# Patient Record
Sex: MALE | Race: BLACK OR AFRICAN AMERICAN | Employment: FULL TIME | URBAN - METROPOLITAN AREA
[De-identification: names, ages, dates, MRNs, and addresses within clinical notes are randomized per-mention and may not be internally consistent; named-entity substitution may affect disease eponyms.]

---

## 2017-01-16 ENCOUNTER — OFFICE VISIT (OUTPATIENT)
Dept: FAMILY MEDICINE CLINIC | Age: 52
End: 2017-01-16

## 2017-01-16 VITALS
TEMPERATURE: 97.9 F | SYSTOLIC BLOOD PRESSURE: 144 MMHG | DIASTOLIC BLOOD PRESSURE: 93 MMHG | HEART RATE: 81 BPM | WEIGHT: 283.6 LBS | HEIGHT: 62 IN | OXYGEN SATURATION: 94 % | BODY MASS INDEX: 52.19 KG/M2 | RESPIRATION RATE: 20 BRPM

## 2017-01-16 DIAGNOSIS — R73.9 HYPERGLYCEMIA: ICD-10-CM

## 2017-01-16 DIAGNOSIS — M70.22 OLECRANON BURSITIS, LEFT ELBOW: ICD-10-CM

## 2017-01-16 DIAGNOSIS — M17.11 PRIMARY OSTEOARTHRITIS OF RIGHT KNEE: Primary | ICD-10-CM

## 2017-01-16 DIAGNOSIS — G43.909 MIGRAINE SYNDROME: ICD-10-CM

## 2017-01-16 LAB
GLUCOSE POC: 85 MG/DL
HBA1C MFR BLD HPLC: 6.5 %

## 2017-01-16 RX ORDER — METHYLPREDNISOLONE ACETATE 40 MG/ML
40 INJECTION, SUSPENSION INTRA-ARTICULAR; INTRALESIONAL; INTRAMUSCULAR; SOFT TISSUE ONCE
Qty: 1 ML | Refills: 0
Start: 2017-01-16 | End: 2017-01-16

## 2017-01-16 RX ORDER — TRAMADOL HYDROCHLORIDE 50 MG/1
50 TABLET ORAL
Qty: 40 TAB | Refills: 1 | Status: SHIPPED | OUTPATIENT
Start: 2017-01-16 | End: 2017-03-17 | Stop reason: SDUPTHER

## 2017-01-16 RX ORDER — DICLOFENAC SODIUM 75 MG/1
75 TABLET, DELAYED RELEASE ORAL 2 TIMES DAILY WITH MEALS
Qty: 30 TAB | Refills: 3 | Status: SHIPPED | OUTPATIENT
Start: 2017-01-16 | End: 2018-01-02 | Stop reason: SDUPTHER

## 2017-01-16 NOTE — PROGRESS NOTES
Chief Complaint   Patient presents with    Knee Pain     right knee pain x 2 weeks     DEPARTMENT [facility]  OFFICE PROCEDURE PROGRESS NOTE        Chart reviewed for the following:   Jodi Morales LPN, have reviewed the History, Physical and updated the Allergic reactions for [place] performed immediately prior to start of procedure:   Jodi Morales LPN, have performed the following reviews on [facility] prior to the start of the procedure:            * Patient was identified by name and date of birth   * Agreement on procedure being performed was verified  * Risks and Benefits explained to the patient  * Procedure site verified and marked as necessary  * Patient was positioned for comfort  * Consent was signed and verified     Time: 5:20pm      Date of procedure: 1/16/2017    Procedure performed by:   Altagracia Buck MD    Provider assisted by: none    Patient assisted by: none    How tolerated by patient: well    Post Procedural Pain Scale: 3    Comments: patient is here for a right knee joint injection, rates pain 7/10 prior to procedure

## 2017-01-16 NOTE — MR AVS SNAPSHOT
Visit Information Date & Time Provider Department Dept. Phone Encounter #  
 1/16/2017  4:30 PM Silas Pang, [address] [facility] [phone] [id number] Your Appointments 2/9/2017  9:00 AM  
Any with Rj Mercedes MD  
[address] [place] ([facility]) Appt Note: 1st adherence visit- bring machine [address] [facility] [address] [address] [facility] [address] [address] Upcoming Health Maintenance Date Due FOBT Q 1 YEAR AGE 50-75 9/20/2014 DTaP/Tdap/Td series (2 - Td) 7/30/2026 Allergies as of 1/16/2017  Review Complete On: 1/16/2017 By: Silas Pang MD  
 No Known Allergies Current Immunizations  Never Reviewed Name Date Tdap 7/30/2016 12:33 AM  
  
 Not reviewed this visit You Were Diagnosed With   
  
 Codes Comments Primary osteoarthritis of right knee    -  Primary ICD-10-CM: M17.11 ICD-9-CM: 715.16 Migraine syndrome     ICD-10-CM: J73.224 ICD-9-CM: 346.00 Olecranon bursitis, left elbow     ICD-10-CM: M70.22 ICD-9-CM: 726.33 Hyperglycemia     ICD-10-CM: R73.9 ICD-9-CM: 790.29 Vitals BP Pulse Temp Resp Height(growth percentile) Weight(growth percentile) (!) 144/93 81 97.9 °F (36.6 °C) (Oral) 20 5' 2\" (1.575 m) 283 lb 9.6 oz (128.6 kg) SpO2 BMI Smoking Status 94% 51.87 kg/m2 Never Smoker Vitals History BMI and BSA Data Body Mass Index Body Surface Area  
 51.87 kg/m 2 2.37 m 2 Preferred Pharmacy Pharmacy Name Phone [id number] - [address] [phone] Your Updated Medication List  
  
   
This list is accurate as of: 1/16/17  5:54 PM.  Always use your most recent med list.  
  
  
  
  
 albuterol 90 mcg/actuation inhaler Commonly known as:  PROVENTIL HFA, VENTOLIN HFA, PROAIR HFA Take 2 Puffs by inhalation every four (4) hours as needed for Wheezing. chlorpheniramine-HYDROcodone 10-8 mg/5 mL suspension Commonly known as:  Earle Lapidus ER Take 5 mL by mouth every twelve (12) hours as needed for Cough. Max Daily Amount: 10 mL. diclofenac EC 75 mg EC tablet Commonly known as:  VOLTAREN Take 1 Tab by mouth two (2) times daily (with meals). dilTIAZem 300 mg SR capsule Commonly known as:  [facility] TAKE ONE CAPSULE BY MOUTH EVERY DAY  
  
 hydroCHLOROthiazide 12.5 mg capsule Commonly known as:  Hans Tommie TAKE 1 CAPSULE BY MOUTH EVERY MORNING  
  
 HYDROcodone-acetaminophen 5-325 mg per tablet Commonly known as:  Jacinta Petri Take 1 Tab by mouth every six (6) hours as needed for Pain. Max Daily Amount: 4 Tabs. losartan 100 mg tablet Commonly known as:  COZAAR  
TAKE 1 TABLET BY MOUTH EVERY DAY  
  
 methylPREDNISolone acetate 40 mg/mL injection Commonly known as:  DEPO-Medrol 1 mL by IntraMUSCular route once for 1 dose. traMADol 50 mg tablet Commonly known as:  ULTRAM  
Take 1 Tab by mouth every six (6) hours as needed for Pain. Max Daily Amount: 200 mg.  
  
 zolpidem 5 mg tablet Commonly known as:  AMBIEN Take 1 Tab by mouth nightly as needed for Sleep. Max Daily Amount: 5 mg. Prescriptions Printed Refills  
 traMADol (ULTRAM) 50 mg tablet 1 Sig: Take 1 Tab by mouth every six (6) hours as needed for Pain. Max Daily Amount: 200 mg. Class: Print Route: Oral  
  
Prescriptions Sent to Pharmacy Refills  
 diclofenac EC (VOLTAREN) 75 mg EC tablet 3 Sig: Take 1 Tab by mouth two (2) times daily (with meals). Class: Normal  
 Pharmacy: [facility] [address] Ph #: [phone] Route: Oral  
  
We Performed the Following AMB POC GLUCOSE, QUANTITATIVE, BLOOD [18540 CPT(R)] AMB POC HEMOGLOBIN A1C [29793 CPT(R)] METABOLIC PANEL, COMPREHENSIVE [70393 CPT(R)] METHYLPREDNISOLONE ACETATE INJECTION 40 MG [ [facility] [place]] Comments:  
 Submit quantity per 40 mg injection LA DRAIN/INJECT LARGE JOINT/BURSA [id number] CPT(R)] Patient Instructions Joint Injections: Care Instructions Your Care Instructions Joint injections are shots into a joint, such as the knee. They may be used to put in medicines, such as pain relievers. Or they can be used to take out fluid. Sometimes the fluid is tested in a lab. This can help find the cause of a joint problem. A corticosteroid, or steroid, shot is used to reduce inflammation in tendons or joints. It is often used to treat problems such as arthritis, tendinitis, and bursitis. Steroids can be injected directly into a painful, inflamed joint. They can also help reduce inflammation of a bursa. A bursa is a sac of fluid. It cushions and lubricates areas where tendons, ligaments, skin, muscles, or bones rub against each other. A steroid shot can sometimes help with short-term pain relief when other treatments haven't worked. If steroid shots help, pain may improve for weeks or months. Follow-up care is a key part of your treatment and safety. Be sure to make and go to all appointments, and call your doctor if you are having problems. It's also a good idea to know your test results and keep a list of the medicines you take. How can you care for yourself at home? · Put ice or a cold pack on the area for 10 to 20 minutes at a time. Put a thin cloth between the ice and your skin. · Take anti-inflammatory medicines to reduce pain, swelling, or inflammation. These include ibuprofen (Advil, Motrin) and naproxen (Aleve). Read and follow all instructions on the label. · Avoid strenuous activities for several days, especially those that put stress on the area where you got the shot. · If you have dressings over the area, keep them clean and dry. You may remove them when your doctor tells you to. When should you call for help? Call your doctor now or seek immediate medical care if: 
· You have signs of infection, such as: 
¨ Increased pain, swelling, warmth, or redness. ¨ Red streaks leading from the site. ¨ Pus draining from the site. ¨ A fever. Watch closely for changes in your health, and be sure to contact your doctor if you have any problems. Where can you learn more? Go to http://elijah-reagan.info/. Enter N616 in the search box to learn more about \"Joint Injections: Care Instructions. \" Current as of: May 23, 2016 Content Version: 11.1 © 9282-3166 eRALOS3. Care instructions adapted under license by [employer] (which disclaims liability or warranty for this information). If you have questions about a medical condition or this instruction, always ask your healthcare professional. [place] [address] any warranty or liability for your use of this information. Introducing [facility] & HEALTH SERVICES! Sheldon Balbuena introduces [employer]Works patient portal. Now you can access parts of your medical record, email your doctor's office, and request medication refills online. 1. In your internet browser, go to [email or web address] 2. Click on the First Time User? Click Here link in the Sign In box. You will see the New Member Sign Up page. 3. Enter your TraceWorks Access Code exactly as it appears below. You will not need to use this code after youve completed the sign-up process. If you do not sign up before the expiration date, you must request a new code. · TraceWorks Access Code: [email or web address] Expires: 4/16/2017  5:54 PM 
 
4. Enter the last four digits of your Social Security Number (xxxx) and Date of Birth (mm/dd/yyyy) as indicated and click Submit. You will be taken to the next sign-up page. 5. Create a Longaccess ID. This will be your Longaccess login ID and cannot be changed, so think of one that is secure and easy to remember. 6. Create a Longaccess password. You can change your password at any time. 7. Enter your Password Reset Question and Answer. This can be used at a later time if you forget your password. 8. Enter your e-mail address. You will receive e-mail notification when new information is available in [address] 9. Click Sign Up. You can now view and download portions of your medical record. 10. Click the Download Summary menu link to download a portable copy of your medical information. If you have questions, please visit the Frequently Asked Questions section of the Longaccess website. Remember, Longaccess is NOT to be used for urgent needs. For medical emergencies, dial 911. Now available from your iPhone and Android! Please provide this summary of care documentation to your next provider. Your primary care clinician is listed as Samuel Meek. If you have any questions after today's visit, please call [phone].

## 2017-01-16 NOTE — PATIENT INSTRUCTIONS
Joint Injections: Care Instructions  Your Care Instructions  Joint injections are shots into a joint, such as the knee. They may be used to put in medicines, such as pain relievers. Or they can be used to take out fluid. Sometimes the fluid is tested in a lab. This can help find the cause of a joint problem. A corticosteroid, or steroid, shot is used to reduce inflammation in tendons or joints. It is often used to treat problems such as arthritis, tendinitis, and bursitis. Steroids can be injected directly into a painful, inflamed joint. They can also help reduce inflammation of a bursa. A bursa is a sac of fluid. It cushions and lubricates areas where tendons, ligaments, skin, muscles, or bones rub against each other. A steroid shot can sometimes help with short-term pain relief when other treatments haven't worked. If steroid shots help, pain may improve for weeks or months. Follow-up care is a key part of your treatment and safety. Be sure to make and go to all appointments, and call your doctor if you are having problems. It's also a good idea to know your test results and keep a list of the medicines you take. How can you care for yourself at home? · Put ice or a cold pack on the area for 10 to 20 minutes at a time. Put a thin cloth between the ice and your skin. · Take anti-inflammatory medicines to reduce pain, swelling, or inflammation. These include ibuprofen (Advil, Motrin) and naproxen (Aleve). Read and follow all instructions on the label. · Avoid strenuous activities for several days, especially those that put stress on the area where you got the shot. · If you have dressings over the area, keep them clean and dry. You may remove them when your doctor tells you to. When should you call for help? Call your doctor now or seek immediate medical care if:  · You have signs of infection, such as:  ¨ Increased pain, swelling, warmth, or redness. ¨ Red streaks leading from the site.   ¨ Pus draining from the site. ¨ A fever. Watch closely for changes in your health, and be sure to contact your doctor if you have any problems. Where can you learn more? Go to http://elijah-reagan.info/. Enter N616 in the search box to learn more about \"Joint Injections: Care Instructions. \"  Current as of: May 23, 2016  Content Version: 11.1  © 5618-4518 AlephD. Care instructions adapted under license by [employer] (which disclaims liability or warranty for this information). If you have questions about a medical condition or this instruction, always ask your healthcare professional. [address] any warranty or liability for your use of this information.

## 2017-01-16 NOTE — PROGRESS NOTES
HISTORY OF PRESENT ILLNESS  Barbara Espinoza III is a 45 y.o. male. rt knee pain for weeks,warner hxof oa,no apparent injury. L olecranon pain and tenderness for severa weeks. Random BS elevations on home testing with mothers meter,no hx DM  Knee Pain    The history is provided by the patient. This is a chronic problem. The problem occurs daily. The problem has been gradually worsening. The pain is present in the right knee. The quality of the pain is described as aching. The pain is at a severity of 4/10. The pain is moderate. Associated symptoms include stiffness. The symptoms are aggravated by activity. Elbow Pain    This is a new problem. The current episode started more than 1 week ago. The problem occurs daily. The problem has been gradually worsening. The pain is present in the left elbow. The quality of the pain is described as aching. The pain is at a severity of 5/10. The pain is moderate. Associated symptoms include stiffness. Blood sugar problem   This is a new problem. The problem occurs every several days. The problem has not changed since onset. Pertinent negatives include no headaches and no shortness of breath. Review of Systems   Constitutional: Negative for fever and malaise/fatigue. Respiratory: Negative for shortness of breath. Genitourinary: Negative for frequency. Musculoskeletal: Positive for joint pain and stiffness. Skin: Negative for rash. Neurological: Negative for headaches. Physical Exam   Constitutional: He is oriented to person, place, and time. He appears well-developed and well-nourished. HENT:   Head: Normocephalic and atraumatic. Right Ear: External ear normal.   Left Ear: External ear normal.   Nose: Nose normal.   Mouth/Throat: Oropharynx is clear and moist.   Cardiovascular: Normal rate and regular rhythm. Pulmonary/Chest: Effort normal and breath sounds normal.   Abdominal: Soft.  Bowel sounds are normal.   Musculoskeletal:        Left elbow: He exhibits normal range of motion, no swelling, no effusion, no deformity and no laceration. Tenderness found. Olecranon process tenderness noted. Right knee: He exhibits decreased range of motion and swelling. Arms:  Neurological: He is alert and oriented to person, place, and time. Skin: Skin is warm and dry. ASSESSMENT and [address] was seen today for knee pain. Diagnoses and all orders for this visit:    Primary osteoarthritis of right knee  -     methylPREDNISolone acetate (DEPO-MEDROL) 40 mg/mL injection; 1 mL by IntraMUSCular route once for 1 dose. -     (DEPO-MEDROL 40 mg  -   quantity 1 for Reimbursement) METHYLPREDNISOLONE ACETATE 40 mg injection  -     15552 - DRAIN/INJECT LARGE JOINT/BURSA    Migraine syndrome  -     traMADol (ULTRAM) 50 mg tablet; Take 1 Tab by mouth every six (6) hours as needed for Pain. Max Daily Amount: 200 mg. Olecranon bursitis, left elbow  -     diclofenac EC (VOLTAREN) 75 mg EC tablet; Take 1 Tab by mouth two (2) times daily (with meals). Hyperglycemia  -     AMB POC HEMOGLOBIN F7X  -     METABOLIC PANEL, COMPREHENSIVE  -     AMB POC GLUCOSE, QUANTITATIVE, BLOOD      Follow-up Disposition: Not on File  Indications:   Right Knee Osteoarthritis    Procedure:After consent was obtained,and procedure risks and benefits reviewed,using sterile technique the Right Knee Joint   was prepped using Betadine. The joint was entered usinga 23 ga needle and 40 mg of Depo-Medrol and 1 ml Marcaine were injected without difficulty ,and the needle was withdrawn. The procedure was well tolerated,with negligile discomfort postprocedure. The patient was asked to continue to rest the joint fora few days before resuming normal activities. They were advised that there may be increased pain for the next 1-2 days,and to watch for fever,redness,or increased swelling or pain. They were advised to call if such symptoms develop. Joint Injection instruction sheet was given to the patient and reviewed. The patient departed in good condition

## 2017-01-17 LAB
ALBUMIN SERPL-MCNC: 4.7 G/DL (ref 3.5–5.5)
ALBUMIN/GLOB SERPL: 1.8 {RATIO} (ref 1.1–2.5)
ALP SERPL-CCNC: 83 IU/L (ref 39–117)
ALT SERPL-CCNC: 19 IU/L (ref 0–44)
AST SERPL-CCNC: 27 IU/L (ref 0–40)
BILIRUB SERPL-MCNC: 0.3 MG/DL (ref 0–1.2)
BUN SERPL-MCNC: 17 MG/DL (ref 6–24)
BUN/CREAT SERPL: 17 (ref 9–20)
CALCIUM SERPL-MCNC: 9.5 MG/DL (ref 8.7–10.2)
CHLORIDE SERPL-SCNC: 104 MMOL/L (ref 96–106)
CO2 SERPL-SCNC: 25 MMOL/L (ref 18–29)
CREAT SERPL-MCNC: 0.99 MG/DL (ref 0.76–1.27)
GLOBULIN SER CALC-MCNC: 2.6 G/DL (ref 1.5–4.5)
GLUCOSE SERPL-MCNC: 81 MG/DL (ref 65–99)
POTASSIUM SERPL-SCNC: 3.9 MMOL/L (ref 3.5–5.2)
PROT SERPL-MCNC: 7.3 G/DL (ref 6–8.5)
SODIUM SERPL-SCNC: 143 MMOL/L (ref 134–144)

## 2017-01-18 ENCOUNTER — TELEPHONE (OUTPATIENT)
Dept: FAMILY MEDICINE CLINIC | Age: 52
End: 2017-01-18

## 2017-01-18 DIAGNOSIS — M70.22 OLECRANON BURSITIS OF LEFT ELBOW: Primary | ICD-10-CM

## 2017-01-18 RX ORDER — PREDNISONE 10 MG/1
10 TABLET ORAL 2 TIMES DAILY
Qty: 10 TAB | Refills: 0 | Status: SHIPPED | OUTPATIENT
Start: 2017-01-18 | End: 2017-03-17 | Stop reason: SDUPTHER

## 2017-01-18 NOTE — TELEPHONE ENCOUNTER
Patient states that his left arm has swollen more and more pain since his visit on Monday.  Please call to advise [phone]

## 2017-01-18 NOTE — TELEPHONE ENCOUNTER
----- Message from Carissa Mac sent at 1/18/2017  7:09 AM EST -----  Regarding: Dr. Anthony Nettles  Pt stated he is not any better since Monday and was advised to call to come back in to see the doctor. Pt would like an appt for today. Best contact number  [phone].

## 2017-01-26 RX ORDER — HYDROCHLOROTHIAZIDE 12.5 MG/1
CAPSULE ORAL
Qty: 90 CAP | Refills: 0 | Status: SHIPPED | OUTPATIENT
Start: 2017-01-26 | End: 2017-01-30 | Stop reason: SDUPTHER

## 2017-01-26 RX ORDER — DILTIAZEM HYDROCHLORIDE 300 MG/1
CAPSULE, COATED, EXTENDED RELEASE ORAL
Qty: 90 CAP | Refills: 0 | Status: SHIPPED | OUTPATIENT
Start: 2017-01-26 | End: 2017-03-20 | Stop reason: SDUPTHER

## 2017-01-26 RX ORDER — LOSARTAN POTASSIUM 100 MG/1
TABLET ORAL
Qty: 90 TAB | Refills: 0 | Status: SHIPPED | OUTPATIENT
Start: 2017-01-26 | End: 2017-01-30 | Stop reason: SDUPTHER

## 2017-03-17 DIAGNOSIS — M70.22 OLECRANON BURSITIS OF LEFT ELBOW: ICD-10-CM

## 2017-03-17 DIAGNOSIS — G43.909 MIGRAINE SYNDROME: ICD-10-CM

## 2017-03-17 RX ORDER — TRAMADOL HYDROCHLORIDE 50 MG/1
50 TABLET ORAL
Qty: 40 TAB | Refills: 1 | Status: SHIPPED | OUTPATIENT
Start: 2017-03-17 | End: 2018-02-09 | Stop reason: SDUPTHER

## 2017-03-17 RX ORDER — PREDNISONE 10 MG/1
10 TABLET ORAL 2 TIMES DAILY
Qty: 10 TAB | Refills: 0 | Status: SHIPPED | OUTPATIENT
Start: 2017-03-17 | End: 2017-06-16 | Stop reason: ALTCHOICE

## 2017-03-20 ENCOUNTER — OFFICE VISIT (OUTPATIENT)
Dept: FAMILY MEDICINE CLINIC | Age: 52
End: 2017-03-20

## 2017-03-20 VITALS
OXYGEN SATURATION: 98 % | WEIGHT: 287.4 LBS | HEART RATE: 89 BPM | HEIGHT: 62 IN | DIASTOLIC BLOOD PRESSURE: 98 MMHG | BODY MASS INDEX: 52.89 KG/M2 | SYSTOLIC BLOOD PRESSURE: 138 MMHG | TEMPERATURE: 98 F | RESPIRATION RATE: 28 BRPM

## 2017-03-20 DIAGNOSIS — I10 ESSENTIAL HYPERTENSION, BENIGN: ICD-10-CM

## 2017-03-20 DIAGNOSIS — M17.11 PRIMARY OSTEOARTHRITIS OF RIGHT KNEE: ICD-10-CM

## 2017-03-20 DIAGNOSIS — G89.29 CHRONIC LEFT SHOULDER PAIN: Primary | ICD-10-CM

## 2017-03-20 DIAGNOSIS — M25.512 CHRONIC LEFT SHOULDER PAIN: Primary | ICD-10-CM

## 2017-03-20 NOTE — MR AVS SNAPSHOT
Visit Information Date & Time Provider Department Dept. Phone Encounter #  
 3/20/2017 10:30 AM Adriano Allen MD [facility] [phone] [id number] Follow-up Instructions Return in about 3 months (around 6/20/2017). Your Appointments 3/20/2017 10:30 AM  
ROUTINE CARE with Adriano Allen MD  
[facility]) Appt Note: shoulder & Knee pain-per [place] [address] [phone] [address] [phone]  
  
    
 4/6/2017  9:20 AM  
Any with Keri Oates MD  
[address] [place] ([facility]) Appt Note: cpap adherence, bring machine [address] [facility] [address] [address] [place] [address]., [address] [address] Upcoming Health Maintenance Date Due FOBT Q 1 YEAR AGE 50-75 9/20/2014 DTaP/Tdap/Td series (2 - Td) 7/30/2026 Allergies as of 3/20/2017  Review Complete On: 3/20/2017 By: Chavo Chew No Known Allergies Current Immunizations  Never Reviewed Name Date Tdap 7/30/2016 12:33 AM  
  
 Not reviewed this visit You Were Diagnosed With   
  
 Codes Comments Chronic left shoulder pain    -  Primary ICD-10-CM: M25.512, I33.18 ICD-9-CM: 719.41, 338.29 Primary osteoarthritis of right knee     ICD-10-CM: M17.11 ICD-9-CM: 715.16 Essential hypertension, benign     ICD-10-CM: I10 
ICD-9-CM: 401.1 Vitals BP Pulse Temp Resp Height(growth percentile) Weight(growth percentile) (!) 138/98 (BP 1 Location: Left arm, BP Patient Position: Sitting) 89 98 °F (36.7 °C) (Oral) 28 5' 2\" (1.575 m) 287 lb 6.4 oz (130.4 kg) SpO2 BMI Smoking Status 98% 52.57 kg/m2 Never Smoker Vitals History BMI and BSA Data Body Mass Index Body Surface Area  52.57 kg/m 2 2.39 m 2  
  
  
 Preferred Pharmacy Pharmacy Name Phone [id number] [address] [phone] Your Updated Medication List  
  
   
This list is accurate as of: 3/20/17 10:20 AM.  Always use your most recent med list.  
  
  
  
  
 albuterol 90 mcg/actuation inhaler Commonly known as:  PROVENTIL HFA, VENTOLIN HFA, PROAIR HFA Take 2 Puffs by inhalation every four (4) hours as needed for Wheezing. chlorpheniramine-HYDROcodone 10-8 mg/5 mL suspension Commonly known as:  Charisma Kroner ER Take 5 mL by mouth every twelve (12) hours as needed for Cough. Max Daily Amount: 10 mL. diclofenac EC 75 mg EC tablet Commonly known as:  VOLTAREN Take 1 Tab by mouth two (2) times daily (with meals). dilTIAZem 300 mg SR capsule Commonly known as:  [facility] TAKE ONE CAPSULE BY MOUTH EVERY DAY  
  
 hydroCHLOROthiazide 12.5 mg capsule Commonly known as:  Quintella Antes TAKE ONE CAPSULE BY MOUTH EVERY MORNING  
  
 HYDROcodone-acetaminophen 5-325 mg per tablet Commonly known as:  Annette Mura Take 1 Tab by mouth every six (6) hours as needed for Pain. Max Daily Amount: 4 Tabs. losartan 100 mg tablet Commonly known as:  COZAAR  
TAKE 1 TABLET BY MOUTH EVERY DAY  
  
 predniSONE 10 mg tablet Commonly known as:  Odette Johnnie Take 1 Tab by mouth two (2) times a day. traMADol 50 mg tablet Commonly known as:  ULTRAM  
Take 1 Tab by mouth every six (6) hours as needed for Pain. Max Daily Amount: 200 mg.  
  
 zolpidem 5 mg tablet Commonly known as:  AMBIEN Take 1 Tab by mouth nightly as needed for Sleep. Max Daily Amount: 5 mg. We Performed the Following REFERRAL TO ORTHOPEDIC SURGERY [REF62 Custom] Comments:  
 l shoulder ,rt knee pain Follow-up Instructions Return in about 3 months (around 6/20/2017). Referral Information Referral ID Referred By Referred To [id number] [address] [place] [address] Phone: [phone] Visits Status Start Date End Date 1 New Request 3/20/17 3/20/18 If your referral has a status of pending review or denied, additional information will be sent to support the outcome of this decision. Introducing [facility] & HEALTH SERVICES! Tj Gannon introduces [employer] patient portal. Now you can access parts of your medical record, email your doctor's office, and request medication refills online. 1. In your internet browser, go to https://"[employer]/"[employer]" 2. Click on the First Time User? Click Here link in the Sign In box. You will see the New Member Sign Up page. 3. Enter your [employer] Access Code exactly as it appears below. You will not need to use this code after youve completed the sign-up process. If you do not sign up before the expiration date, you must request a new code. · [employer] Access Code: [email or web address] Expires: 4/16/2017  6:54 PM 
 
4. Enter the last four digits of your Social Security Number (xxxx) and Date of Birth (mm/dd/yyyy) as indicated and click Submit. You will be taken to the next sign-up page. 5. Create a [employer] ID. This will be your [employer] login ID and cannot be changed, so think of one that is secure and easy to remember. 6. Create a [employer] password. You can change your password at any time. 7. Enter your Password Reset Question and Answer. This can be used at a later time if you forget your password. 8. Enter your e-mail address. You will receive e-mail notification when new information is available in [address] 9. Click Sign Up. You can now view and download portions of your medical record. 10. Click the Download Summary menu link to download a portable copy of your medical information.  
 
If you have questions, please visit the Frequently Asked Questions section of the [employer]. Remember, Loop[employer]hart is NOT to be used for urgent needs. For medical emergencies, dial 911. Now available from your iPhone and Android! Please provide this summary of care documentation to your next provider. Your primary care clinician is listed as Alexa Cochran. If you have any questions after today's visit, please call [phone].

## 2017-03-20 NOTE — PROGRESS NOTES
Chief Complaint   Patient presents with    Knee Pain     Pt having R knee pain.  Shoulder Pain     Pt having L shoulder pain.

## 2017-03-20 NOTE — PROGRESS NOTES
HISTORY OF PRESENT ILLNESS  Mercedes January III is a 45 y.o. male. recurrence fo rt knee pain,swelling. New l anterior shoulder discomfort,no apparent injury  Knee Pain    The history is provided by the patient. This is a recurrent problem. The problem occurs daily. The problem has been gradually worsening. The pain is present in the right knee. The pain is at a severity of 5/10. The pain is moderate. Pertinent negatives include no back pain and no neck pain. Shoulder Pain    The history is provided by the patient. The incident occurred more than 1 week ago. There was no injury mechanism. The left shoulder is affected. The pain is at a severity of 5/10. The pain is moderate. The pain has been fluctuating since onset. Associated symptoms include muscle weakness. Review of Systems   Constitutional: Negative for fever. Musculoskeletal: Positive for joint pain. Negative for back pain and neck pain. Skin: Negative for rash. Physical Exam   Constitutional: He is oriented to person, place, and time. He appears well-developed and well-nourished. HENT:   Head: Normocephalic and atraumatic. Right Ear: External ear normal.   Left Ear: External ear normal.   Nose: Nose normal.   Mouth/Throat: Oropharynx is clear and moist.   Eyes: Conjunctivae are normal. Pupils are equal, round, and reactive to light. Neck: Normal range of motion. Neck supple. Cardiovascular: Normal rate and regular rhythm. Pulmonary/Chest: Effort normal and breath sounds normal.   Abdominal: Soft. Bowel sounds are normal.   Musculoskeletal:        Left shoulder: He exhibits decreased range of motion, tenderness, bony tenderness, crepitus and decreased strength. He exhibits no deformity. Right knee: He exhibits decreased range of motion, swelling and effusion. Tenderness found. Medial joint line tenderness noted. Arms:  Tender l ac joint   Neurological: He is alert and oriented to person, place, and time.    Skin: Skin is warm and dry. ASSESSMENT and PLAN  Rollie Epley was seen today for knee pain and shoulder pain. Diagnoses and all orders for this visit:    Chronic left shoulder pain  -     REFERRAL TO ORTHOPEDIC SURGERY    Primary osteoarthritis of right knee  -     REFERRAL TO ORTHOPEDIC SURGERY    Essential hypertension, benign      Follow-up Disposition:  Return in about 3 months (around 6/20/2017).

## 2017-04-06 ENCOUNTER — OFFICE VISIT (OUTPATIENT)
Dept: SLEEP MEDICINE | Age: 52
End: 2017-04-06

## 2017-04-06 VITALS
HEART RATE: 76 BPM | SYSTOLIC BLOOD PRESSURE: 132 MMHG | WEIGHT: 285 LBS | BODY MASS INDEX: 52.44 KG/M2 | TEMPERATURE: 98.4 F | HEIGHT: 62 IN | DIASTOLIC BLOOD PRESSURE: 87 MMHG | OXYGEN SATURATION: 95 %

## 2017-04-06 DIAGNOSIS — G47.33 OBSTRUCTIVE SLEEP APNEA (ADULT) (PEDIATRIC): Primary | ICD-10-CM

## 2017-04-06 DIAGNOSIS — I10 ESSENTIAL HYPERTENSION, BENIGN: ICD-10-CM

## 2017-04-06 NOTE — PROGRESS NOTES
[address] [facility]. [place], [address]  Tel.  [phone]  Fax. 100 [facility] 60  [place], [address] [facility]  Tel.  [phone]  Fax. [phone] [address]   Tel.  [phone]  Fax. [phone]     S>Jamie Verma III is a 45 y.o. male seen for a positive airway pressure follow-up. He reports no problems using the device. The following problems are identified:    Drowsiness no Problems exhaling no   Snoring no Forget to put on no   Mask Comfortable yes Can't fall asleep no   Dry Mouth no Mask falls off no   Air Leaking no Frequent awakenings no       Download reviewed. He admits that his sleep has significantly improved. Therapy Apnea Index averaged over PAP use: 2 /hr which reflects significantly improved sleep breathing condition. No Known Allergies    He has a current medication list which includes the following prescription(s): losartan, hydrochlorothiazide, diclofenac ec, tramadol, prednisone, hydrocodone-acetaminophen, zolpidem, albuterol, chlorpheniramine-hydrocodone, and diltiazem. Kristian Landin He  has a past medical history of ED (erectile dysfunction) (6/15/2010); Encounter for long-term (current) use of other medications (4/10/2011); Essential hypertension, benign (6/15/2010); Family history of colon cancer (2/17/2012); GERD (gastroesophageal reflux disease) (6/15/2010); Headache; Hypertension; Migraine syndrome (6/15/2010); Mixed hyperlipidemia (4/10/2011); and WESTLEY (obstructive sleep apnea) (6/15/2010). [place] Sleepiness Score: 5   and Modified F.O.S.Q. Score Total / 2: 15.5   which reflect improved sleep quality over therapy time.     O>    Visit Vitals    /87    Pulse 76    Temp 98.4 °F (36.9 °C)    Ht 5' 2\" (1.575 m)    Wt 285 lb (129.3 kg)    SpO2 95%    BMI 52.13 kg/m2           General:   Alert, oriented, not in distress   Neck:   No JVD    Chest/Lungs:  symetrical lung expansion , no accessory muscle use    Extremities:  no obvious rashes , negative edema    Neuro:  No focal deficits ; No obvious tremor    Psych:  Normal affect ,  Normal countenance ;           A>    ICD-10-CM ICD-9-CM    1. Obstructive sleep apnea (adult) (pediatric) G47.33 327.23    2. Essential hypertension, benign I10 401.1      AHI = 90(12-16). On CPAP :  12-14 cmH2O. Compliant:      yes    Therapeutic Response:  Positive    P>      * Follow-up Disposition:  Return in about 1 year (around 4/6/2018). he will continue on his current pressure settings. I have counseled the patient regarding the benefits of weight loss. * He was asked to contact our office for any problems regarding PAP therapy. * Counseling was provided regarding the importance of regular PAP use and on proper sleep hygiene and safe driving. * Re-enforced proper and regular cleaning for the device. 2. Hypertension - he continues on his current regimen. I have reviewed the relationship between hypertension as it relates to sleep-disordered breathing.      Angelo Lopes MD  Diplomate in Sleep Medicine  [facility]

## 2017-04-06 NOTE — PATIENT INSTRUCTIONS
[address] [facility] [place]. [place], [address]  Tel.  [phone]  Fax. 100 [facility] 60  [place], [address]  Tel.  [phone]  Fax. [phone] [address]  Tel.  [phone]  Fax. [phone]     PROPER SLEEP HYGIENE    What to avoid  · Do not have drinks with caffeine, such as coffee or black tea, for 8 hours before bed. · Do not smoke or use other types of tobacco near bedtime. Nicotine is a stimulant and can keep you awake. · Avoid drinking alcohol late in the evening, because it can cause you to wake in the middle of the night. · Do not eat a big meal close to bedtime. If you are hungry, eat a light snack. · Do not drink a lot of water close to bedtime, because the need to urinate may wake you up during the night. · Do not read or watch TV in bed. Use the bed only for sleeping and sexual activity. What to try  · Go to bed at the same time every night, and wake up at the same time every morning. Do not take naps during the day. · Keep your bedroom quiet, dark, and cool. · Get regular exercise, but not within 3 to 4 hours of your bedtime. .  · Sleep on a comfortable pillow and mattress. · If watching the clock makes you anxious, turn it facing away from you so you cannot see the time. · If you worry when you lie down, start a worry book. Well before bedtime, write down your worries, and then set the book and your concerns aside. · Try meditation or other relaxation techniques before you go to bed. · If you cannot fall asleep, get up and go to another room until you feel sleepy. Do something relaxing. Repeat your bedtime routine before you go to bed again. · Make your house quiet and calm about an hour before bedtime. Turn down the lights, turn off the TV, log off the computer, and turn down the volume on music. This can help you relax after a busy day.     Drowsy Driving  The [address] [place] [address] Road Traffic Safety Administration cites drowsiness as a causing factor in more than 736,136 police reported crashes annually, resulting in 76,000 injuries and 1,500 deaths. Other surveys suggest 55% of people polled have driven while drowsy in the past year, 23% had fallen asleep but not crashed, 3% crashed, and 2% had and accident due to drowsy driving. Who is at risk? Young Drivers: One study of drowsy driving accidents states that 55% of the drivers were under 25 years. Of those, 75% were male. Shift Workers and Travelers: People who work overnight or travel across time zones frequently are at higher risk of experiencing Circadian Rhythm Disorders. They are trying to work and function when their body is programed to sleep. Sleep Deprived: Lack of sleep has a serious impact on your ability to pay attention or focus on a task. Consistently getting less than the average of 8 hours your body needs creates partial or cumulative sleep deprivation. Untreated Sleep Disorders: Sleep Apnea, Narcolepsy, R.L.S., and other sleep disorders (untreated) prevent a person from getting enough restful sleep. This leads to excessive daytime sleepiness and increases the risk for drowsy driving accidents by up to 7 times. Medications / Alcohol: Even over the counter medications can cause drowsiness. Medications that impair a drivers attention should have a warning label. Alcohol naturally makes you sleepy and on its own can cause accidents. Combined with excessive drowsiness its effects are amplified. Signs of Drowsy Driving:   * You don't remember driving the last few miles   * You may drift out of your neftaly   * You are unable to focus and your thoughts wander   * You may yawn more often than normal   * You have difficulty keeping your eyes open / nodding off   * Missing traffic signs, speeding, or tailgating  Prevention-   Good sleep hygiene, lifestyle and behavioral choices have the most impact on drowsy driving.  There is no substitute for sleep and the average person requires 8 hours nightly. If you find yourself driving drowsy, stop and sleep. Consider the sleep hygiene tips provided during your visit as well. Medication Refill Policy: Refills for all medications require 1 week advance notice. Please have your pharmacy fax a refill request. We are unable to fax, or call in \"controled substance\" medications and you will need to pick these prescriptions up from our office. [employer] Activation    Thank you for requesting access to [employer]. Please follow the instructions below to securely access and download your online medical record. [employer] allows you to send messages to your doctor, view your test results, renew your prescriptions, schedule appointments, and more. How Do I Sign Up? 1. In your internet browser, go to [email or web address]. 2. Click on the First Time User? Click Here link in the Sign In box. You will see the New Member Sign Up page. 3. Enter your [employer] Access Code exactly as it appears below. You will not need to use this code after youve completed the sign-up process. If you do not sign up before the expiration date, you must request a new code. [employer] Access Code: [email or web address]  Expires: 2017  6:54 PM (This is the date your [employer] access code will )    4. Enter the last four digits of your Social Security Number (xxxx) and Date of Birth (mm/dd/yyyy) as indicated and click Submit. You will be taken to the next sign-up page. 5. Create a [facility]Farm ID. This will be your [facility]F[employer] login ID and cannot be changed, so think of one that is secure and easy to remember. 6. Create a [employer] password. You can change your password at any time. 7. Enter your Password Reset Question and Answer. This can be used at a later time if you forget your password. 8. Enter your e-mail address. You will receive e-mail notification when new information is available in [address] 9. Click Sign Up.  You can now view and download portions of your medical record. 10. Click the Download Summary menu link to download a portable copy of your medical information. Additional Information    If you have questions, please call [phone]. Remember, [employer] is NOT to be used for urgent needs. For medical emergencies, dial 911.

## 2017-05-21 ENCOUNTER — APPOINTMENT (OUTPATIENT)
Dept: GENERAL RADIOLOGY | Age: 52
End: 2017-05-21
Attending: PHYSICIAN ASSISTANT
Payer: COMMERCIAL

## 2017-05-21 ENCOUNTER — HOSPITAL ENCOUNTER (EMERGENCY)
Age: 52
Discharge: HOME | End: 2017-05-21
Attending: EMERGENCY MEDICINE
Payer: COMMERCIAL

## 2017-05-21 VITALS
HEIGHT: 63 IN | RESPIRATION RATE: 18 BRPM | WEIGHT: 293.43 LBS | TEMPERATURE: 98 F | BODY MASS INDEX: 51.99 KG/M2 | DIASTOLIC BLOOD PRESSURE: 95 MMHG | SYSTOLIC BLOOD PRESSURE: 147 MMHG | HEART RATE: 90 BPM

## 2017-05-21 DIAGNOSIS — R05.9 COUGH: Primary | ICD-10-CM

## 2017-05-21 DIAGNOSIS — R07.89 CHEST WALL PAIN: ICD-10-CM

## 2017-05-21 DIAGNOSIS — J06.9 ACUTE UPPER RESPIRATORY INFECTION: ICD-10-CM

## 2017-05-21 PROCEDURE — 74011250637 HC RX REV CODE- 250/637: Performed by: PHYSICIAN ASSISTANT

## 2017-05-21 PROCEDURE — 71101 X-RAY EXAM UNILAT RIBS/CHEST: CPT

## 2017-05-21 PROCEDURE — 99283 EMERGENCY DEPT VISIT LOW MDM: CPT

## 2017-05-21 RX ORDER — OXYCODONE AND ACETAMINOPHEN 5; 325 MG/1; MG/1
1 TABLET ORAL
Qty: 10 TAB | Refills: 0 | Status: SHIPPED | OUTPATIENT
Start: 2017-05-21 | End: 2017-06-16 | Stop reason: ALTCHOICE

## 2017-05-21 RX ORDER — ALBUTEROL SULFATE 90 UG/1
2 AEROSOL, METERED RESPIRATORY (INHALATION)
Qty: 1 INHALER | Refills: 1 | Status: SHIPPED | OUTPATIENT
Start: 2017-05-21 | End: 2017-06-16 | Stop reason: SDUPTHER

## 2017-05-21 RX ORDER — DIAZEPAM 5 MG/1
5 TABLET ORAL
Status: COMPLETED | OUTPATIENT
Start: 2017-05-21 | End: 2017-05-21

## 2017-05-21 RX ORDER — OXYCODONE AND ACETAMINOPHEN 5; 325 MG/1; MG/1
1 TABLET ORAL
Status: COMPLETED | OUTPATIENT
Start: 2017-05-21 | End: 2017-05-21

## 2017-05-21 RX ORDER — METHOCARBAMOL 750 MG/1
750 TABLET, FILM COATED ORAL 3 TIMES DAILY
Qty: 15 TAB | Refills: 0 | Status: SHIPPED | OUTPATIENT
Start: 2017-05-21 | End: 2017-06-16 | Stop reason: ALTCHOICE

## 2017-05-21 RX ADMIN — OXYCODONE HYDROCHLORIDE AND ACETAMINOPHEN 1 TABLET: 5; 325 TABLET ORAL at 15:29

## 2017-05-21 RX ADMIN — DIAZEPAM 5 MG: 5 TABLET ORAL at 15:29

## 2017-05-21 NOTE — LETTER
[address] 
[facility] EMERGENCY DEPT 
[address] [place], [place] [id number] [phone] Work/School Note Date: 5/21/2017 To Whom It May concern: 
 
Corin Farris III was seen and treated today in the emergency room by the following provider(s): 
Attending Provider: Yoseph Adrian MD 
Physician Assistant: SHAHBAZ Caruso. Corin Farris III No work 48 hours. Sincerely, SHAHBAZ Caruso

## 2017-05-21 NOTE — DISCHARGE INSTRUCTIONS
Musculoskeletal Chest Pain: Care Instructions  Your Care Instructions  Chest pain is not always a sign that something is wrong with your heart or that you have another serious problem. The doctor thinks your chest pain is caused by strained muscles or ligaments, inflamed chest cartilage, or another problem in your chest, rather than by your heart. You may need more tests to find the cause of your chest pain. Follow-up care is a key part of your treatment and safety. Be sure to make and go to all appointments, and call your doctor if you are having problems. Its also a good idea to know your test results and keep a list of the medicines you take. How can you care for yourself at home? · Take pain medicines exactly as directed. ¨ If the doctor gave you a prescription medicine for pain, take it as prescribed. ¨ If you are not taking a prescription pain medicine, ask your doctor if you can take an over-the-counter medicine. · Rest and protect the sore area. · Stop, change, or take a break from any activity that may be causing your pain or soreness. · Put ice or a cold pack on the sore area for 10 to 20 minutes at a time. Try to do this every 1 to 2 hours for the next 3 days (when you are awake) or until the swelling goes down. Put a thin cloth between the ice and your skin. · After 2 or 3 days, apply a heating pad set on low or a warm cloth to the area that hurts. Some doctors suggest that you go back and forth between hot and cold. · Do not wrap or tape your ribs for support. This may cause you to take smaller breaths, which could increase your risk of lung problems. · Mentholated creams such as Bengay or Icy Hot may soothe sore muscles. Follow the instructions on the package. · Follow your doctor's instructions for exercising. · Gentle stretching and massage may help you get better faster. Stretch slowly to the point just before pain begins, and hold the stretch for at least 15 to 30 seconds.  Do this 3 or 4 times a day. Stretch just after you have applied heat. · As your pain gets better, slowly return to your normal activities. Any increased pain may be a sign that you need to rest a while longer. When should you call for help? Call 911 anytime you think you may need emergency care. For example, call if:  · You have chest pain or pressure. This may occur with:  ¨ Sweating. ¨ Shortness of breath. ¨ Nausea or vomiting. ¨ Pain that spreads from the chest to the neck, jaw, or one or both shoulders or arms. ¨ Dizziness or lightheadedness. ¨ A fast or uneven pulse. After calling 911, chew 1 adult-strength aspirin. Wait for an ambulance. Do not try to drive yourself. · You have sudden chest pain and shortness of breath, or you cough up blood. Call your doctor now or seek immediate medical care if:  · You have any trouble breathing. · Your chest pain gets worse. · Your chest pain occurs consistently with exercise and is relieved by rest.  Watch closely for changes in your health, and be sure to contact your doctor if:  · Your chest pain does not get better after 1 week. Where can you learn more? Go to http://elijah-reagan.info/. Enter V293 in the search box to learn more about \"Musculoskeletal Chest Pain: Care Instructions. \"  Current as of: May 27, 2016  Content Version: 11.2  © 2187-3975 [employer]. Care instructions adapted under license by [employer] (which disclaims liability or warranty for this information). If you have questions about a medical condition or this instruction, always ask your healthcare professional. [place] [address] any warranty or liability for your use of this information. Cough: Care Instructions  Your Care Instructions  A cough is your body's response to something that bothers your throat or airways. Many things can cause a cough. You might cough because of a cold or the flu, bronchitis, or asthma. Smoking, postnasal drip, allergies, and stomach acid that backs up into your throat also can cause coughs. A cough is a symptom, not a disease. Most coughs stop when the cause, such as a cold, goes away. You can take a few steps at home to cough less and feel better. Follow-up care is a key part of your treatment and safety. Be sure to make and go to all appointments, and call your doctor if you are having problems. It's also a good idea to know your test results and keep a list of the medicines you take. How can you care for yourself at home? · Drink lots of water and other fluids. This helps thin the mucus and soothes a dry or sore throat. Honey or lemon juice in hot water or tea may ease a dry cough. · Take cough medicine as directed by your doctor. · Prop up your head on pillows to help you breathe and ease a dry cough. · Try cough drops to soothe a dry or sore throat. Cough drops don't stop a cough. Medicine-flavored cough drops are no better than candy-flavored drops or hard candy. · Do not smoke. Avoid secondhand smoke. If you need help quitting, talk to your doctor about stop-smoking programs and medicines. These can increase your chances of quitting for good. When should you call for help? Call 911 anytime you think you may need emergency care. For example, call if:  · You have severe trouble breathing. Call your doctor now or seek immediate medical care if:  · You cough up blood. · You have new or worse trouble breathing. · You have a new or higher fever. · You have a new rash. Watch closely for changes in your health, and be sure to contact your doctor if:  · You cough more deeply or more often, especially if you notice more mucus or a change in the color of your mucus. · You have new symptoms, such as a sore throat, an earache, or sinus pain. · You do not get better as expected. Where can you learn more? Go to http://iesha.info/.   Enter D231 in the search box to learn more about \"Cough: Care Instructions. \"  Current as of: May 27, 2016  Content Version: 11.2  © 0686-3091 [employer]. Care instructions adapted under license by [employer] (which disclaims liability or warranty for this information). If you have questions about a medical condition or this instruction, always ask your healthcare professional. [address] any warranty or liability for your use of this information. Upper Respiratory Infection (Cold): Care Instructions  Your Care Instructions    An upper respiratory infection, or URI, is an infection of the nose, sinuses, or throat. URIs are spread by coughs, sneezes, and direct contact. The common cold is the most frequent kind of URI. The flu and sinus infections are other kinds of URIs. Almost all URIs are caused by viruses. Antibiotics won't cure them. But you can treat most infections with home care. This may include drinking lots of fluids and taking over-the-counter pain medicine. You will probably feel better in 4 to 10 days. The doctor has checked you carefully, but problems can develop later. If you notice any problems or new symptoms, get medical treatment right away. Follow-up care is a key part of your treatment and safety. Be sure to make and go to all appointments, and call your doctor if you are having problems. It's also a good idea to know your test results and keep a list of the medicines you take. How can you care for yourself at home? · To prevent dehydration, drink plenty of fluids, enough so that your urine is light yellow or clear like water. Choose water and other caffeine-free clear liquids until you feel better. If you have kidney, heart, or liver disease and have to limit fluids, talk with your doctor before you increase the amount of fluids you drink. · Take an over-the-counter pain medicine, such as acetaminophen (Tylenol), ibuprofen (Advil, Motrin), or naproxen (Aleve). Read and follow all instructions on the label. · Before you use cough and cold medicines, check the label. These medicines may not be safe for young children or for people with certain health problems. · Be careful when taking over-the-counter cold or flu medicines and Tylenol at the same time. Many of these medicines have acetaminophen, which is Tylenol. Read the labels to make sure that you are not taking more than the recommended dose. Too much acetaminophen (Tylenol) can be harmful. · Get plenty of rest.  · Do not smoke or allow others to smoke around you. If you need help quitting, talk to your doctor about stop-smoking programs and medicines. These can increase your chances of quitting for good. When should you call for help? Call 911 anytime you think you may need emergency care. For example, call if:  · You have severe trouble breathing. Call your doctor now or seek immediate medical care if:  · You seem to be getting much sicker. · You have new or worse trouble breathing. · You have a new or higher fever. · You have a new rash. Watch closely for changes in your health, and be sure to contact your doctor if:  · You have a new symptom, such as a sore throat, an earache, or sinus pain. · You cough more deeply or more often, especially if you notice more mucus or a change in the color of your mucus. · You do not get better as expected. Where can you learn more? Go to http://elijah-reagan.info/. Enter N056 in the search box to learn more about \"Upper Respiratory Infection (Cold): Care Instructions. \"  Current as of: June 30, 2016  Content Version: 11.2  © 3939-6495 [employer]. Care instructions adapted under license by [employer] (which disclaims liability or warranty for this information).  If you have questions about a medical condition or this instruction, always ask your healthcare professional. Lele Gore disclaims any warranty or liability for your use of this information.

## 2017-05-21 NOTE — ED PROVIDER NOTES
HPI Comments: Philip Sarkar III is a 45 y.o. male with history significant for HTN and WESTLEY presenting ambulatory to [id number] [place] [address] ED with c/o sudden onset of pain to the left ribs x several days. Pt reports he additionally has a mild cough which exacerbates his pain. Pt informs his pain is increased with deep inspirations as well. Pt states his pain is a moderate intensity with an associated sharp sensation. Pt denies any productive sputum or blood with his cough. Pt additionally specifically denies any nausea, vomiting, fevers, chills, abdominal pain, chest pain, or SOB. PCP: Leonora Dee MD    PMHx: GERD  PSHx: colonoscopy   Social Hx: + EtOH; -Smoker; - Illicit Drugs    There are no other changes, complaints or physical findings at this time. Written by CONNOR Casanova, as dictated by [employer] The history is provided by the patient. Past Medical History:   Diagnosis Date    ED (erectile dysfunction) 6/15/2010    Encounter for long-term (current) use of other medications 4/10/2011    Essential hypertension, benign 6/15/2010    Family history of colon cancer 2/17/2012    GERD (gastroesophageal reflux disease) 6/15/2010    Headache     Hypertension     Migraine syndrome 6/15/2010    Mixed hyperlipidemia 4/10/2011    WESTLEY (obstructive sleep apnea) 6/15/2010     Past Surgical History:   Procedure Laterality Date    COLONOSCOPY N/A 6/7/2016    COLONOSCOPY performed by Janie Villar MD at [facility]CEP/CAUTERY  6/7/2016         HX COLONOSCOPY       Family History:   Problem Relation Age of Onset    Diabetes Mother     Stroke Mother     Cancer Father     Diabetes Father      Social History     Social History    Marital status:      Spouse name: N/A    Number of children: N/A    Years of education: N/A     Occupational History    Not on file.      Social History Main Topics    Smoking status: Never Smoker    Smokeless tobacco: Never Used    Alcohol use 1.0 oz/week     2 Cans of beer per week    Drug use: No    Sexual activity: Yes     Partners: Female     Other Topics Concern    Not on file     Social History Narrative     ALLERGIES: Review of patient's allergies indicates no known allergies. Review of Systems   Constitutional: Negative. Negative for chills, diaphoresis and fever. HENT: Negative. Negative for rhinorrhea and sore throat. Eyes: Negative. Negative for visual disturbance. Respiratory: Positive for cough. Negative for shortness of breath. Cardiovascular: Negative. Negative for chest pain. Gastrointestinal: Negative. Negative for abdominal pain, constipation, diarrhea, nausea and vomiting. Endocrine: Negative. Genitourinary: Negative. Negative for dysuria, frequency, hematuria and urgency. Musculoskeletal: Positive for arthralgias (L rib) and myalgias (L rib). Negative for back pain. Skin: Negative. Negative for wound. Allergic/Immunologic: Negative. Neurological: Negative. Negative for syncope, numbness and headaches. Hematological: Negative. Psychiatric/Behavioral: Negative. All other systems reviewed and are negative. Vitals:    05/21/17 1503   BP: (!) 147/95   Pulse: 90   Resp: 18   Temp: 98 °F (36.7 °C)   Weight: 133.1 kg (293 lb 6.9 oz)   Height: 5' 3\" (1.6 m)          Physical Exam   Constitutional: He is oriented to person, place, and time. He appears well-developed and well-nourished. No distress. HENT:   Head: Normocephalic and atraumatic. Right Ear: External ear normal.   Left Ear: External ear normal.   Nose: Nose normal.   Mouth/Throat: Oropharynx is clear and moist. No oropharyngeal exudate. Eyes: Conjunctivae and EOM are normal. Pupils are equal, round, and reactive to light. Right eye exhibits no discharge. Left eye exhibits no discharge. No scleral icterus. Neck: Normal range of motion. Neck supple. No JVD present.  No tracheal deviation present. Cardiovascular: Normal rate, regular rhythm, normal heart sounds and intact distal pulses. Exam reveals no gallop and no friction rub. No murmur heard. Pulmonary/Chest: Effort normal and breath sounds normal. No respiratory distress. He has no wheezes. He has no rales. He exhibits no tenderness. Abdominal: Soft. Bowel sounds are normal. He exhibits no distension and no mass. There is no tenderness. There is no rebound and no guarding. Musculoskeletal: Normal range of motion. He exhibits no edema. Anterior-lateral left rib tenderness   Lymphadenopathy:     He has no cervical adenopathy. Neurological: He is alert and oriented to person, place, and time. He has normal reflexes. No cranial nerve deficit. He exhibits normal muscle tone. Coordination normal.   Skin: Skin is warm and dry. He is not diaphoretic. Psychiatric: He has a normal mood and affect. His behavior is normal. Judgment and thought content normal.   Nursing note and vitals reviewed. MDM  Number of Diagnoses or Management Options  Diagnosis management comments: DDx: sprain, strain, fracture       Amount and/or Complexity of Data Reviewed  Tests in the radiology section of CPT®: ordered and reviewed  Review and summarize past medical records: yes  Independent visualization of images, tracings, or specimens: yes    Patient Progress  Patient progress: stable    ED Course     Procedures    IMAGING RESULTS:  EXAM: XR RIBS LT W PA CXR MIN 3 V     INDICATION: Left-sided rib pain with coughing.     COMPARISON: 8/17/2016.     TECHNIQUE: Frontal upright chest view and 3 oblique views of the left ribs.     FINDINGS: The cardiomediastinal contours are stable. The lungs and pleural  spaces are clear. There is no pneumothorax. There is no acute rib fracture or  other acute bony abnormality. The upper abdomen is unremarkable.     IMPRESSION  IMPRESSION: No acute rib fracture or other bony abnormality. Clear lungs.      MEDICATIONS GIVEN:  Medications   oxyCODONE-acetaminophen (PERCOCET) 5-325 mg per tablet 1 Tab (1 Tab Oral Given 5/21/17 1529)   diazePAM (VALIUM) tablet 5 mg (5 mg Oral Given 5/21/17 1529)     IMPRESSION:  1. Cough    2. Acute upper respiratory infection    3. Chest wall pain      PLAN:  1. Current Discharge Medication List      START taking these medications    Details   oxyCODONE-acetaminophen (PERCOCET) 5-325 mg per tablet Take 1 Tab by mouth every six (6) hours as needed for Pain. Max Daily Amount: 4 Tabs. Qty: 10 Tab, Refills: 0      methocarbamol (ROBAXIN-750) 750 mg tablet Take 1 Tab by mouth three (3) times daily. Qty: 15 Tab, Refills: 0      !! albuterol (PROVENTIL HFA, VENTOLIN HFA, PROAIR HFA) 90 mcg/actuation inhaler Take 2 Puffs by inhalation every four (4) hours as needed for Wheezing. Qty: 1 Inhaler, Refills: 1       !! - Potential duplicate medications found. Please discuss with provider. CONTINUE these medications which have NOT CHANGED    Details   !! albuterol (PROVENTIL HFA, VENTOLIN HFA, PROAIR HFA) 90 mcg/actuation inhaler Take 2 Puffs by inhalation every four (4) hours as needed for Wheezing. Qty: 1 Inhaler, Refills: 0       !! - Potential duplicate medications found. Please discuss with provider. 2.   Follow-up Information     Follow up With Details Comments Contact Info    Linda Galvin MD In 2 days As needed [address] [place] [address]  [phone]          Return to ED if worse     DISCHARGE NOTE:    4:20 PM  The patient is ready for discharge. The patient signs, symptoms, diagnosis, and discharge instructions have been discussed and the patient has conveyed their understanding. The patient is to follow-up as reccommended or returned to the ER should their symptoms worsen. Plan has been discussed and patient is in agreement. This note is prepared by Chantal Calvo acting as Scribe for [employer].     SRINIVASA Bajwa Reshma: This scribe's documentation has been prepared under my direction and personally reviewed by me in its entirety. I confirm that the note above accurately reflects all work, treatment procedures and medical decision makings performed by me.

## 2017-06-14 PROBLEM — R73.02 IGT (IMPAIRED GLUCOSE TOLERANCE): Status: ACTIVE | Noted: 2017-06-14

## 2017-06-14 PROBLEM — R35.1 NOCTURIA: Status: ACTIVE | Noted: 2017-06-14

## 2017-06-16 ENCOUNTER — OFFICE VISIT (OUTPATIENT)
Dept: FAMILY MEDICINE CLINIC | Age: 52
End: 2017-06-16

## 2017-06-16 VITALS
RESPIRATION RATE: 28 BRPM | HEART RATE: 79 BPM | TEMPERATURE: 97.5 F | OXYGEN SATURATION: 96 % | HEIGHT: 63 IN | BODY MASS INDEX: 52.16 KG/M2 | SYSTOLIC BLOOD PRESSURE: 138 MMHG | DIASTOLIC BLOOD PRESSURE: 86 MMHG | WEIGHT: 294.4 LBS

## 2017-06-16 DIAGNOSIS — R35.1 NOCTURIA: ICD-10-CM

## 2017-06-16 DIAGNOSIS — Z00.00 ANNUAL PHYSICAL EXAM: ICD-10-CM

## 2017-06-16 DIAGNOSIS — E78.2 MIXED HYPERLIPIDEMIA: Primary | ICD-10-CM

## 2017-06-16 DIAGNOSIS — R73.02 IGT (IMPAIRED GLUCOSE TOLERANCE): ICD-10-CM

## 2017-06-16 DIAGNOSIS — G43.909 MIGRAINE SYNDROME: ICD-10-CM

## 2017-06-16 DIAGNOSIS — K21.9 GASTROESOPHAGEAL REFLUX DISEASE WITHOUT ESOPHAGITIS: ICD-10-CM

## 2017-06-16 DIAGNOSIS — I10 ESSENTIAL HYPERTENSION, BENIGN: ICD-10-CM

## 2017-06-16 LAB
BILIRUB UR QL STRIP: NEGATIVE
GLUCOSE UR-MCNC: NEGATIVE MG/DL
HBA1C MFR BLD HPLC: 6.6 %
KETONES P FAST UR STRIP-MCNC: NEGATIVE MG/DL
PH UR STRIP: 5.5 [PH] (ref 4.6–8)
PROT UR QL STRIP: NEGATIVE MG/DL
SP GR UR STRIP: 1.01 (ref 1–1.03)
UA UROBILINOGEN AMB POC: NORMAL (ref 0.2–1)
URINALYSIS CLARITY POC: CLEAR
URINALYSIS COLOR POC: YELLOW
URINE BLOOD POC: NORMAL
URINE LEUKOCYTES POC: NEGATIVE
URINE NITRITES POC: NEGATIVE

## 2017-06-16 RX ORDER — BUTALBITAL AND ACETAMINOPHEN 325; 50 MG/1; MG/1
1 TABLET ORAL
Qty: 30 TAB | Refills: 2 | Status: SHIPPED | OUTPATIENT
Start: 2017-06-16 | End: 2020-11-30

## 2017-06-16 RX ORDER — CHLORTHALIDONE 25 MG/1
25 TABLET ORAL DAILY
Qty: 30 TAB | Refills: 11 | Status: SHIPPED | OUTPATIENT
Start: 2017-06-16 | End: 2018-06-28 | Stop reason: SDUPTHER

## 2017-06-16 NOTE — MR AVS SNAPSHOT
Visit Information Date & Time Provider Department Dept. Phone Encounter #  
 6/16/2017 10:15 AM Gabriela Clancy MD [facility] [phone] [id number] Follow-up Instructions Return in about 3 months (around 9/16/2017). Upcoming Health Maintenance Date Due FOBT Q 1 YEAR AGE 50-75 9/20/2014 INFLUENZA AGE 9 TO ADULT 8/1/2017 DTaP/Tdap/Td series (2 - Td) 7/30/2026 Allergies as of 6/16/2017  Review Complete On: 6/16/2017 By: Gabriela Clancy MD  
 No Known Allergies Current Immunizations  Never Reviewed Name Date Tdap 7/30/2016 12:33 AM  
  
 Not reviewed this visit You Were Diagnosed With   
  
 Codes Comments Mixed hyperlipidemia    -  Primary ICD-10-CM: R33.9 ICD-9-CM: 272.2 Annual physical exam     ICD-10-CM: Z00.00 ICD-9-CM: V70.0 Essential hypertension, benign     ICD-10-CM: I10 
ICD-9-CM: 401.1 Gastroesophageal reflux disease without esophagitis     ICD-10-CM: K21.9 ICD-9-CM: 530.81 Migraine syndrome     ICD-10-CM: B82.836 ICD-9-CM: 346.00 Nocturia     ICD-10-CM: R35.1 ICD-9-CM: 788.43 IGT (impaired glucose tolerance)     ICD-10-CM: R73.02 
ICD-9-CM: 790.22 Vitals BP Pulse Temp Resp Height(growth percentile) Weight(growth percentile) (!) 140/96 (BP 1 Location: Right arm, BP Patient Position: Sitting) 79 97.5 °F (36.4 °C) (Oral) 28 5' 3\" (1.6 m) 294 lb 6.4 oz (133.5 kg) SpO2 BMI Smoking Status 96% 52.15 kg/m2 Never Smoker Vitals History BMI and BSA Data Body Mass Index Body Surface Area  
 52.15 kg/m 2 2.44 m 2 Preferred Pharmacy Pharmacy Name Phone [id number] [place] [facility] [address] [phone] Your Updated Medication List  
  
   
This list is accurate as of: 6/16/17 10:56 AM.  Always use your most recent med list.  
  
  
  
  
 albuterol 90 mcg/actuation inhaler Commonly known as:  PROVENTIL HFA, VENTOLIN HFA, PROAIR HFA Take 2 Puffs by inhalation every four (4) hours as needed for Wheezing. butalbital-acetaminophen  mg tablet Commonly known as:  Gavi Salvage Take 1 Tab by mouth every six (6) hours as needed. chlorthalidone 25 mg tablet Commonly known as:  Paul Pinto Take 1 Tab by mouth daily. diclofenac EC 75 mg EC tablet Commonly known as:  VOLTAREN Take 1 Tab by mouth two (2) times daily (with meals). dilTIAZem 300 mg SR capsule Commonly known as:  [facility] TAKE ONE CAPSULE BY MOUTH EVERY DAY  
  
 losartan 100 mg tablet Commonly known as:  COZAAR  
TAKE 1 TABLET BY MOUTH EVERY DAY  
  
 traMADol 50 mg tablet Commonly known as:  ULTRAM  
Take 1 Tab by mouth every six (6) hours as needed for Pain. Max Daily Amount: 200 mg.  
  
 zolpidem 5 mg tablet Commonly known as:  AMBIEN Take 1 Tab by mouth nightly as needed for Sleep. Max Daily Amount: 5 mg. Prescriptions Printed Refills  
 butalbital-acetaminophen (PHRENILIN)  mg tablet 2 Sig: Take 1 Tab by mouth every six (6) hours as needed. Class: Print Route: Oral  
  
Prescriptions Sent to Pharmacy Refills  
 chlorthalidone (HYGROTEN) 25 mg tablet 11 Sig: Take 1 Tab by mouth daily. Class: Normal  
 Pharmacy: [facility] [address] [place] [address] [address] Ph #: [phone] Route: Oral  
  
We Performed the Following AMB POC HEMOGLOBIN A1C [65134 CPT(R)] AMB POC URINALYSIS DIP STICK AUTO W/O MICRO [61123 CPT(R)] CBC WITH AUTOMATED DIFF [43127 CPT(R)] LIPID PANEL [70963 CPT(R)] METABOLIC PANEL, COMPREHENSIVE [24767 CPT(R)] PROSTATE SPECIFIC AG (PSA) [id number] CPT(R)] Follow-up Instructions Return in about 3 months (around 9/16/2017). Introducing [facility]!    
 Fareed Dwyer introduces [employer] patient portal. Now you can access parts of your medical record, email your doctor's office, and request medication refills online. 1. In your internet browser, go to [email or web address] 2. Click on the First Time User? Click Here link in the Sign In box. You will see the New Member Sign Up page. 3. Enter your [employer]ici Access Code exactly as it appears below. You will not need to use this code after youve completed the sign-up process. If you do not sign up before the expiration date, you must request a new code. · [employer] Access Code: [email or web address] Expires: 8/19/2017  3:22 PM 
 
4. Enter the last four digits of your Social Security Number (xxxx) and Date of Birth (mm/dd/yyyy) as indicated and click Submit. You will be taken to the next sign-up page. 5. Create a Vovici ID. This will be your Vovici login ID and cannot be changed, so think of one that is secure and easy to remember. 6. Create a Vovici password. You can change your password at any time. 7. Enter your Password Reset Question and Answer. This can be used at a later time if you forget your password. 8. Enter your e-mail address. You will receive e-mail notification when new information is available in [address] 9. Click Sign Up. You can now view and download portions of your medical record. 10. Click the Download Summary menu link to download a portable copy of your medical information. If you have questions, please visit the Frequently Asked Questions section of the [employer]ovici website. Remember, [employer]ovici is NOT to be used for urgent needs. For medical emergencies, dial 911. Now available from your iPhone and Android! Please provide this summary of care documentation to your next provider. Your primary care clinician is listed as Julia Downing. If you have any questions after today's visit, please call [phone].

## 2017-06-16 NOTE — PROGRESS NOTES
Subjective:     Catarino Alaniz is a 45 y.o. male presenting for annual exam and complete physical.    Patient Active Problem List   Diagnosis Code    Essential hypertension, benign I10    ED (erectile dysfunction) N52.9    GERD (gastroesophageal reflux disease) K21.9    Migraine syndrome G43.909    WESTLEY (obstructive sleep apnea) G47.33    Family history of prostate cancer Z80.42    Mixed hyperlipidemia E78.2    Encounter for long-term (current) use of other medications Z79.899    Family history of colon cancer Z80.0    Nocturia R35.1    IGT (impaired glucose tolerance) R73.02     Patient Active Problem List    Diagnosis Date Noted    Nocturia 06/14/2017    IGT (impaired glucose tolerance) 06/14/2017    Family history of colon cancer 02/17/2012    Mixed hyperlipidemia 04/10/2011    Encounter for long-term (current) use of other medications 04/10/2011    Family history of prostate cancer 10/04/2010    Essential hypertension, benign 06/15/2010    ED (erectile dysfunction) 06/15/2010    GERD (gastroesophageal reflux disease) 06/15/2010    Migraine syndrome 06/15/2010    WESTLEY (obstructive sleep apnea) 06/15/2010     Current Outpatient Prescriptions   Medication Sig Dispense Refill    traMADol (ULTRAM) 50 mg tablet Take 1 Tab by mouth every six (6) hours as needed for Pain. Max Daily Amount: 200 mg. 40 Tab 1    losartan (COZAAR) 100 mg tablet TAKE 1 TABLET BY MOUTH EVERY DAY 90 Tab 1    hydroCHLOROthiazide (MICROZIDE) 12.5 mg capsule TAKE ONE CAPSULE BY MOUTH EVERY MORNING 90 Cap 1    diclofenac EC (VOLTAREN) 75 mg EC tablet Take 1 Tab by mouth two (2) times daily (with meals). 30 Tab 3    zolpidem (AMBIEN) 5 mg tablet Take 1 Tab by mouth nightly as needed for Sleep.  Max Daily Amount: 5 mg. 30 Tab 2    diltiazem (TIAZAC) 300 mg SR capsule TAKE ONE CAPSULE BY MOUTH EVERY DAY 90 Cap 1    albuterol (PROVENTIL HFA, VENTOLIN HFA, PROAIR HFA) 90 mcg/actuation inhaler Take 2 Puffs by inhalation every four (4) hours as needed for Wheezing.  1 Inhaler 0     No Known Allergies  Past Medical History:   Diagnosis Date    ED (erectile dysfunction) 6/15/2010    Encounter for long-term (current) use of other medications 4/10/2011    Essential hypertension, benign 6/15/2010    Family history of colon cancer 2/17/2012    GERD (gastroesophageal reflux disease) 6/15/2010    Headache     Hypertension     Migraine syndrome 6/15/2010    Mixed hyperlipidemia 4/10/2011    WESTLEY (obstructive sleep apnea) 6/15/2010     Past Surgical History:   Procedure Laterality Date    COLONOSCOPY N/A 6/7/2016    COLONOSCOPY performed by Diaz Rhoades MD at [facility]CEP/CAUTERY  6/7/2016         HX COLONOSCOPY       Family History   Problem Relation Age of Onset    Diabetes Mother     Stroke Mother     Hypertension Mother     Cancer Father     Diabetes Father      Social History   Substance Use Topics    Smoking status: Never Smoker    Smokeless tobacco: Never Used    Alcohol use 1.0 oz/week     2 Cans of beer per week             Review of Systems  Constitutional: negative  Eyes: negative  Ears, nose, mouth, throat, and face: negative  Respiratory: negative  Cardiovascular: negative  Gastrointestinal: negative  Genitourinary:negative  Integument/breast: negative  Hematologic/lymphatic: negative    Objective:     Visit Vitals    BP (!) 140/96 (BP 1 Location: Right arm, BP Patient Position: Sitting)    Pulse 79    Temp 97.5 °F (36.4 °C) (Oral)    Resp 28    Ht 5' 3\" (1.6 m)    Wt 294 lb 6.4 oz (133.5 kg)    SpO2 96%    BMI 52.15 kg/m2     Physical exam:   General appearance - alert, well appearing, and in no distress  Mental status - alert, oriented to person, place, and time  Eyes - pupils equal and reactive, extraocular eye movements intact  Ears - bilateral TM's and external ear canals normal  Nose - normal and patent, no erythema, discharge or polyps  Mouth - mucous membranes moist, pharynx normal without lesions  Neck - supple, no significant adenopathy, carotids upstroke normal bilaterally, no bruits, thyroid exam: thyroid is normal in size without nodules or tenderness  Lymphatics - no palpable lymphadenopathy, no hepatosplenomegaly  Chest - clear to auscultation, no wheezes, rales or rhonchi, symmetric air entry  Heart - normal rate, regular rhythm, normal S1, S2, no murmurs, rubs, clicks or gallops  Abdomen - soft, nontender, nondistended, no masses or organomegaly  Rectal - negative without mass, lesions or tenderness, stool guaiac negative, PROSTATE EXAM: smooth and symmetric without nodules or tenderness  Neurological - alert, oriented, normal speech, no focal findings or movement disorder noted  Musculoskeletal - no joint tenderness, deformity or swelling  Extremities - peripheral pulses normal, no pedal edema, no clubbing or cyanosis  Skin - normal coloration and turgor, no rashes, no suspicious skin lesions noted     Assessment/Plan:       lose weight, continue present plan, routine labs ordered, call if any problems. Cut off was seen today for well male. Diagnoses and all orders for this visit:    Mixed hyperlipidemia  -     LIPID PANEL    Annual physical exam    Essential hypertension, benign  -     METABOLIC PANEL, COMPREHENSIVE  -     CBC WITH AUTOMATED DIFF  -     AMB POC URINALYSIS DIP STICK AUTO W/O MICRO  -     chlorthalidone (HYGROTEN) 25 mg tablet; Take 1 Tab by mouth daily. Gastroesophageal reflux disease without esophagitis    Migraine syndrome  -     butalbital-acetaminophen (PHRENILIN)  mg tablet; Take 1 Tab by mouth every six (6) hours as needed. Nocturia  -     PROSTATE SPECIFIC AG    IGT (impaired glucose tolerance)  -     AMB POC HEMOGLOBIN A1C    Doing well,continue current meds and treatments    Follow-up Disposition:  Return in about 3 months (around 9/16/2017). Ellen Smith

## 2017-06-17 LAB
ALBUMIN SERPL-MCNC: 4.4 G/DL (ref 3.5–5.5)
ALBUMIN/GLOB SERPL: 1.6 {RATIO} (ref 1.2–2.2)
ALP SERPL-CCNC: 87 IU/L (ref 39–117)
ALT SERPL-CCNC: 19 IU/L (ref 0–44)
AST SERPL-CCNC: 26 IU/L (ref 0–40)
BASOPHILS # BLD AUTO: 0 X10E3/UL (ref 0–0.2)
BASOPHILS NFR BLD AUTO: 0 %
BILIRUB SERPL-MCNC: 0.3 MG/DL (ref 0–1.2)
BUN SERPL-MCNC: 10 MG/DL (ref 6–24)
BUN/CREAT SERPL: 10 (ref 9–20)
CALCIUM SERPL-MCNC: 9.6 MG/DL (ref 8.7–10.2)
CHLORIDE SERPL-SCNC: 101 MMOL/L (ref 96–106)
CHOLEST SERPL-MCNC: 189 MG/DL (ref 100–199)
CO2 SERPL-SCNC: 27 MMOL/L (ref 18–29)
CREAT SERPL-MCNC: 0.97 MG/DL (ref 0.76–1.27)
EOSINOPHIL # BLD AUTO: 0.1 X10E3/UL (ref 0–0.4)
EOSINOPHIL NFR BLD AUTO: 1 %
ERYTHROCYTE [DISTWIDTH] IN BLOOD BY AUTOMATED COUNT: 14.9 % (ref 12.3–15.4)
GLOBULIN SER CALC-MCNC: 2.8 G/DL (ref 1.5–4.5)
GLUCOSE SERPL-MCNC: 123 MG/DL (ref 65–99)
HCT VFR BLD AUTO: 40 % (ref 37.5–51)
HDLC SERPL-MCNC: 42 MG/DL
HGB BLD-MCNC: 13.3 G/DL (ref 12.6–17.7)
IMM GRANULOCYTES # BLD: 0 X10E3/UL (ref 0–0.1)
IMM GRANULOCYTES NFR BLD: 0 %
INTERPRETATION, 910389: NORMAL
LDLC SERPL CALC-MCNC: 102 MG/DL (ref 0–99)
LYMPHOCYTES # BLD AUTO: 2.1 X10E3/UL (ref 0.7–3.1)
LYMPHOCYTES NFR BLD AUTO: 28 %
MCH RBC QN AUTO: 27.2 PG (ref 26.6–33)
MCHC RBC AUTO-ENTMCNC: 33.3 G/DL (ref 31.5–35.7)
MCV RBC AUTO: 82 FL (ref 79–97)
MONOCYTES # BLD AUTO: 0.8 X10E3/UL (ref 0.1–0.9)
MONOCYTES NFR BLD AUTO: 10 %
NEUTROPHILS # BLD AUTO: 4.5 X10E3/UL (ref 1.4–7)
NEUTROPHILS NFR BLD AUTO: 61 %
PLATELET # BLD AUTO: 314 X10E3/UL (ref 150–379)
POTASSIUM SERPL-SCNC: 3.8 MMOL/L (ref 3.5–5.2)
PROT SERPL-MCNC: 7.2 G/DL (ref 6–8.5)
PSA SERPL-MCNC: 1 NG/ML (ref 0–4)
RBC # BLD AUTO: 4.89 X10E6/UL (ref 4.14–5.8)
SODIUM SERPL-SCNC: 144 MMOL/L (ref 134–144)
TRIGL SERPL-MCNC: 226 MG/DL (ref 0–149)
VLDLC SERPL CALC-MCNC: 45 MG/DL (ref 5–40)
WBC # BLD AUTO: 7.5 X10E3/UL (ref 3.4–10.8)

## 2017-06-19 ENCOUNTER — TELEPHONE (OUTPATIENT)
Dept: FAMILY MEDICINE CLINIC | Age: 52
End: 2017-06-19

## 2017-07-21 RX ORDER — NAPROXEN 500 MG/1
500 TABLET ORAL 2 TIMES DAILY WITH MEALS
Qty: 40 TAB | Refills: 1 | Status: SHIPPED | OUTPATIENT
Start: 2017-07-21 | End: 2018-01-02 | Stop reason: ALTCHOICE

## 2017-07-21 NOTE — TELEPHONE ENCOUNTER
----- Message from Mack Keene sent at 7/21/2017  7:27 AM EDT -----  Regarding: Dr. Clare Jolly refill  Contact: [phone]  Pt is requesting a refill on med naproxen. Pt [facility] completely out of med [facility].  Pt's best contact number is [phone]

## 2017-08-02 ENCOUNTER — HOSPITAL ENCOUNTER (EMERGENCY)
Age: 52
Discharge: ANOTHER HOSPITAL | End: 2017-08-02
Attending: EMERGENCY MEDICINE

## 2017-08-02 ENCOUNTER — HOSPITAL ENCOUNTER (EMERGENCY)
Age: 52
Discharge: HOME | End: 2017-08-03
Attending: EMERGENCY MEDICINE
Payer: COMMERCIAL

## 2017-08-02 ENCOUNTER — APPOINTMENT (OUTPATIENT)
Dept: CT IMAGING | Age: 52
End: 2017-08-02
Attending: EMERGENCY MEDICINE
Payer: COMMERCIAL

## 2017-08-02 ENCOUNTER — APPOINTMENT (OUTPATIENT)
Dept: ULTRASOUND IMAGING | Age: 52
End: 2017-08-02
Attending: EMERGENCY MEDICINE
Payer: COMMERCIAL

## 2017-08-02 ENCOUNTER — HOSPITAL ENCOUNTER (OUTPATIENT)
Dept: LAB | Age: 52
Discharge: HOME | End: 2017-08-02

## 2017-08-02 VITALS
RESPIRATION RATE: 18 BRPM | SYSTOLIC BLOOD PRESSURE: 141 MMHG | OXYGEN SATURATION: 97 % | DIASTOLIC BLOOD PRESSURE: 86 MMHG | HEIGHT: 63 IN | HEART RATE: 68 BPM | TEMPERATURE: 98 F | BODY MASS INDEX: 52.45 KG/M2 | WEIGHT: 296 LBS

## 2017-08-02 DIAGNOSIS — R31.9 HEMATURIA: Primary | ICD-10-CM

## 2017-08-02 DIAGNOSIS — N32.9 LESION OF BLADDER: ICD-10-CM

## 2017-08-02 LAB
ALBUMIN SERPL BCP-MCNC: 3.9 G/DL (ref 3.5–5)
ALBUMIN/GLOB SERPL: 1 {RATIO} (ref 1.1–2.2)
ALP SERPL-CCNC: 89 U/L (ref 45–117)
ALT SERPL-CCNC: 28 U/L (ref 12–78)
ANION GAP BLD CALC-SCNC: 5 MMOL/L (ref 5–15)
APPEARANCE UR: ABNORMAL
AST SERPL W P-5'-P-CCNC: 23 U/L (ref 15–37)
BACTERIA URNS QL MICRO: NEGATIVE /HPF
BILIRUB SERPL-MCNC: 0.3 MG/DL (ref 0.2–1)
BILIRUB UR QL: NEGATIVE
BUN SERPL-MCNC: 12 MG/DL (ref 6–20)
BUN/CREAT SERPL: 12 (ref 12–20)
CALCIUM SERPL-MCNC: 9.2 MG/DL (ref 8.5–10.1)
CHLORIDE SERPL-SCNC: 102 MMOL/L (ref 97–108)
CO2 SERPL-SCNC: 33 MMOL/L (ref 21–32)
COLOR UR: ABNORMAL
CREAT SERPL-MCNC: 1.02 MG/DL (ref 0.7–1.3)
EPITH CASTS URNS QL MICRO: ABNORMAL /LPF
ERYTHROCYTE [DISTWIDTH] IN BLOOD BY AUTOMATED COUNT: 14.2 % (ref 11.5–14.5)
GLOBULIN SER CALC-MCNC: 4 G/DL (ref 2–4)
GLUCOSE SERPL-MCNC: 109 MG/DL (ref 65–100)
GLUCOSE UR STRIP.AUTO-MCNC: NEGATIVE MG/DL
HCT VFR BLD AUTO: 39.1 % (ref 36.6–50.3)
HGB BLD-MCNC: 13.7 G/DL (ref 12.1–17)
HGB UR QL STRIP: ABNORMAL
HYALINE CASTS URNS QL MICRO: ABNORMAL /LPF (ref 0–5)
KETONES UR QL STRIP.AUTO: NEGATIVE MG/DL
LEUKOCYTE ESTERASE UR QL STRIP.AUTO: ABNORMAL
MCH RBC QN AUTO: 29 PG (ref 26–34)
MCHC RBC AUTO-ENTMCNC: 35 G/DL (ref 30–36.5)
MCV RBC AUTO: 82.8 FL (ref 80–99)
NITRITE UR QL STRIP.AUTO: NEGATIVE
PH UR STRIP: 6.5 [PH] (ref 5–8)
PLATELET # BLD AUTO: 323 K/UL (ref 150–400)
POTASSIUM SERPL-SCNC: 3.1 MMOL/L (ref 3.5–5.1)
PROT SERPL-MCNC: 7.9 G/DL (ref 6.4–8.2)
PROT UR STRIP-MCNC: 30 MG/DL
RBC # BLD AUTO: 4.72 M/UL (ref 4.1–5.7)
RBC #/AREA URNS HPF: >100 /HPF (ref 0–5)
SODIUM SERPL-SCNC: 140 MMOL/L (ref 136–145)
SP GR UR REFRACTOMETRY: 1.01 (ref 1–1.03)
UROBILINOGEN UR QL STRIP.AUTO: 0.2 EU/DL (ref 0.2–1)
WBC # BLD AUTO: 9.1 K/UL (ref 4.1–11.1)
WBC URNS QL MICRO: ABNORMAL /HPF (ref 0–4)

## 2017-08-02 PROCEDURE — 81001 URINALYSIS AUTO W/SCOPE: CPT | Performed by: EMERGENCY MEDICINE

## 2017-08-02 PROCEDURE — 87086 URINE CULTURE/COLONY COUNT: CPT | Performed by: EMERGENCY MEDICINE

## 2017-08-02 PROCEDURE — 74177 CT ABD & PELVIS W/CONTRAST: CPT

## 2017-08-02 PROCEDURE — 76770 US EXAM ABDO BACK WALL COMP: CPT

## 2017-08-02 PROCEDURE — 80053 COMPREHEN METABOLIC PANEL: CPT | Performed by: EMERGENCY MEDICINE

## 2017-08-02 PROCEDURE — 51798 US URINE CAPACITY MEASURE: CPT

## 2017-08-02 PROCEDURE — 99284 EMERGENCY DEPT VISIT MOD MDM: CPT

## 2017-08-02 PROCEDURE — 85027 COMPLETE CBC AUTOMATED: CPT | Performed by: EMERGENCY MEDICINE

## 2017-08-02 PROCEDURE — 36415 COLL VENOUS BLD VENIPUNCTURE: CPT | Performed by: EMERGENCY MEDICINE

## 2017-08-02 NOTE — DISCHARGE INSTRUCTIONS

## 2017-08-02 NOTE — UC PROVIDER NOTE
HPI Comments: Gross hematuria for 1 hour    Patient is a 45 y.o. male presenting with hematuria. The history is provided by the patient. Blood in Urine    This is a recurrent problem. The current episode started less than 1 hour ago. The problem occurs every urination. The problem has not changed since onset. The quality of the pain is described as burning. The pain is mild. There has been no fever. There is no history of pyelonephritis. Associated symptoms include hematuria. Pertinent negatives include no chills, no sweats, no nausea, no vomiting, no frequency, no hesitancy, no urgency, no flank pain, no penile discharge, no abdominal pain and no back pain. He has tried nothing for the symptoms. The treatment provided no relief. His past medical history does not include kidney stones, single kidney, urological procedure, recurrent UTIs, urinary stasis, catheterization or urinary catheter problem. Past Medical History:   Diagnosis Date    ED (erectile dysfunction) 6/15/2010    Encounter for long-term (current) use of other medications 4/10/2011    Essential hypertension, benign 6/15/2010    Family history of colon cancer 2/17/2012    GERD (gastroesophageal reflux disease) 6/15/2010    Headache     Hypertension     Migraine syndrome 6/15/2010    Mixed hyperlipidemia 4/10/2011    WESTLEY (obstructive sleep apnea) 6/15/2010        Past Surgical History:   Procedure Laterality Date    COLONOSCOPY N/A 6/7/2016    COLONOSCOPY performed by Cinda Oneill MD at [facility]/CAUTERY  6/7/2016         HX COLONOSCOPY           Family History   Problem Relation Age of Onset    Diabetes Mother     Stroke Mother     Hypertension Mother     Cancer Father     Diabetes Father         Social History     Social History    Marital status:      Spouse name: N/A    Number of children: N/A    Years of education: N/A     Occupational History    Not on file.      Social History Main Topics    Smoking status: Never Smoker    Smokeless tobacco: Never Used    Alcohol use 1.0 oz/week     2 Cans of beer per week    Drug use: No    Sexual activity: Yes     Partners: Female     Other Topics Concern    Not on file     Social History Narrative                ALLERGIES: Review of patient's allergies indicates no known allergies. Review of Systems   Constitutional: Negative for chills. HENT: Negative. Gastrointestinal: Negative for abdominal pain, nausea and vomiting. Genitourinary: Positive for hematuria. Negative for discharge, flank pain, frequency, hesitancy, penile discharge, penile pain, penile swelling and urgency. Musculoskeletal: Negative for back pain. All other systems reviewed and are negative. Vitals:    08/02/17 1916   BP: 141/86   Pulse: 68   Resp: 18   Temp: 98 °F (36.7 °C)   SpO2: 97%   Weight: 134.3 kg (296 lb)   Height: 5' 3\" (1.6 m)       Physical Exam   Constitutional: He is oriented to person, place, and time. He appears well-developed and well-nourished. HENT:   Head: Normocephalic and atraumatic. Mouth/Throat: Oropharynx is clear and moist. No oropharyngeal exudate. Eyes: Conjunctivae and EOM are normal. Pupils are equal, round, and reactive to light. Right eye exhibits no discharge. Left eye exhibits no discharge. No scleral icterus. Neck: Normal range of motion. Neck supple. No tracheal deviation present. No thyromegaly present. Cardiovascular: Normal rate, regular rhythm and normal heart sounds. No murmur heard. Pulmonary/Chest: Effort normal and breath sounds normal. No respiratory distress. He has no wheezes. He has no rales. Abdominal: Soft. Bowel sounds are normal. He exhibits no distension. There is no tenderness. There is no rebound and no guarding. No cva tenderness   Musculoskeletal: Normal range of motion. He exhibits no edema or tenderness. Lymphadenopathy:     He has no cervical adenopathy.    Neurological: He is alert and oriented to person, place, and time. No cranial nerve deficit. Coordination normal.   Skin: Skin is warm. No rash noted. No erythema. Psychiatric: He has a normal mood and affect. His behavior is normal. Judgment and thought content normal.   Nursing note and vitals reviewed.       MDM     Differential Diagnosis; Clinical Impression; Plan:     Gross hematuria, unable to perform dip due to degree of blood, will send to emergency dept for further evaluation      Procedures

## 2017-08-03 VITALS
BODY MASS INDEX: 52.15 KG/M2 | DIASTOLIC BLOOD PRESSURE: 72 MMHG | TEMPERATURE: 98 F | RESPIRATION RATE: 16 BRPM | HEART RATE: 70 BPM | OXYGEN SATURATION: 97 % | WEIGHT: 294.31 LBS | HEIGHT: 63 IN | SYSTOLIC BLOOD PRESSURE: 131 MMHG

## 2017-08-03 PROCEDURE — 74011636320 HC RX REV CODE- 636/320: Performed by: EMERGENCY MEDICINE

## 2017-08-03 PROCEDURE — 74011250636 HC RX REV CODE- 250/636: Performed by: EMERGENCY MEDICINE

## 2017-08-03 PROCEDURE — 74011250637 HC RX REV CODE- 250/637: Performed by: EMERGENCY MEDICINE

## 2017-08-03 RX ORDER — SODIUM CHLORIDE 9 MG/ML
50 INJECTION, SOLUTION INTRAVENOUS
Status: COMPLETED | OUTPATIENT
Start: 2017-08-03 | End: 2017-08-03

## 2017-08-03 RX ORDER — POTASSIUM CHLORIDE 1.5 G/1.77G
20 POWDER, FOR SOLUTION ORAL
Status: COMPLETED | OUTPATIENT
Start: 2017-08-03 | End: 2017-08-03

## 2017-08-03 RX ORDER — SODIUM CHLORIDE 0.9 % (FLUSH) 0.9 %
10 SYRINGE (ML) INJECTION
Status: COMPLETED | OUTPATIENT
Start: 2017-08-03 | End: 2017-08-03

## 2017-08-03 RX ADMIN — POTASSIUM CHLORIDE 20 MEQ: 1.5 POWDER, FOR SOLUTION ORAL at 01:44

## 2017-08-03 RX ADMIN — SODIUM CHLORIDE 50 ML/HR: 900 INJECTION, SOLUTION INTRAVENOUS at 00:41

## 2017-08-03 RX ADMIN — Medication 10 ML: at 00:41

## 2017-08-03 RX ADMIN — IOPAMIDOL 100 ML: 755 INJECTION, SOLUTION INTRAVENOUS at 00:40

## 2017-08-03 NOTE — ED NOTES
Patient received discharge instructions and questions were answered by ED MD Tamara Thornton. Respirations even and unlabored, no signs or symptoms of cardiac distress noted at this time. Any wants or needs verbalized have been addressed to the best of our ability. Patient ambulated from ED with discharge instructions in hand. Wheelchair offered and declined by pt, educated patient on policy of discharge by wheelchair, verbalized understanding.

## 2017-08-03 NOTE — ED PROVIDER NOTES
HPI Comments: Vania William III is a 45 y.o. male with PMhx significant for HTN who presents ambulatory to [id number] [facility] ED with cc of sudden onset constant hematuria since approximately 1800 today. He describes that he passed a blood clot at this time. He reports experiencing similar sx in the past but notes that he only had a few drops of blood at that time. He states that during that episode he was prescribed with abx, noting his sx resolved with this medication. On onset of his sx he states he was evaluated at Urgent Care but due to the amount of blood he was referred to the ED for further work- up. He denies known trauma. He denies being on a blood thinner. He denies any recent travel. Pt specifically denies any sx of generalized pain, fever, chills, flank pain, abd pain, nausea, vomiting, dysuria, and testicular pain. SHx: (-) tobacco use; (+) EtOH use; (-) illicit drug use    PCP: Mona Louise MD    There are no other complaints, changes or physical findings at this time. Written by CONNOR Osorio, as dictated by Cristóbal Patel. Marlo Victoria MD.          The history is provided by the patient. No  was used.         Past Medical History:   Diagnosis Date    ED (erectile dysfunction) 6/15/2010    Encounter for long-term (current) use of other medications 4/10/2011    Essential hypertension, benign 6/15/2010    Family history of colon cancer 2/17/2012    GERD (gastroesophageal reflux disease) 6/15/2010    Headache     Hypertension     Migraine syndrome 6/15/2010    Mixed hyperlipidemia 4/10/2011    WESTLEY (obstructive sleep apnea) 6/15/2010       Past Surgical History:   Procedure Laterality Date    COLONOSCOPY N/A 6/7/2016    COLONOSCOPY performed by Radha Neff MD at . [facility]CEP[facility]CAUTERY  6/7/2016         HX COLONOSCOPY           Family History:   Problem Relation Age of Onset    Diabetes Mother     Stroke Mother     Hypertension Mother  Cancer Father     Diabetes Father        Social History     Social History    Marital status:      Spouse name: N/A    Number of children: N/A    Years of education: N/A     Occupational History    Not on file. Social History Main Topics    Smoking status: Never Smoker    Smokeless tobacco: Never Used    Alcohol use 1.0 oz/week     2 Cans of beer per week    Drug use: No    Sexual activity: Yes     Partners: Female     Other Topics Concern    Not on file     Social History Narrative         ALLERGIES: Review of patient's allergies indicates no known allergies. Review of Systems   Constitutional: Negative for chills and fever. HENT: Negative for congestion. Eyes: Negative for visual disturbance. Respiratory: Negative for chest tightness. Cardiovascular: Negative for chest pain and leg swelling. Gastrointestinal: Negative for abdominal pain and vomiting. Endocrine: Negative for polyuria. Genitourinary: Positive for hematuria. Negative for dysuria and frequency. Musculoskeletal: Negative for myalgias. Skin: Negative for color change. Allergic/Immunologic: Negative for immunocompromised state. Neurological: Negative for numbness. All other systems reviewed and are negative. Patient Vitals for the past 12 hrs:   Temp Pulse Resp BP SpO2   08/03/17 0200 - - - 131/72 97 %   08/03/17 0100 - - - 124/69 96 %   08/03/17 0052 - - - - 98 %   08/03/17 0050 - - - 131/61 -   08/03/17 0001 - - - 130/81 98 %   08/02/17 2308 - - - - 96 %   08/02/17 2306 - - - - 95 %   08/02/17 2304 - - - 125/72 -   08/02/17 2120 - 70 16 - -   08/02/17 2010 98 °F (36.7 °C) 74 18 (!) 168/94 99 %     Physical Exam   Nursing note and vitals reviewed.   General appearance: non-toxic, NAD  Eyes: PERRL, EOMI, conjunctiva normal, anicteric sclera  HEENT: mucous membranes moist, oropharynx is clear  Pulmonary: clear to auscultation bilaterally  Cardiac: normal rate and regular rhythm, no murmurs, gallops, or rubs, symmetric 2+DP pulses, 2+ radial pulses  Abdomen: soft, mild suprapubic tenderness, nondistended, bowel sounds present  MSK: trace pre-tibial edema  Neuro: Alert, answers questions appropriately  Skin: capillary refill brisk   exam is benign     MDM  Number of Diagnoses or Management Options  Hematuria:   Lesion of bladder:   Diagnosis management comments: DDx: UTI, hemorrhagic cystitis, renal cyst, bladder CA    RN checked pre-void volume, ~230ml, and pt able to void ~200ml, essentially no suspicion for significant retention at this point. Amount and/or Complexity of Data Reviewed  Clinical lab tests: ordered and reviewed  Tests in the radiology section of CPT®: ordered and reviewed  Review and summarize past medical records: yes    Patient Progress  Patient progress: stable    ED Course       Procedures  SIGN OUT:  12:25 AM  Patient's presentation, labs/imaging and plan of care was reviewed with Dr. Zunilda Keller as part of sign out. They will await CT and US as part of the plan discussed with the patient. Dr. Navneet Higuera assistance in completion of this plan is greatly appreciated but it should be noted that I will be the provider of record for this patient. Attestation: This is note is prepared by Sara Galvez, acting as Scribe for [employer]. Eloisa Pereira MD.    [employer]. Eloisa Pereira MD The scribe's documentation has been prepared under my direction and personally reviewed by me in its entirety. I confirm that the note above accurately reflects all work, treatment, procedures, and medical decision making performed by me.      LABORATORY TESTS:  Recent Results (from the past 12 hour(s))   CBC W/O DIFF    Collection Time: 08/02/17  9:03 PM   Result Value Ref Range    WBC 9.1 4.1 - 11.1 K/uL    RBC 4.72 4.10 - 5.70 M/uL    HGB 13.7 12.1 - 17.0 g/dL    HCT 39.1 36.6 - 50.3 %    MCV 82.8 80.0 - 99.0 FL    MCH 29.0 26.0 - 34.0 PG    MCHC 35.0 30.0 - 36.5 g/dL    RDW 14.2 11.5 - 14.5 %    PLATELET 630 366 - 483 K/uL   METABOLIC PANEL, COMPREHENSIVE    Collection Time: 08/02/17  9:03 PM   Result Value Ref Range    Sodium 140 136 - 145 mmol/L    Potassium 3.1 (L) 3.5 - 5.1 mmol/L    Chloride 102 97 - 108 mmol/L    CO2 33 (H) 21 - 32 mmol/L    Anion gap 5 5 - 15 mmol/L    Glucose 109 (H) 65 - 100 mg/dL    BUN 12 6 - 20 MG/DL    Creatinine 1.02 0.70 - 1.30 MG/DL    BUN/Creatinine ratio 12 12 - 20      GFR est AA >60 >60 ml/min/1.73m2    GFR est non-AA >60 >60 ml/min/1.73m2    Calcium 9.2 8.5 - 10.1 MG/DL    Bilirubin, total 0.3 0.2 - 1.0 MG/DL    ALT (SGPT) 28 12 - 78 U/L    AST (SGOT) 23 15 - 37 U/L    Alk. phosphatase 89 45 - 117 U/L    Protein, total 7.9 6.4 - 8.2 g/dL    Albumin 3.9 3.5 - 5.0 g/dL    Globulin 4.0 2.0 - 4.0 g/dL    A-G Ratio 1.0 (L) 1.1 - 2.2     URINALYSIS W/MICROSCOPIC    Collection Time: 08/02/17  9:27 PM   Result Value Ref Range    Color RED      Appearance CLOUDY (A) CLEAR      Specific gravity 1.014 1.003 - 1.030      pH (UA) 6.5 5.0 - 8.0      Protein 30 (A) NEG mg/dL    Glucose NEGATIVE  NEG mg/dL    Ketone NEGATIVE  NEG mg/dL    Bilirubin NEGATIVE  NEG      Blood LARGE (A) NEG      Urobilinogen 0.2 0.2 - 1.0 EU/dL    Nitrites NEGATIVE  NEG      Leukocyte Esterase TRACE (A) NEG      WBC 0-4 0 - 4 /hpf    RBC >100 (H) 0 - 5 /hpf    Epithelial cells FEW FEW /lpf    Bacteria NEGATIVE  NEG /hpf    Hyaline cast 0-2 0 - 5 /lpf       IMAGING RESULTS:  CT ABD PELV W CONT   Final Result   INDICATION: bladder mass, hematuria, abnormal ultrasound     COMPARISON: March 24, 2007     TECHNIQUE:   Following the uneventful intravenous administration of 100 cc Isovue-370, thin  axial images were obtained through the abdomen and pelvis. Coronal and sagittal  reconstructions were generated. Oral contrast was not administered.  CT dose  reduction was achieved through use of a standardized protocol tailored for this  examination and automatic exposure control for dose modulation.     FINDINGS:   LUNG BASES: No abnormality. LIVER: Hepatic steatosis. GALLBLADDER: Unremarkable. SPLEEN: No enlargement or lesion. PANCREAS: No mass or ductal dilatation. ADRENALS: Low-density fullness both adrenal glands. KIDNEYS: Left renal scarring. Normal right kidney. No hydronephrosis. GI TRACT:  No bowel obstruction or wall thickening allowing for lack of oral  contrast  PERITONEUM: No free air or free fluid. APPENDIX: Unremarkable. RETROPERITONEUM: No lymphadenopathy or aortic aneurysm. ADDITIONAL COMMENTS: N/A.     URINARY BLADDER: There is a 2.4 x 1.5 cm hyperdense lesion in the left posterior  lateral urinary bladder, near the insertion of the left ureter. REPRODUCTIVE ORGANS: Unremarkable. LYMPH NODES:  None enlarged. FREE FLUID:  None. BONES: No destructive bone lesion. ADDITIONAL COMMENTS: N/A.     IMPRESSION:  2.4 x 1.5 cm soft tissue focus in the left lateral posterior urinary bladder  near the insertion of the left ureter, without hydronephrosis. This is  concerning for neoplasm, though hematoma is possible less likely. Cystoscopy may  be helpful to differentiate. No lymphadenopathy. US RETROPERITONEUM COMP   Final Result   INDICATION:  Gross hematuria      EXAM:  RENAL ULTRASOUND     COMPARISON:  None     TECHNIQUE: Routine ultrasound images of the kidneys and retroperitoneum were  obtained.     FINDINGS: The right kidney measures 11.7 cm in length. The left kidney measures  11.8 cm in length. Both kidneys demonstrate normal cortical echogenicity. No  renal calculus is seen. There is no hydronephrosis. The abdominal aorta is  obscured by bowel gas. The common iliac arteries are obscured by bowel gas. The inferior vena cava is unremarkable. There is a lobulated 2.1 x 1.4 cm  echogenic focus within the left lateral posterior and right bladder, which does  have some increased peripheral blood flow.  Sonographer noted possible minimal  movement on decubitus imaging although it did not move to the dependent portion  of the bladder.     IMPRESSION:   Intraluminal 2.1 x 1.4 cm echogenic and slightly vascular focus in the left  urinary bladder, more suspicious for mass then hematoma. CT may be helpful for  further characterization. MEDICATIONS GIVEN:  Medications   sodium chloride (NS) flush 10 mL (10 mL IntraVENous Given 8/3/17 0041)   iopamidol (ISOVUE-370) 76 % injection 100 mL (100 mL IntraVENous Given 8/3/17 0040)   0.9% sodium chloride infusion (50 mL/hr IntraVENous New Bag 8/3/17 0041)   potassium chloride (KLOR-CON) packet 20 mEq (20 mEq Oral Given 8/3/17 0144)       IMPRESSION:  1. Hematuria    2. Lesion of bladder        PLAN:  1. Discharge Medication List as of 8/3/2017  2:32 AM      CONTINUE these medications which have NOT CHANGED    Details   naproxen (NAPROSYN) 500 mg tablet Take 1 Tab by mouth two (2) times daily (with meals). , Normal, Disp-40 Tab, R-1      chlorthalidone (HYGROTEN) 25 mg tablet Take 1 Tab by mouth daily. , Normal, Disp-30 Tab, R-11      butalbital-acetaminophen (PHRENILIN)  mg tablet Take 1 Tab by mouth every six (6) hours as needed. , Print, Disp-30 Tab, R-2      traMADol (ULTRAM) 50 mg tablet Take 1 Tab by mouth every six (6) hours as needed for Pain. Max Daily Amount: 200 mg., Print, Disp-40 Tab, R-1      losartan (COZAAR) 100 mg tablet TAKE 1 TABLET BY MOUTH EVERY DAY, Normal, Disp-90 Tab, R-1      diclofenac EC (VOLTAREN) 75 mg EC tablet Take 1 Tab by mouth two (2) times daily (with meals). , Normal, Disp-30 Tab, R-3      zolpidem (AMBIEN) 5 mg tablet Take 1 Tab by mouth nightly as needed for Sleep. Max Daily Amount: 5 mg., Print, Disp-30 Tab, R-2      albuterol (PROVENTIL HFA, VENTOLIN HFA, PROAIR HFA) 90 mcg/actuation inhaler Take 2 Puffs by inhalation every four (4) hours as needed for Wheezing., Print, Disp-1 Inhaler, R-0      diltiazem (TIAZAC) 300 mg SR capsule TAKE ONE CAPSULE BY MOUTH EVERY DAY, Normal, Disp-90 Cap, R-1           2.    Follow-up Information Follow up With Details Comments Contact Info    C Rajesh Waters MD Schedule an appointment as soon as possible for a visit in 2 days  [address]  [address]      Ronda Banda MD In 1 day TO [address] [place] [address] IF NECESSARY [address]  [facility]  [phone]      [facility]  If symptoms worsen [address]  [address]  [phone]        Return to ED if worse   Discharge Note:  2:30 AM  The pt is ready for discharge. The pt's signs, symptoms, diagnosis, and discharge instructions have been discussed and pt has conveyed their understanding. The pt is to follow up as recommended or return to ER should their symptoms worsen. Plan has been discussed and pt is in agreement. This note is prepared by Colette Rodríguez, acting as a Scribe for Yordy Little MD.    Yordy Little MD: The scribe's documentation has been prepared under my direction and personally reviewed by me in its entirety. I confirm that the notes above accurately reflects all work, treatment, procedures, and medical decision making performed by me.

## 2017-08-03 NOTE — ED NOTES
Pt resting in stretcher. Call bell within reach. Updated on plan of care. Provided with medication. Ambulated with steady gait to restroom. No other complaints voiced at this time.

## 2017-08-03 NOTE — DISCHARGE INSTRUCTIONS
Cystoscopy: Before Your Procedure  What is a cystoscopy? A cystoscopy is a procedure that lets a doctor look inside your bladder and urethra. The urethra is the tube that carries urine from the bladder to outside the body. The doctor uses a thin, lighted tool called a cystoscope. With this tool, he or she can look for kidney or bladder stones. The doctor can also look for tumors, bleeding, or infection. If you are in a clinic and you are awake, you will get gel to numb your urethra. This makes the procedure more comfortable. Then the doctor puts the tube into your urethra and moves it into your bladder. Next, the doctor fills your bladder with liquid. This helps him or her see better. It may cause you to feel pressure in your bladder area for a short time. If you are in the hospital, you may get medicine to make you sleep during the procedure. While you are asleep, the doctor can take samples of tissue. These will be checked for cancer and other problems. This is called a biopsy. If you have a biopsy, you may have a small amount of blood in your urine for several days. You may also need a catheter. It's a tube that drains urine from your bladder. Your doctor will take it out at your follow-up visit. Follow-up care is a key part of your treatment and safety. Be sure to make and go to all appointments, and call your doctor if you are having problems. It's also a good idea to know your test results and keep a list of the medicines you take. What happens before the procedure? Procedures can be stressful. This information will help you understand what you can expect. And it will help you safely prepare for your procedure. Preparing for the procedure  · Understand exactly what procedure is planned, along with the risks, benefits, and other options. · Tell your doctors ALL the medicines, vitamins, supplements, and herbal remedies you take.  Some of these can increase the risk of bleeding or interact with anesthesia. · If you take blood thinners, such as warfarin (Coumadin), clopidogrel (Plavix), or aspirin, be sure to talk to your doctor. He or she will tell you if you should stop taking these medicines before your procedure. Make sure that you understand exactly what your doctor wants you to do. · Your doctor will tell you which medicines to take or stop before your procedure. You may need to stop taking certain medicines a week or more before the procedure. So talk to your doctor as soon as you can. · If you have an advance directive, let your doctor know. It may include a living will and a durable power of  for health care. Bring a copy to the hospital. If you don't have one, you may want to prepare one. It lets your doctor and loved ones know your health care wishes. Doctors advise that everyone prepare these papers before any type of surgery or procedure. What happens on the day of the procedure? · Follow the instructions exactly about when to stop eating and drinking. If you don't, your procedure may be canceled. If your doctor told you to take your medicines on the day of your procedure, take them with only a sip of water. · Take a bath or shower before you come in for your procedure. Do not apply lotions, perfumes, deodorants, or nail polish. · Take off all jewelry and piercings. And take out contact lenses, if you wear them. At the hospital or surgery center  · Bring a picture ID. · You will be asked to empty your bladder just before the procedure. · You will be kept comfortable and safe by your anesthesia provider. The anesthesia may make you sleep. Or it may just numb the area being worked on.  · In most cases, the cystoscope is in the bladder for less than 10 minutes. But the entire test may take up to 45 minutes or longer. Going home  · Be sure you have someone to drive you home. Anesthesia and pain medicine make it unsafe for you to drive.   · You will be given more specific instructions about recovering from your procedure. They will cover things like diet, wound care, follow-up care, driving, and getting back to your normal routine. When should you call your doctor? · You have questions or concerns. · You don't understand how to prepare for your procedure. · You become ill before the procedure (such as fever, flu, or a cold). · You need to reschedule or have changed your mind about having the procedure. Where can you learn more? Go to http://elijah-reagan.info/. Enter M065 in the search box to learn more about \"Cystoscopy: Before Your Procedure. \"  Current as of: November 11, 2016  Content Version: 11.3  © 3570-3570 [employer]. Care instructions adapted under license by [employer] (which disclaims liability or warranty for this information). If you have questions about a medical condition or this instruction, always ask your healthcare professional. [place] [address] any warranty or liability for your use of this information. Blood in the Urine: Care Instructions  Your Care Instructions  Blood in the urine, or hematuria, may make the urine look red, brown, or pink. There may be blood every time you urinate or just from time to time. You cannot always see blood in the urine, but it will show up in a urine test.  Blood in the urine may be serious. It should always be checked by a doctor. Your doctor may recommend more tests, including an X-ray, a CT scan, or a cystoscopy (which lets a doctor look inside the urethra and bladder). Blood in the urine can be a sign of another problem. Common causes are bladder infections and kidney stones. An injury to your groin or your genital area can also cause bleeding in the urinary tract. Very hard exercise--such as running a marathon--can cause blood in the urine. Blood in the urine can also be a sign of kidney disease or cancer in the bladder or kidney.  Many cases of blood in the urine are caused by a harmless condition that runs in families. This is called benign familial hematuria. It does not need any treatment. Sometimes your urine may look red or brown even though it does not contain blood. For example, not getting enough fluids (dehydration), taking certain medicines, or having a liver problem can change the color of your urine. Eating foods such as beets, rhubarb, or blackberries or foods with red food coloring can make your urine look red or pink. Follow-up care is a key part of your treatment and safety. Be sure to make and go to all appointments, and call your doctor if you are having problems. It's also a good idea to know your test results and keep a list of the medicines you take. When should you call for help? Call your doctor now or seek immediate medical care if:  · You have symptoms of a urinary infection. For example:  ¨ You have pus in your urine. ¨ You have pain in your back just below your rib cage. This is called flank pain. ¨ You have a fever, chills, or body aches. ¨ It hurts to urinate. ¨ You have groin or belly pain. · You have more blood in your urine. Watch closely for changes in your health, and be sure to contact your doctor if:  · You have new urination problems. · You do not get better as expected. Where can you learn more? Go to http://elijah-reagan.info/. Enter S915 in the search box to learn more about \"Blood in the Urine: Care Instructions. \"  Current as of: March 20, 2017  Content Version: 11.3  © 7749-0112 [employer]. Care instructions adapted under license by [employer] (which disclaims liability or warranty for this information). If you have questions about a medical condition or this instruction, always ask your healthcare professional. [address] any warranty or liability for your use of this information.

## 2017-08-04 LAB
BACTERIA SPEC CULT: NORMAL
CC UR VC: NORMAL
SERVICE CMNT-IMP: NORMAL

## 2017-11-06 ENCOUNTER — TELEPHONE (OUTPATIENT)
Dept: FAMILY MEDICINE CLINIC | Age: 52
End: 2017-11-06

## 2017-11-06 NOTE — TELEPHONE ENCOUNTER
----- Message from Shaunna Steve sent at 11/6/2017  9:04 AM EST -----  Regarding: Dr. Mabry Pay Telephone  The pt is having knee pain, and wanting an appt for a cortisone shot. Pt's best contact number is [phone].

## 2017-11-08 ENCOUNTER — OFFICE VISIT (OUTPATIENT)
Dept: FAMILY MEDICINE CLINIC | Age: 52
End: 2017-11-08

## 2017-11-08 VITALS
SYSTOLIC BLOOD PRESSURE: 126 MMHG | WEIGHT: 294 LBS | BODY MASS INDEX: 52.09 KG/M2 | HEIGHT: 63 IN | DIASTOLIC BLOOD PRESSURE: 86 MMHG | HEART RATE: 72 BPM | OXYGEN SATURATION: 98 % | TEMPERATURE: 98 F | RESPIRATION RATE: 20 BRPM

## 2017-11-08 DIAGNOSIS — R31.29 MICROSCOPIC HEMATURIA: ICD-10-CM

## 2017-11-08 DIAGNOSIS — M17.11 PRIMARY OSTEOARTHRITIS OF RIGHT KNEE: Primary | ICD-10-CM

## 2017-11-08 DIAGNOSIS — I10 ESSENTIAL HYPERTENSION, BENIGN: ICD-10-CM

## 2017-11-08 DIAGNOSIS — G43.909 MIGRAINE SYNDROME: ICD-10-CM

## 2017-11-08 DIAGNOSIS — G47.33 OSA (OBSTRUCTIVE SLEEP APNEA): ICD-10-CM

## 2017-11-08 RX ORDER — METHYLPREDNISOLONE ACETATE 40 MG/ML
40 INJECTION, SUSPENSION INTRA-ARTICULAR; INTRALESIONAL; INTRAMUSCULAR; SOFT TISSUE ONCE
Qty: 1 ML | Refills: 0
Start: 2017-11-08 | End: 2017-11-08

## 2017-11-08 NOTE — PATIENT INSTRUCTIONS
Joint Injections: Care Instructions  Your Care Instructions  Joint injections are shots into a joint, such as the knee. They may be used to put in medicines, such as pain relievers. Or they can be used to take out fluid. Sometimes the fluid is tested in a lab. This can help find the cause of a joint problem. A corticosteroid, or steroid, shot is used to reduce inflammation in tendons or joints. It is often used to treat problems such as arthritis, tendinitis, and bursitis. Steroids can be injected directly into a painful, inflamed joint. They can also help reduce inflammation of a bursa. A bursa is a sac of fluid. It cushions and lubricates areas where tendons, ligaments, skin, muscles, or bones rub against each other. A steroid shot can sometimes help with short-term pain relief when other treatments haven't worked. If steroid shots help, pain may improve for weeks or months. Follow-up care is a key part of your treatment and safety. Be sure to make and go to all appointments, and call your doctor if you are having problems. It's also a good idea to know your test results and keep a list of the medicines you take. How can you care for yourself at home? · Put ice or a cold pack on the area for 10 to 20 minutes at a time. Put a thin cloth between the ice and your skin. · Take anti-inflammatory medicines to reduce pain, swelling, or inflammation. These include ibuprofen (Advil, Motrin) and naproxen (Aleve). Read and follow all instructions on the label. · Avoid strenuous activities for several days, especially those that put stress on the area where you got the shot. · If you have dressings over the area, keep them clean and dry. You may remove them when your doctor tells you to. When should you call for help? Call your doctor now or seek immediate medical care if:  ? · You have signs of infection, such as:  ¨ Increased pain, swelling, warmth, or redness. ¨ Red streaks leading from the site.   ¨ Pus draining from the site. ¨ A fever. ? Watch closely for changes in your health, and be sure to contact your doctor if you have any problems. Where can you learn more? Go to http://elijah-reagan.info/. Enter N616 in the search box to learn more about \"Joint Injections: Care Instructions. \"  Current as of: March 21, 2017  Content Version: 11.4  © 3151-5435 [employer]. Care instructions adapted under license by [employer] (which disclaims liability or warranty for this information). If you have questions about a medical condition or this instruction, always ask your healthcare professional. [address] any warranty or liability for your use of this information.

## 2017-11-08 NOTE — MR AVS SNAPSHOT
Visit Information Date & Time Provider Department Dept. Phone Encounter #  
 11/8/2017 11:30 AM Feliz Dwyer, [facility] [phone] [id number] Upcoming Health Maintenance Date Due FOBT Q 1 YEAR AGE 50-75 9/20/2014 Influenza Age 5 to Adult 8/1/2017 DTaP/Tdap/Td series (2 - Td) 7/30/2026 Allergies as of 11/8/2017  Review Complete On: 11/8/2017 By: Renetta Billy No Known Allergies Current Immunizations  Never Reviewed Name Date Tdap 7/30/2016 12:33 AM  
  
 Not reviewed this visit You Were Diagnosed With   
  
 Codes Comments Primary osteoarthritis of right knee    -  Primary ICD-10-CM: M17.11 ICD-9-CM: 715.16 Essential hypertension, benign     ICD-10-CM: I10 
ICD-9-CM: 401.1 Migraine syndrome     ICD-10-CM: O56.875 ICD-9-CM: 346.00   
 WESTLEY (obstructive sleep apnea)     ICD-10-CM: G47.33 
ICD-9-CM: 327.23 Microscopic hematuria     ICD-10-CM: R31.29 ICD-9-CM: 599.72 Vitals BP Pulse Temp Resp Height(growth percentile) Weight(growth percentile) 126/86 (BP 1 Location: Left arm, BP Patient Position: Sitting) 72 98 °F (36.7 °C) (Oral) 20 5' 3\" (1.6 m) 294 lb (133.4 kg) SpO2 BMI Smoking Status 98% 52.08 kg/m2 Never Smoker Vitals History BMI and BSA Data Body Mass Index Body Surface Area 52.08 kg/m 2 2.44 m 2 Preferred Pharmacy Pharmacy Name Phone [facility]/PHARMACY #[id number]- [place], [place] - [address] [phone] Your Updated Medication List  
  
   
This list is accurate as of: 11/8/17 11:56 AM.  Always use your most recent med list.  
  
  
  
  
 albuterol 90 mcg/actuation inhaler Commonly known as:  PROVENTIL HFA, VENTOLIN HFA, PROAIR HFA Take 2 Puffs by inhalation every four (4) hours as needed for Wheezing. butalbital-acetaminophen  mg tablet Commonly known as:  Nicole Phillips  
 Take 1 Tab by mouth every six (6) hours as needed. chlorthalidone 25 mg tablet Commonly known as:  Mariah Marilyn Take 1 Tab by mouth daily. diclofenac EC 75 mg EC tablet Commonly known as:  VOLTAREN Take 1 Tab by mouth two (2) times daily (with meals). dilTIAZem 300 mg SR capsule Commonly known as:  [facility] TAKE ONE CAPSULE BY MOUTH EVERY DAY  
  
 losartan 100 mg tablet Commonly known as:  COZAAR  
TAKE 1 TABLET BY MOUTH EVERY DAY  
  
 naproxen 500 mg tablet Commonly known as:  NAPROSYN Take 1 Tab by mouth two (2) times daily (with meals). traMADol 50 mg tablet Commonly known as:  ULTRAM  
Take 1 Tab by mouth every six (6) hours as needed for Pain. Max Daily Amount: 200 mg.  
  
 zolpidem 5 mg tablet Commonly known as:  AMBIEN Take 1 Tab by mouth nightly as needed for Sleep. Max Daily Amount: 5 mg. Introducing [facility]! [employer] introduces [employer] patient portal. Now you can access parts of your medical record, email your doctor's office, and request medication refills online. 1. In your internet browser, go to [email or web address] 2. Click on the First Time User? Click Here link in the Sign In box. You will see the New Member Sign Up page. 3. Enter your [employer] Access Code exactly as it appears below. You will not need to use this code after youve completed the sign-up process. If you do not sign up before the expiration date, you must request a new code. · [employer] Access Code: [email or web address] Expires: 11/25/2017 12:54 PM 
 
4. Enter the last four digits of your Social Security Number (xxxx) and Date of Birth (mm/dd/yyyy) as indicated and click Submit. You will be taken to the next sign-up page. 5. Create a Harbor Techn[employer]iest ID. This will be your [employer] login ID and cannot be changed, so think of one that is secure and easy to remember. 6. Create a [employer] password. You can change your password at any time. 7. Enter your Password Reset Question and Answer. This can be used at a later time if you forget your password. 8. Enter your e-mail address. You will receive e-mail notification when new information is available in [address] 9. Click Sign Up. You can now view and download portions of your medical record. 10. Click the Download Summary menu link to download a portable copy of your medical information. If you have questions, please visit the Frequently Asked Questions section of the Cloopen website. Remember, Cloopen is NOT to be used for urgent needs. For medical emergencies, dial 911. Now available from your iPhone and Android! Please provide this summary of care documentation to your next provider. Your primary care clinician is listed as Naoma Brittle. If you have any questions after today's visit, please call [phone].

## 2017-11-08 NOTE — PROGRESS NOTES
Chief Complaint   Patient presents with    Knee Pain     Pt having R knee pain. [facility]  OFFICE PROCEDURE PROGRESS NOTE        Chart reviewed for the following:   Chapo Cano, have reviewed the History, Physical and updated the Allergic reactions for Fuglie 80 performed immediately prior to start of procedure:   Chapo Cano, have performed the following reviews on [address] [employer] [address] prior to the start of the procedure:            * Patient was identified by name and date of birth   * Agreement on procedure being performed was verified  * Risks and Benefits explained to the patient  * Procedure site verified and marked as necessary  * Patient was positioned for comfort  * Consent was signed and verified     Time: 12:30pm      Date of procedure: 11/8/2017    Procedure performed by: Monika Peoples MD    Provider assisted by: N/A     Patient assisted by: N/A    How tolerated by patient: Well    Post Procedural Pain Scale: 5/10    Comments: Pt received Right knee joint injection and tolerated it well; Pt pain after procedure a level 5/10.

## 2017-11-08 NOTE — PROGRESS NOTES
Indications:   Symptom relief from osteoarthritis,right knee    Procedure:  After consent was obtained,and procedure risks and benefits reviewed,using sterile technique the Right Knee Joint   was prepped using Betadine. The joint was entered usinga 23 ga needle and 40 mg of Depo-Medrol and 1 ml Marcaine were injected without difficulty ,and the needle was withdrawn. The procedure was well tolerated,with negligile discomfort postprocedure. The patient was asked to continue to rest the joint fora few days before resuming normal activities. They were advised that there may be increased pain for the next 1-2 days,and to watch for fever,redness,or increased swelling or pain. They were advised to call if such symptoms develop. Joint Injection instruction sheet was given to the patient and reviewed. The patient departed in good condition

## 2017-11-08 NOTE — PROGRESS NOTES
HISTORY OF PRESENT ILLNESS  Jim Petit III is a 47 y.o. male. f/u rt knee oa,increasing pain had steroid shot 6 mo ago with good results. Being treated for bladder ca pe urology,doing well  Knee Pain    This is a chronic problem. The problem occurs daily. The problem has been gradually worsening. The pain is present in the right knee. The pain is at a severity of 5/10. The pain is moderate. Review of Systems   Constitutional: Negative for fever and malaise/fatigue. Musculoskeletal: Positive for joint pain. Physical Exam   Constitutional: He appears well-developed and well-nourished. HENT:   Head: Normocephalic and atraumatic. Right Ear: External ear normal.   Left Ear: External ear normal.   Nose: Nose normal.   Mouth/Throat: Oropharynx is clear and moist.   Cardiovascular: Normal rate and regular rhythm. Abdominal: Soft. Bowel sounds are normal.   Musculoskeletal:        Right knee: He exhibits decreased range of motion, swelling and effusion. Tenderness found. Patellar tendon tenderness noted. Skin: Skin is warm and dry. ASSESSMENT and PLAN  Diagnoses and all orders for this visit:    1. Primary osteoarthritis of right knee  -     methylPREDNISolone acetate (DEPO-MEDROL) 40 mg/mL injection; 1 mL by IntraMUSCular route once for 1 dose. -     (DEPO-MEDROL 40 mg  -   quantity 1 for Reimbursement) METHYLPREDNISOLONE ACETATE 40 mg injection  -     01350 - DRAIN/INJECT LARGE JOINT/BURSA    2. Essential hypertension, benign    3. Migraine syndrome    4. WESTLEY (obstructive sleep apnea)    5.  Microscopic hematuria      Follow-up Disposition: Not on File

## 2017-11-14 RX ORDER — LOSARTAN POTASSIUM 100 MG/1
TABLET ORAL
Qty: 90 TAB | Refills: 1 | Status: SHIPPED | OUTPATIENT
Start: 2017-11-14 | End: 2018-05-01 | Stop reason: SDUPTHER

## 2017-11-21 ENCOUNTER — TELEPHONE (OUTPATIENT)
Dept: FAMILY MEDICINE CLINIC | Age: 52
End: 2017-11-21

## 2017-11-21 NOTE — TELEPHONE ENCOUNTER
Patient states that he saw red blood in his stool this morning and he states that he has bladder cancer and he is afraid.  Please call to advise [phone]

## 2018-01-02 DIAGNOSIS — M70.22 OLECRANON BURSITIS, LEFT ELBOW: ICD-10-CM

## 2018-01-02 DIAGNOSIS — M17.0 PRIMARY OSTEOARTHRITIS OF BOTH KNEES: Primary | ICD-10-CM

## 2018-01-02 RX ORDER — DICLOFENAC SODIUM 75 MG/1
75 TABLET, DELAYED RELEASE ORAL 2 TIMES DAILY WITH MEALS
Qty: 30 TAB | Refills: 3 | Status: SHIPPED | OUTPATIENT
Start: 2018-01-02 | End: 2018-08-09 | Stop reason: ALTCHOICE

## 2018-01-02 RX ORDER — PREDNISONE 10 MG/1
10 TABLET ORAL 2 TIMES DAILY
Qty: 10 TAB | Refills: 0 | Status: SHIPPED | OUTPATIENT
Start: 2018-01-02 | End: 2018-01-22 | Stop reason: ALTCHOICE

## 2018-01-22 ENCOUNTER — OFFICE VISIT (OUTPATIENT)
Dept: FAMILY MEDICINE CLINIC | Age: 53
End: 2018-01-22

## 2018-01-22 VITALS
RESPIRATION RATE: 26 BRPM | TEMPERATURE: 98.5 F | BODY MASS INDEX: 52.02 KG/M2 | HEIGHT: 63 IN | DIASTOLIC BLOOD PRESSURE: 98 MMHG | SYSTOLIC BLOOD PRESSURE: 140 MMHG | HEART RATE: 82 BPM | OXYGEN SATURATION: 97 % | WEIGHT: 293.6 LBS

## 2018-01-22 DIAGNOSIS — M54.6 ACUTE MIDLINE THORACIC BACK PAIN: Primary | ICD-10-CM

## 2018-01-22 PROBLEM — E66.01 OBESITY, MORBID (HCC): Status: ACTIVE | Noted: 2018-01-22

## 2018-01-22 RX ORDER — HYDROCODONE BITARTRATE AND ACETAMINOPHEN 5; 325 MG/1; MG/1
1 TABLET ORAL
Qty: 20 TAB | Refills: 0 | Status: SHIPPED | OUTPATIENT
Start: 2018-01-22 | End: 2018-08-09 | Stop reason: ALTCHOICE

## 2018-01-22 RX ORDER — DIAZEPAM 5 MG/1
5 TABLET ORAL
Qty: 30 TAB | Refills: 0 | Status: SHIPPED | OUTPATIENT
Start: 2018-01-22 | End: 2018-06-18 | Stop reason: SDUPTHER

## 2018-01-22 RX ORDER — PREDNISONE 10 MG/1
10 TABLET ORAL 2 TIMES DAILY
Qty: 10 TAB | Refills: 0 | Status: SHIPPED | OUTPATIENT
Start: 2018-01-22 | End: 2018-02-09 | Stop reason: ALTCHOICE

## 2018-01-22 RX ORDER — DILTIAZEM HYDROCHLORIDE 300 MG/1
CAPSULE, COATED, EXTENDED RELEASE ORAL
Refills: 2 | COMMUNITY
Start: 2017-11-14 | End: 2018-02-09 | Stop reason: SDUPTHER

## 2018-01-22 NOTE — LETTER
NOTIFICATION OF RETURN TO WORK / SCHOOL 
 
1/22/2018 11:44 AM 
 
Mr. Alcaraz Linus [address] [facility] [phone] Lynda Pacheco To Whom It May Concern: 
 
Jesus Alberto Acosta III was under the care of [facility] from 1/22/18 He will be able to return to work/school on 1/25/18 
 with no restrictions. If there are questions or concerns please have the patient contact our office. Sincerely, Allegra France MD

## 2018-01-22 NOTE — LETTER
NOTIFICATION OF RETURN TO WORK / SCHOOL 
 
1/22/2018 11:30 AM 
 
Mr. Edu [address] [phone] Tomas Lopes To Whom It May Concern: 
 
Daquan Calvo III was under the care of [facility] from 1/22/18. He will be able to return to work/school on 1/21/18 with no restrictions. If there are questions or concerns please have the patient contact our office. Sincerely, Isidoro Rivera MD

## 2018-01-22 NOTE — MR AVS SNAPSHOT
[facility] 
 
 
 [address] [facility] [address] [address] 
[phone] Patient: Leny Isaacs 
MRN: [id number] IKV:[id number] Visit Information Date & Time Provider Department Dept. Phone Encounter #  
 1/22/2018 11:00 AM Eric Martin [phone] [id number] Follow-up Instructions Return if symptoms worsen or fail to improve. Upcoming Health Maintenance Date Due FOBT Q 1 YEAR AGE 50-75 9/20/2014 Influenza Age 5 to Adult 8/1/2017 DTaP/Tdap/Td series (2 - Td) 7/30/2026 Allergies as of 1/22/2018  Review Complete On: 1/22/2018 By: Mirna Booker No Known Allergies Current Immunizations  Never Reviewed Name Date Tdap 7/30/2016 12:33 AM  
  
 Not reviewed this visit You Were Diagnosed With   
  
 Codes Comments Acute midline thoracic back pain    -  Primary ICD-10-CM: M54.6 ICD-9-CM: 724.1 Vitals BP Pulse Temp Resp Height(growth percentile) Weight(growth percentile) (!) 140/98 (BP 1 Location: Left arm, BP Patient Position: Sitting) 82 98.5 °F (36.9 °C) (Oral) 26 5' 3\" (1.6 m) 293 lb 9.6 oz (133.2 kg) SpO2 BMI Smoking Status 97% 52.01 kg/m2 Never Smoker Vitals History BMI and BSA Data Body Mass Index Body Surface Area 52.01 kg/m 2 2.43 m 2 Preferred Pharmacy Pharmacy Name Phone [facility]ACY [id number] [address] [phone] Your Updated Medication List  
  
   
This list is accurate as of: 1/22/18 11:31 AM.  Always use your most recent med list.  
  
  
  
  
 albuterol 90 mcg/actuation inhaler Commonly known as:  PROVENTIL HFA, VENTOLIN HFA, PROAIR HFA Take 2 Puffs by inhalation every four (4) hours as needed for Wheezing. butalbital-acetaminophen  mg tablet Commonly known as:  Guanako Jaime  
 Take 1 Tab by mouth every six (6) hours as needed. chlorthalidone 25 mg tablet Commonly known as:  Miguel Harsh Take 1 Tab by mouth daily. diazePAM 5 mg tablet Commonly known as:  VALIUM Take 1 Tab by mouth every six (6) hours as needed for Anxiety. Max Daily Amount: 20 mg.  
  
 diclofenac EC 75 mg EC tablet Commonly known as:  VOLTAREN Take 1 Tab by mouth two (2) times daily (with meals). * dilTIAZem 300 mg SR capsule Commonly known as:  [facility] TAKE ONE CAPSULE BY MOUTH EVERY DAY  
  
 * dilTIAZem  mg ER capsule Commonly known as:  CARDIZEM CD  
TAKE ONE CAPSULE BY MOUTH EVERY DAY  
  
 HYDROcodone-acetaminophen 5-325 mg per tablet Commonly known as:  Irma Bender Take 1 Tab by mouth every six (6) hours as needed for Pain. Max Daily Amount: 4 Tabs. losartan 100 mg tablet Commonly known as:  COZAAR  
TAKE 1 TABLET EVERY DAY  
  
 predniSONE 10 mg tablet Commonly known as:  Ruma Maxon Take 1 Tab by mouth two (2) times a day. traMADol 50 mg tablet Commonly known as:  ULTRAM  
Take 1 Tab by mouth every six (6) hours as needed for Pain. Max Daily Amount: 200 mg.  
  
 zolpidem 5 mg tablet Commonly known as:  AMBIEN Take 1 Tab by mouth nightly as needed for Sleep. Max Daily Amount: 5 mg.  
  
 * Notice: This list has 2 medication(s) that are the same as other medications prescribed for you. Read the directions carefully, and ask your doctor or other care provider to review them with you. Prescriptions Printed Refills  
 predniSONE (DELTASONE) 10 mg tablet 0 Sig: Take 1 Tab by mouth two (2) times a day. Class: Print Route: Oral  
 diazePAM (VALIUM) 5 mg tablet 0 Sig: Take 1 Tab by mouth every six (6) hours as needed for Anxiety. Max Daily Amount: 20 mg.  
 Class: Print Route: Oral  
 HYDROcodone-acetaminophen (NORCO) 5-325 mg per tablet 0 Sig: Take 1 Tab by mouth every six (6) hours as needed for Pain.  Max Daily Amount: 4 Tabs. Class: Print Route: Oral  
  
Follow-up Instructions Return if symptoms worsen or fail to improve. Introducing [facility]! Chele Mallory introduces BringMe[employer] patient portal. Now you can access parts of your medical record, email your doctor's office, and request medication refills online. 1. In your internet browser, go to [email or web address] 2. Click on the First Time User? Click Here link in the Sign In box. You will see the New Member Sign Up page. 3. Enter your BringMe[employer] Access Code exactly as it appears below. You will not need to use this code after youve completed the sign-up process. If you do not sign up before the expiration date, you must request a new code. · [employer] Access Code: [email or web address] Expires: 4/22/2018 11:31 AM 
 
4. Enter the last four digits of your Social Security Number (xxxx) and Date of Birth (mm/dd/yyyy) as indicated and click Submit. You will be taken to the next sign-up page. 5. Create a BringMeTheNews ID. This will be your BringMe[employer] login ID and cannot be changed, so think of one that is secure and easy to remember. 6. Create a BringMe[employer] password. You can change your password at any time. 7. Enter your Password Reset Question and Answer. This can be used at a later time if you forget your password. 8. Enter your e-mail address. You will receive e-mail notification when new information is available in [address] 9. Click Sign Up. You can now view and download portions of your medical record. 10. Click the Download Summary menu link to download a portable copy of your medical information. If you have questions, please visit the Frequently Asked Questions section of the BringMeThe[employer] website. Remember, BringMeThe[employer] is NOT to be used for urgent needs. For medical emergencies, dial 911. Now available from your iPhone and Android! Please provide this summary of care documentation to your next provider. Your primary care clinician is listed as Etta Pang. If you have any questions after today's visit, please call [phone].

## 2018-01-23 ENCOUNTER — HOSPITAL ENCOUNTER (OUTPATIENT)
Dept: GENERAL RADIOLOGY | Age: 53
Discharge: HOME | End: 2018-01-23
Payer: COMMERCIAL

## 2018-01-23 DIAGNOSIS — M54.6 ACUTE MIDLINE THORACIC BACK PAIN: ICD-10-CM

## 2018-01-23 DIAGNOSIS — M54.6 ACUTE MIDLINE THORACIC BACK PAIN: Primary | ICD-10-CM

## 2018-01-23 PROCEDURE — 72072 X-RAY EXAM THORAC SPINE 3VWS: CPT

## 2018-01-24 NOTE — PROGRESS NOTES
HISTORY OF PRESENT ILLNESS  Adalgisa Jackson III is a 47 y.o. male. 2 day severe midline upper back pain,nonradiating,no apparent injury  Back Pain    The history is provided by the patient. This is a new problem. The current episode started more than 2 days ago. The problem has not changed since onset. The problem occurs hourly. The pain is associated with no known injury. The pain is present in the thoracic spine. The quality of the pain is described as shooting. The pain does not radiate. The pain is at a severity of 6/10. The symptoms are aggravated by certain positions. Pertinent negatives include no chest pain, no fever and no tingling. Review of Systems   Constitutional: Negative for fever and malaise/fatigue. Cardiovascular: Negative for chest pain. Musculoskeletal: Positive for back pain. Negative for joint pain. Neurological: Negative for tingling. Physical Exam   Constitutional: He appears well-developed and well-nourished. HENT:   Head: Normocephalic and atraumatic. Right Ear: External ear normal.   Left Ear: External ear normal.   Nose: Nose normal.   Mouth/Throat: Oropharynx is clear and moist.   Cardiovascular: Normal rate and regular rhythm. Pulmonary/Chest: Effort normal and breath sounds normal.   Abdominal: Soft. Bowel sounds are normal.   Musculoskeletal:        Thoracic back: He exhibits decreased range of motion, tenderness and bony tenderness. He exhibits no deformity and no spasm. Back:        ASSESSMENT and PLAN  Diagnoses and all orders for this visit:    1. Acute midline thoracic back pain  -     predniSONE (DELTASONE) 10 mg tablet; Take 1 Tab by mouth two (2) times a day. -     diazePAM (VALIUM) 5 mg tablet; Take 1 Tab by mouth every six (6) hours as needed for Anxiety. Max Daily Amount: 20 mg.  -     HYDROcodone-acetaminophen (NORCO) 5-325 mg per tablet; Take 1 Tab by mouth every six (6) hours as needed for Pain. Max Daily Amount: 4 Tabs.       Follow-up Disposition:  Return if symptoms worsen or fail to improve.

## 2018-02-09 ENCOUNTER — OFFICE VISIT (OUTPATIENT)
Dept: FAMILY MEDICINE CLINIC | Age: 53
End: 2018-02-09

## 2018-02-09 VITALS
TEMPERATURE: 98.2 F | SYSTOLIC BLOOD PRESSURE: 144 MMHG | WEIGHT: 286.6 LBS | DIASTOLIC BLOOD PRESSURE: 86 MMHG | BODY MASS INDEX: 50.78 KG/M2 | HEART RATE: 81 BPM | OXYGEN SATURATION: 94 % | RESPIRATION RATE: 24 BRPM | HEIGHT: 63 IN

## 2018-02-09 DIAGNOSIS — M25.562 CHRONIC PAIN OF LEFT KNEE: Primary | ICD-10-CM

## 2018-02-09 DIAGNOSIS — M17.12 PRIMARY OSTEOARTHRITIS OF LEFT KNEE: ICD-10-CM

## 2018-02-09 DIAGNOSIS — I10 ESSENTIAL HYPERTENSION, BENIGN: ICD-10-CM

## 2018-02-09 DIAGNOSIS — G89.29 CHRONIC PAIN OF LEFT KNEE: Primary | ICD-10-CM

## 2018-02-09 DIAGNOSIS — G43.909 MIGRAINE SYNDROME: ICD-10-CM

## 2018-02-09 RX ORDER — TRAMADOL HYDROCHLORIDE 50 MG/1
50 TABLET ORAL
Qty: 40 TAB | Refills: 1 | Status: SHIPPED | OUTPATIENT
Start: 2018-02-09 | End: 2018-08-09 | Stop reason: ALTCHOICE

## 2018-02-09 RX ORDER — NAPROXEN 500 MG/1
500 TABLET ORAL 2 TIMES DAILY WITH MEALS
Qty: 60 TAB | Refills: 11 | Status: SHIPPED | OUTPATIENT
Start: 2018-02-09 | End: 2018-11-08 | Stop reason: SDUPTHER

## 2018-02-09 RX ORDER — METHYLPREDNISOLONE ACETATE 40 MG/ML
40 INJECTION, SUSPENSION INTRA-ARTICULAR; INTRALESIONAL; INTRAMUSCULAR; SOFT TISSUE ONCE
Qty: 1 ML | Refills: 0
Start: 2018-02-09 | End: 2018-02-09

## 2018-02-09 RX ORDER — PREDNISONE 10 MG/1
10 TABLET ORAL 2 TIMES DAILY
Qty: 10 TAB | Refills: 0 | Status: SHIPPED | OUTPATIENT
Start: 2018-02-09 | End: 2018-05-14 | Stop reason: ALTCHOICE

## 2018-02-09 NOTE — PROGRESS NOTES
HISTORY OF PRESENT ILLNESS  Maciel Thomas III is a 47 y.o. male. worsening chronic l knee pain and swelling. Upper back pain slowly improving  Knee Pain    The history is provided by the patient. This is a chronic problem. The problem occurs daily. The problem has been rapidly worsening. The pain is present in the left knee. The quality of the pain is described as aching. The pain is at a severity of 6/10. Associated symptoms include limited range of motion and back pain. The symptoms are aggravated by standing. Back Pain    This is a new problem. The current episode started more than 1 week ago. The problem has been gradually improving. The problem occurs daily. The pain is associated with no known injury. The pain is present in the upper back. The quality of the pain is described as aching. The pain is at a severity of 5/10. The pain is moderate. Pertinent negatives include no fever. Review of Systems   Constitutional: Negative for fever. Musculoskeletal: Positive for back pain and joint pain. Physical Exam   Constitutional: He appears well-developed and well-nourished. HENT:   Head: Normocephalic and atraumatic. Right Ear: External ear normal.   Left Ear: External ear normal.   Nose: Nose normal.   Mouth/Throat: Oropharynx is clear and moist.   Cardiovascular: Normal rate and regular rhythm. Pulmonary/Chest: Effort normal and breath sounds normal.   Musculoskeletal:        Left knee: He exhibits decreased range of motion, swelling and effusion. Tenderness found. Lateral joint line tenderness noted. Thoracic back: He exhibits tenderness and spasm. He exhibits normal range of motion. Back:        ASSESSMENT and PLAN  Diagnoses and all orders for this visit:    1. Chronic pain of left knee  -     predniSONE (DELTASONE) 10 mg tablet; Take 1 Tab by mouth two (2) times a day. -     naproxen (NAPROSYN) 500 mg tablet;  Take 1 Tab by mouth two (2) times daily (with meals) for 360 days.    2. Essential hypertension, benign    3. Migraine syndrome  -     traMADol (ULTRAM) 50 mg tablet; Take 1 Tab by mouth every six (6) hours as needed for Pain. Max Daily Amount: 200 mg. Follow-up Disposition: Not on File    Indications:   Symptom relief from osteoarthritis,Left Knee Joint    Procedure:  After consent was obtained,and procedure risks and benefits reviewed,using sterile technique the Left Knee Joint   was prepped using Betadine. The joint was entered usinga 23 ga needle and 40 mg of Depo-Medrol and 1 ml Marcaine were injected without difficulty ,and the needle was withdrawn. The procedure was well tolerated,with negligile discomfort postprocedure. The patient was asked to continue to rest the joint fora few days before resuming normal activities. They were advised that there may be increased pain for the next 1-2 days,and to watch for fever,redness,or increased swelling or pain. They were advised to call if such symptoms develop. Joint Injection instruction sheet was given to the patient and reviewed. The patient departed in good condition

## 2018-02-09 NOTE — PROGRESS NOTES
Chief Complaint   Patient presents with    Knee Pain     Pt having L knee pain.  Back Pain     Pt having back pain. [facility]  OFFICE PROCEDURE PROGRESS NOTE        Chart reviewed for the following:   Shoaib Yee, have reviewed the History, Physical and updated the Allergic reactions for Fuglie 80 performed immediately prior to start of procedure:   Shoaib Yee, have performed the following reviews on [employer] III prior to the start of the procedure:            * Patient was identified by name and date of birth   * Agreement on procedure being performed was verified  * Risks and Benefits explained to the patient  * Procedure site verified and marked as necessary  * Patient was positioned for comfort  * Consent was signed and verified     Time: 1:30pm      Date of procedure: 2/9/2018    Procedure performed by: Neel Schultz MD    Provider assisted by: N/A    Patient assisted by: n/A    How tolerated by patient: Well    Post Procedural Pain Scale: 5/10    Comments: Pt received Left knee joint injection and tolerated it well; Pt pain before was 7/10 and after a level 5/10.

## 2018-02-09 NOTE — PATIENT INSTRUCTIONS
Joint Injections: Care Instructions  Your Care Instructions  Joint injections are shots into a joint, such as the knee. They may be used to put in medicines, such as pain relievers. Or they can be used to take out fluid. Sometimes the fluid is tested in a lab. This can help find the cause of a joint problem. A corticosteroid, or steroid, shot is used to reduce inflammation in tendons or joints. It is often used to treat problems such as arthritis, tendinitis, and bursitis. Steroids can be injected directly into a painful, inflamed joint. They can also help reduce inflammation of a bursa. A bursa is a sac of fluid. It cushions and lubricates areas where tendons, ligaments, skin, muscles, or bones rub against each other. A steroid shot can sometimes help with short-term pain relief when other treatments haven't worked. If steroid shots help, pain may improve for weeks or months. Follow-up care is a key part of your treatment and safety. Be sure to make and go to all appointments, and call your doctor if you are having problems. It's also a good idea to know your test results and keep a list of the medicines you take. How can you care for yourself at home? · Put ice or a cold pack on the area for 10 to 20 minutes at a time. Put a thin cloth between the ice and your skin. · Take anti-inflammatory medicines to reduce pain, swelling, or inflammation. These include ibuprofen (Advil, Motrin) and naproxen (Aleve). Read and follow all instructions on the label. · Avoid strenuous activities for several days, especially those that put stress on the area where you got the shot. · If you have dressings over the area, keep them clean and dry. You may remove them when your doctor tells you to. When should you call for help? Call your doctor now or seek immediate medical care if:  ? · You have signs of infection, such as:  ¨ Increased pain, swelling, warmth, or redness. ¨ Red streaks leading from the site.   ¨ Pus draining from the site. ¨ A fever. ? Watch closely for changes in your health, and be sure to contact your doctor if you have any problems. Where can you learn more? Go to http://elijah-reagan.info/. Enter N616 in the search box to learn more about \"Joint Injections: Care Instructions. \"  Current as of: March 21, 2017  Content Version: 11.4  © 0771-0632 [employer]. Care instructions adapted under license by [employer] (which disclaims liability or warranty for this information). If you have questions about a medical condition or this instruction, always ask your healthcare professional. [address] any warranty or liability for your use of this information.

## 2018-02-09 NOTE — MR AVS SNAPSHOT
[address] [facility] 
 
 
 [address] [facility] [place] [address] [id number] [phone] Patient: Yossi Reyez 
MRN: [id number] KKA:1[birth year] Visit Information Date & Time Provider Department Dept. Phone Encounter #  
 2/9/2018 12:30 PM Eric Daniels [phone] [id number] Upcoming Health Maintenance Date Due FOBT Q 1 YEAR AGE 50-75 9/20/2014 Influenza Age 5 to Adult 8/1/2017 DTaP/Tdap/Td series (2 - Td) 7/30/2026 Allergies as of 2/9/2018  Review Complete On: 2/9/2018 By: Karel Haney No Known Allergies Current Immunizations  Never Reviewed Name Date Tdap 7/30/2016 12:33 AM  
  
 Not reviewed this visit You Were Diagnosed With   
  
 Codes Comments Chronic pain of left knee    -  Primary ICD-10-CM: M25.562, A89.88 ICD-9-CM: 719.46, 338.29 Essential hypertension, benign     ICD-10-CM: I10 
ICD-9-CM: 401.1 Migraine syndrome     ICD-10-CM: R06.416 ICD-9-CM: 346.00 Vitals BP Pulse Temp Resp Height(growth percentile) Weight(growth percentile) 144/86 (BP 1 Location: Left arm, BP Patient Position: Sitting) 81 98.2 °F (36.8 °C) (Oral) 24 5' 3\" (1.6 m) 286 lb 9.6 oz (130 kg) SpO2 BMI Smoking Status 94% 50.77 kg/m2 Never Smoker Vitals History BMI and BSA Data Body Mass Index Body Surface Area 50.77 kg/m 2 2.4 m 2 Preferred Pharmacy Pharmacy Name Phone [facility]ACY [id number] [address] [phone] Your Updated Medication List  
  
   
This list is accurate as of: 2/9/18  1:00 PM.  Always use your most recent med list.  
  
  
  
  
 albuterol 90 mcg/actuation inhaler Commonly known as:  PROVENTIL HFA, VENTOLIN HFA, PROAIR HFA Take 2 Puffs by inhalation every four (4) hours as needed for Wheezing. butalbital-acetaminophen  mg tablet Commonly known as:  Tucker Kehr Take 1 Tab by mouth every six (6) hours as needed. chlorthalidone 25 mg tablet Commonly known as:  Maria A Moise Take 1 Tab by mouth daily. diazePAM 5 mg tablet Commonly known as:  VALIUM Take 1 Tab by mouth every six (6) hours as needed for Anxiety. Max Daily Amount: 20 mg.  
  
 diclofenac EC 75 mg EC tablet Commonly known as:  VOLTAREN Take 1 Tab by mouth two (2) times daily (with meals). dilTIAZem 300 mg SR capsule Commonly known as:  [facility] TAKE ONE CAPSULE BY MOUTH EVERY DAY  
  
 HYDROcodone-acetaminophen 5-325 mg per tablet Commonly known as:  Amelia Quang Take 1 Tab by mouth every six (6) hours as needed for Pain. Max Daily Amount: 4 Tabs. losartan 100 mg tablet Commonly known as:  COZAAR  
TAKE 1 TABLET EVERY DAY  
  
 naproxen 500 mg tablet Commonly known as:  NAPROSYN Take 1 Tab by mouth two (2) times daily (with meals) for 360 days. predniSONE 10 mg tablet Commonly known as:  Verlyn Nick Take 1 Tab by mouth two (2) times a day. traMADol 50 mg tablet Commonly known as:  ULTRAM  
Take 1 Tab by mouth every six (6) hours as needed for Pain. Max Daily Amount: 200 mg.  
  
 zolpidem 5 mg tablet Commonly known as:  AMBIEN Take 1 Tab by mouth nightly as needed for Sleep. Max Daily Amount: 5 mg. Prescriptions Printed Refills  
 traMADol (ULTRAM) 50 mg tablet 1 Sig: Take 1 Tab by mouth every six (6) hours as needed for Pain. Max Daily Amount: 200 mg. Class: Print Route: Oral  
  
Prescriptions Sent to Pharmacy Refills  
 predniSONE (DELTASONE) 10 mg tablet 0 Sig: Take 1 Tab by mouth two (2) times a day. Class: Normal  
 Pharmacy: [facility]/pharmacy [facility] [address] Ph #: [phone] Route: Oral  
 naproxen (NAPROSYN) 500 mg tablet 11 Sig: Take 1 Tab by mouth two (2) times daily (with meals) for 360 days.   
 Class: Normal  
 Pharmacy: [address] Ph #: [phone] Route: Oral  
  
Introducing [facility]! [address] introduces PlaceIQ patient portal. Now you can access parts of your medical record, email your doctor's office, and request medication refills online. 1. In your internet browser, go to [email or web address]. [employer] 2. Click on the First Time User? Click Here link in the Sign In box. You will see the New Member Sign Up page. 3. Enter your PlaceIQ Access Code exactly as it appears below. You will not need to use this code after youve completed the sign-up process. If you do not sign up before the expiration date, you must request a new code. · PlaceIQ Access Code: [email or web address] Expires: 4/22/2018 11:31 AM 
 
4. Enter the last four digits of your Social Security Number (xxxx) and Date of Birth (mm/dd/yyyy) as indicated and click Submit. You will be taken to the next sign-up page. 5. Create a PlaceIQ ID. This will be your PlaceIQ login ID and cannot be changed, so think of one that is secure and easy to remember. 6. Create a PlaceIQ password. You can change your password at any time. 7. Enter your Password Reset Question and Answer. This can be used at a later time if you forget your password. 8. Enter your e-mail address. You will receive e-mail notification when new information is available in [address]. 9. Click Sign Up. You can now view and download portions of your medical record. 10. Click the Download Summary menu link to download a portable copy of your medical information. If you have questions, please visit the Frequently Asked Questions section of the PlaceIQ website. Remember, PlaceIQ is NOT to be used for urgent needs. For medical emergencies, dial 911. Now available from your iPhone and Android! Please provide this summary of care documentation to your next provider. Your primary care clinician is listed as Jolene Shah. If you have any questions after today's visit, please call [phone].

## 2018-02-20 RX ORDER — DILTIAZEM HYDROCHLORIDE 300 MG/1
CAPSULE, COATED, EXTENDED RELEASE ORAL
Qty: 90 CAP | Refills: 2 | Status: SHIPPED | OUTPATIENT
Start: 2018-02-20 | End: 2018-05-14 | Stop reason: SDUPTHER

## 2018-03-09 RX ORDER — DILTIAZEM HYDROCHLORIDE 300 MG/1
CAPSULE, COATED, EXTENDED RELEASE ORAL
Qty: 90 CAP | Refills: 2 | Status: SHIPPED | OUTPATIENT
Start: 2018-03-09 | End: 2019-04-03 | Stop reason: SDUPTHER

## 2018-05-02 RX ORDER — LOSARTAN POTASSIUM 100 MG/1
TABLET ORAL
Qty: 90 TAB | Refills: 1 | Status: SHIPPED | OUTPATIENT
Start: 2018-05-02 | End: 2018-08-09 | Stop reason: ALTCHOICE

## 2018-05-14 ENCOUNTER — OFFICE VISIT (OUTPATIENT)
Dept: FAMILY MEDICINE CLINIC | Age: 53
End: 2018-05-14

## 2018-05-14 VITALS
HEIGHT: 63 IN | BODY MASS INDEX: 52.52 KG/M2 | OXYGEN SATURATION: 97 % | HEART RATE: 77 BPM | DIASTOLIC BLOOD PRESSURE: 90 MMHG | TEMPERATURE: 98 F | RESPIRATION RATE: 20 BRPM | WEIGHT: 296.4 LBS | SYSTOLIC BLOOD PRESSURE: 128 MMHG

## 2018-05-14 DIAGNOSIS — F51.02 ADJUSTMENT INSOMNIA: ICD-10-CM

## 2018-05-14 DIAGNOSIS — M67.912 ROTATOR CUFF DISORDER, LEFT: ICD-10-CM

## 2018-05-14 DIAGNOSIS — M77.8 TENDONITIS OF ELBOW, LEFT: Primary | ICD-10-CM

## 2018-05-14 DIAGNOSIS — M75.102 ROTATOR CUFF SYNDROME OF LEFT SHOULDER: ICD-10-CM

## 2018-05-14 RX ORDER — METHYLPREDNISOLONE ACETATE 40 MG/ML
40 INJECTION, SUSPENSION INTRA-ARTICULAR; INTRALESIONAL; INTRAMUSCULAR; SOFT TISSUE ONCE
Qty: 1 ML | Refills: 0
Start: 2018-05-14 | End: 2018-05-14

## 2018-05-14 RX ORDER — TRAZODONE HYDROCHLORIDE 50 MG/1
50 TABLET ORAL
Qty: 30 TAB | Refills: 2 | Status: SHIPPED | OUTPATIENT
Start: 2018-05-14 | End: 2020-05-21 | Stop reason: DRUGHIGH

## 2018-05-14 NOTE — PATIENT INSTRUCTIONS
Joint Injections: Care Instructions  Your Care Instructions  Joint injections are shots into a joint, such as the knee. They may be used to put in medicines, such as pain relievers. Or they can be used to take out fluid. Sometimes the fluid is tested in a lab. This can help find the cause of a joint problem. A corticosteroid, or steroid, shot is used to reduce inflammation in tendons or joints. It is often used to treat problems such as arthritis, tendinitis, and bursitis. Steroids can be injected directly into a painful, inflamed joint. They can also help reduce inflammation of a bursa. A bursa is a sac of fluid. It cushions and lubricates areas where tendons, ligaments, skin, muscles, or bones rub against each other. A steroid shot can sometimes help with short-term pain relief when other treatments haven't worked. If steroid shots help, pain may improve for weeks or months. Follow-up care is a key part of your treatment and safety. Be sure to make and go to all appointments, and call your doctor if you are having problems. It's also a good idea to know your test results and keep a list of the medicines you take. How can you care for yourself at home? · Put ice or a cold pack on the area for 10 to 20 minutes at a time. Put a thin cloth between the ice and your skin. · Take anti-inflammatory medicines to reduce pain, swelling, or inflammation. These include ibuprofen (Advil, Motrin) and naproxen (Aleve). Read and follow all instructions on the label. · Avoid strenuous activities for several days, especially those that put stress on the area where you got the shot. · If you have dressings over the area, keep them clean and dry. You may remove them when your doctor tells you to. When should you call for help? Call your doctor now or seek immediate medical care if:  ? · You have signs of infection, such as:  ¨ Increased pain, swelling, warmth, or redness. ¨ Red streaks leading from the site.   ¨ Pus draining from the site. ¨ A fever. ? Watch closely for changes in your health, and be sure to contact your doctor if you have any problems. Where can you learn more? Go to http://elijah-reagan.info/. Enter N616 in the search box to learn more about \"Joint Injections: Care Instructions. \"  Current as of: March 21, 2017  Content Version: 11.4  © 2994-5911 [employer]. Care instructions adapted under license by [employer] (which disclaims liability or warranty for this information). If you have questions about a medical condition or this instruction, always ask your healthcare professional. [address] any warranty or liability for your use of this information.

## 2018-05-14 NOTE — PROGRESS NOTES
HISTORY OF PRESENT ILLNESS  Lizeth Page III is a 47 y.o. male. worsening l shoulder pain and l elbow pain,chronic. Aggravated by work,driving forklift  Elbow Pain    This is a recurrent problem. The problem occurs daily. The problem has been gradually worsening. The pain is present in the left elbow. The quality of the pain is described as aching. The pain is at a severity of 4/10. The pain is moderate. Shoulder Pain    The history is provided by the patient. The incident occurred at work. There was no injury mechanism. The pain is at a severity of 4/10. The pain is moderate. The pain has been intermittent since onset. There is no history of shoulder injury. There is no history of shoulder surgery. Review of Systems   Constitutional: Negative for fever. Genitourinary: Negative for frequency. Musculoskeletal: Positive for joint pain. Skin: Negative for rash. Physical Exam   Constitutional: He appears well-developed and well-nourished. HENT:   Head: Normocephalic and atraumatic. Right Ear: External ear normal.   Left Ear: External ear normal.   Nose: Nose normal.   Mouth/Throat: Oropharynx is clear and moist.   Cardiovascular: Normal rate and regular rhythm. Pulmonary/Chest: Effort normal and breath sounds normal.   Musculoskeletal:        Left shoulder: He exhibits decreased range of motion, tenderness and decreased strength. He exhibits no crepitus. Left elbow: He exhibits decreased range of motion and swelling. He exhibits no deformity. Tenderness found. Olecranon process tenderness noted. Rotator Cuff maneuvers were suggestive of RC Disorder     Skin: Skin is warm and dry. ASSESSMENT and PLAN  Diagnoses and all orders for this visit:    1. Tendonitis of elbow, left    2. Rotator cuff disorder, left    3. Adjustment insomnia  -     traZODone (DESYREL) 50 mg tablet; Take 1 Tab by mouth nightly.     4. Rotator cuff syndrome of left shoulder  -     methylPREDNISolone acetate (DEPO-MEDROL) 40 mg/mL injection; 1 mL by IntraMUSCular route once for 1 dose. -     (DEPO-MEDROL 40 mg  -   quantity 1 for Reimbursement) METHYLPREDNISOLONE ACETATE 40 mg injection  -     43148 - DRAIN/INJECT LARGE JOINT/BURSA      Follow-up Disposition:  Return in about 3 months (around 8/14/2018). Indications:   Left Rotator Cuff Disorder    Procedure:  After consent was obtained,and procedure risks and benefits reviewed,using sterile technique the Left Shoulder Joint   was prepped using Betadine. The joint was entered usinga 23 ga needle and 40 mg of Depo-Medrol and 1 ml Marcaine were injected without difficulty ,and the needle was withdrawn. The procedure was well tolerated,with negligile discomfort postprocedure. The patient was asked to continue to rest the joint fora few days before resuming normal activities. They were advised that there may be increased pain for the next 1-2 days,and to watch for fever,redness,or increased swelling or pain. They were advised to call if such symptoms develop. Joint Injection instruction sheet was given to the patient and reviewed. The patient departed in good condition

## 2018-05-14 NOTE — LETTER
NOTIFICATION OF RETURN TO WORK / SCHOOL 
 
5/14/2018 10:10 AM 
 
Mr. [address] [address] Damon Renee To Whom It May Concern: 
 
Sharon Loyola III was under the care of [facility] from 5/14/18. He will be able to return to work/school on 5/15/18 with no restrictions. If there are questions or concerns please have the patient contact our office. Sincerely, Tasha Zuleta MD

## 2018-05-14 NOTE — MR AVS SNAPSHOT
[address] [facility] [place] 
 
 
 [address] [facility] [address] [id number] [phone] Patient: Akin Bejarano 
MRN: [id number] LOF:2/29[birth year]4 Visit Information Date & Time Provider Department Dept. Phone Encounter #  
 5/14/2018  9:45 AM Eric Rodriguez Camron [phone] [id number] Follow-up Instructions Return in about 3 months (around 8/14/2018). Upcoming Health Maintenance Date Due FOBT Q 1 YEAR AGE 50-75 9/20/2014 Influenza Age 5 to Adult 8/1/2018 DTaP/Tdap/Td series (2 - Td) 7/30/2026 Allergies as of 5/14/2018  Review Complete On: 5/14/2018 By: Gerson Phillips MD  
 No Known Allergies Current Immunizations  Never Reviewed Name Date Tdap 7/30/2016 12:33 AM  
  
 Not reviewed this visit You Were Diagnosed With   
  
 Codes Comments Tendonitis of elbow, left    -  Primary ICD-10-CM: M77.8 ICD-9-CM: 727.09 Rotator cuff disorder, left     ICD-10-CM: V93.008 ICD-9-CM: 726.10 Adjustment insomnia     ICD-10-CM: F51.02 
ICD-9-CM: 307.41 Vitals BP Pulse Temp Resp Height(growth percentile) Weight(growth percentile) 128/90 (BP 1 Location: Left arm, BP Patient Position: Sitting) 77 98 °F (36.7 °C) (Oral) 20 5' 3\" (1.6 m) 296 lb 6.4 oz (134.4 kg) SpO2 BMI Smoking Status 97% 52.5 kg/m2 Never Smoker Vitals History BMI and BSA Data Body Mass Index Body Surface Area 52.5 kg/m 2 2.44 m 2 Preferred Pharmacy Pharmacy Name Phone [facility]ARMACY [facility][id number] [place] [address] [phone] Your Updated Medication List  
  
   
This list is accurate as of 5/14/18 10:11 AM.  Always use your most recent med list.  
  
  
  
  
 albuterol 90 mcg/actuation inhaler Commonly known as:  PROVENTIL HFA, VENTOLIN HFA, PROAIR HFA  
 Take 2 Puffs by inhalation every four (4) hours as needed for Wheezing. butalbital-acetaminophen  mg tablet Commonly known as:  Edwinna Brooklyn Take 1 Tab by mouth every six (6) hours as needed. chlorthalidone 25 mg tablet Commonly known as:  Antonia Pinch Take 1 Tab by mouth daily. diazePAM 5 mg tablet Commonly known as:  VALIUM Take 1 Tab by mouth every six (6) hours as needed for Anxiety. Max Daily Amount: 20 mg.  
  
 diclofenac EC 75 mg EC tablet Commonly known as:  VOLTAREN Take 1 Tab by mouth two (2) times daily (with meals). dilTIAZem  mg ER capsule Commonly known as:  CARDIZEM CD  
TAKE ONE CAPSULE BY MOUTH EVERY DAY  
  
 HYDROcodone-acetaminophen 5-325 mg per tablet Commonly known as:  Nancy Bond Take 1 Tab by mouth every six (6) hours as needed for Pain. Max Daily Amount: 4 Tabs. losartan 100 mg tablet Commonly known as:  COZAAR  
TAKE 1 TABLET BY MOUTH EVERY DAY  
  
 naproxen 500 mg tablet Commonly known as:  NAPROSYN Take 1 Tab by mouth two (2) times daily (with meals) for 360 days. traMADol 50 mg tablet Commonly known as:  ULTRAM  
Take 1 Tab by mouth every six (6) hours as needed for Pain. Max Daily Amount: 200 mg.  
  
 traZODone 50 mg tablet Commonly known as:  Katie Guy Take 1 Tab by mouth nightly. zolpidem 5 mg tablet Commonly known as:  AMBIEN Take 1 Tab by mouth nightly as needed for Sleep. Max Daily Amount: 5 mg. Prescriptions Sent to Pharmacy Refills  
 traZODone (DESYREL) 50 mg tablet 2 Sig: Take 1 Tab by mouth nightly. Class: Normal  
 Pharmacy: [facility]/pharmacy [address] Ph #: [phone] Route: Oral  
  
Follow-up Instructions Return in about 3 months (around 8/14/2018). Introducing [facility]!    
 [employer] introduces Flite patient portal. Now you can access parts of your medical record, email your doctor's office, and request medication refills online. 1. In your internet browser, go to [email or web address] 2. Click on the First Time User? Click Here link in the Sign In box. You will see the New Member Sign Up page. 3. Enter your EXFO Access Code exactly as it appears below. You will not need to use this code after youve completed the sign-up process. If you do not sign up before the expiration date, you must request a new code. · EXFO Access Code: [email or web address] Expires: 8/12/2018 10:11 AM 
 
4. Enter the last four digits of your Social Security Number (xxxx) and Date of Birth (mm/dd/yyyy) as indicated and click Submit. You will be taken to the next sign-up page. 5. Create a EXFO ID. This will be your EXFO login ID and cannot be changed, so think of one that is secure and easy to remember. 6. Create a EXFO password. You can change your password at any time. 7. Enter your Password Reset Question and Answer. This can be used at a later time if you forget your password. 8. Enter your e-mail address. You will receive e-mail notification when new information is available in [address] 9. Click Sign Up. You can now view and download portions of your medical record. 10. Click the Download Summary menu link to download a portable copy of your medical information. If you have questions, please visit the Frequently Asked Questions section of the EXFO website. Remember, EXFO is NOT to be used for urgent needs. For medical emergencies, dial 911. Now available from your iPhone and Android! Please provide this summary of care documentation to your next provider. Your primary care clinician is listed as Dino Harris. If you have any questions after today's visit, please call [phone].

## 2018-06-18 DIAGNOSIS — M54.6 ACUTE MIDLINE THORACIC BACK PAIN: ICD-10-CM

## 2018-06-18 NOTE — TELEPHONE ENCOUNTER
Patient would like to have 2 RX's refilled diazePAM (VALIUM) 5 mg tablet he wants  to give him cialis his insurance will cover it he can be reached @ [phone]

## 2018-06-19 RX ORDER — TADALAFIL 20 MG/1
20 TABLET ORAL AS NEEDED
Qty: 6 TAB | Refills: 11 | Status: SHIPPED | OUTPATIENT
Start: 2018-06-19 | End: 2018-08-09 | Stop reason: ALTCHOICE

## 2018-06-19 RX ORDER — DIAZEPAM 5 MG/1
5 TABLET ORAL
Qty: 30 TAB | Refills: 0 | Status: SHIPPED | OUTPATIENT
Start: 2018-06-19 | End: 2018-08-09 | Stop reason: ALTCHOICE

## 2018-06-28 DIAGNOSIS — I10 ESSENTIAL HYPERTENSION, BENIGN: ICD-10-CM

## 2018-06-28 RX ORDER — CHLORTHALIDONE 25 MG/1
TABLET ORAL
Qty: 30 TAB | Refills: 10 | Status: SHIPPED | OUTPATIENT
Start: 2018-06-28 | End: 2018-11-05 | Stop reason: SDUPTHER

## 2018-08-09 ENCOUNTER — OFFICE VISIT (OUTPATIENT)
Dept: FAMILY MEDICINE CLINIC | Age: 53
End: 2018-08-09

## 2018-08-09 VITALS
HEART RATE: 82 BPM | SYSTOLIC BLOOD PRESSURE: 136 MMHG | RESPIRATION RATE: 20 BRPM | DIASTOLIC BLOOD PRESSURE: 92 MMHG | HEIGHT: 63 IN | WEIGHT: 305.4 LBS | BODY MASS INDEX: 54.11 KG/M2 | OXYGEN SATURATION: 95 % | TEMPERATURE: 97.7 F

## 2018-08-09 DIAGNOSIS — I10 ESSENTIAL HYPERTENSION, BENIGN: ICD-10-CM

## 2018-08-09 DIAGNOSIS — E78.2 MIXED HYPERLIPIDEMIA: ICD-10-CM

## 2018-08-09 DIAGNOSIS — M17.12 PRIMARY OSTEOARTHRITIS OF LEFT KNEE: ICD-10-CM

## 2018-08-09 DIAGNOSIS — C67.9 MALIGNANT NEOPLASM OF URINARY BLADDER, UNSPECIFIED SITE (HCC): ICD-10-CM

## 2018-08-09 DIAGNOSIS — R73.9 HYPERGLYCEMIA: Primary | ICD-10-CM

## 2018-08-09 DIAGNOSIS — R73.02 IGT (IMPAIRED GLUCOSE TOLERANCE): ICD-10-CM

## 2018-08-09 LAB
BILIRUB UR QL STRIP: NEGATIVE
GLUCOSE POC: 161 MG/DL
GLUCOSE UR-MCNC: NEGATIVE MG/DL
HBA1C MFR BLD HPLC: 7.9 %
KETONES P FAST UR STRIP-MCNC: NEGATIVE MG/DL
PH UR STRIP: 6 [PH] (ref 4.6–8)
PROT UR QL STRIP: NEGATIVE
SP GR UR STRIP: 1.02 (ref 1–1.03)
UA UROBILINOGEN AMB POC: NORMAL (ref 0.2–1)
URINALYSIS CLARITY POC: CLEAR
URINALYSIS COLOR POC: YELLOW
URINE BLOOD POC: NORMAL
URINE LEUKOCYTES POC: NEGATIVE
URINE NITRITES POC: NEGATIVE

## 2018-08-09 RX ORDER — METHYLPREDNISOLONE ACETATE 40 MG/ML
40 INJECTION, SUSPENSION INTRA-ARTICULAR; INTRALESIONAL; INTRAMUSCULAR; SOFT TISSUE ONCE
Qty: 1 ML | Refills: 0
Start: 2018-08-09 | End: 2018-08-09

## 2018-08-09 RX ORDER — RAMIPRIL 10 MG/1
10 CAPSULE ORAL DAILY
Qty: 30 CAP | Refills: 11 | Status: SHIPPED | OUTPATIENT
Start: 2018-08-09 | End: 2018-09-12 | Stop reason: SINTOL

## 2018-08-09 NOTE — PROGRESS NOTES
Chief Complaint   Patient presents with    Blood sugar problem     Pt state he has elevated BS. DEPARTMENT OF [facility]  OFFICE PROCEDURE PROGRESS NOTE        Chart reviewed for the following:   Davis Horan, have reviewed the History, Physical and updated the Allergic reactions for [address] performed immediately prior to start of procedure:   Davis Horan, have performed the following reviews on [address] prior to the start of the procedure:            * Patient was identified by name and date of birth   * Agreement on procedure being performed was verified  * Risks and Benefits explained to the patient  * Procedure site verified and marked as necessary  * Patient was positioned for comfort  * Consent was signed and verified     Time: 8:43 am      Date of procedure: 8/9/2018    Procedure performed by: Dante Bhandari MD    Provider assisted by: N/A    Patient assisted by: N/A    How tolerated by patient: Well    Post Procedural Pain Scale: 7/10    Comments: Pt received Left knee joint injection and tolerated it well; Pt pain after injection  Was 5/10 and before 7/10.

## 2018-08-09 NOTE — PROGRESS NOTES
HISTORY OF PRESENT ILLNESS  Renny Schultz III is a 47 y.o. male  Sugars up on home testing, worsening. l knee pain formonths  Blood sugar problem   This is a new problem. The problem occurs every several days. The problem has been gradually worsening. Pertinent negatives include no headaches and no shortness of breath. Knee Pain    The history is provided by the patient. This is a chronic problem. The problem occurs daily. The problem has been gradually worsening. The pain is present in the right knee and left knee. The quality of the pain is described as aching. The pain is at a severity of 4/10. The pain is moderate. The symptoms are aggravated by activity. Hypertension    This is a chronic problem. The problem has not changed since onset. Pertinent negatives include no malaise/fatigue, no headaches, no peripheral edema, no dizziness and no shortness of breath. Review of Systems   Constitutional: Negative for fever and malaise/fatigue. Respiratory: Negative for shortness of breath. Genitourinary: Negative for frequency. Musculoskeletal: Positive for joint pain. Skin: Negative for rash. Neurological: Negative for dizziness and headaches. Physical Exam   Constitutional: He is oriented to person, place, and time. He appears well-developed and well-nourished. HENT:   Head: Normocephalic and atraumatic. Right Ear: External ear normal.   Left Ear: External ear normal.   Nose: Nose normal.   Mouth/Throat: Oropharynx is clear and moist.   Cardiovascular: Normal rate and regular rhythm. Pulmonary/Chest: Effort normal and breath sounds normal.   Abdominal: Soft. Bowel sounds are normal.   Musculoskeletal:        Left elbow: He exhibits normal range of motion, no swelling, no effusion, no deformity and no laceration. Tenderness found. Olecranon process tenderness noted. Right knee: He exhibits decreased range of motion. He exhibits no swelling.         Left knee: He exhibits decreased range of motion, swelling and effusion. Tenderness found. Medial joint line tenderness noted. Arms:  Neurological: He is alert and oriented to person, place, and time. Skin: Skin is warm and dry. ASSESSMENT and PLAN  Diagnoses and all orders for this visit:    1. Hyperglycemia  -     METABOLIC PANEL, COMPREHENSIVE  -     AMB POC HEMOGLOBIN A1C  -     AMB POC GLUCOSE, QUANTITATIVE, BLOOD    2. IGT (impaired glucose tolerance)    3. Essential hypertension, benign  -     METABOLIC PANEL, COMPREHENSIVE    4. Mixed hyperlipidemia  -     LIPID PANEL      Follow-up Disposition: Not on File  Indications:   Right Knee Osteoarthritis    Procedure:After consent was obtained,and procedure risks and benefits reviewed,using sterile technique the Right Knee Joint   was prepped using Betadine. The joint was entered usinga 23 ga needle and 40 mg of Depo-Medrol and 1 ml Marcaine were injected without difficulty ,and the needle was withdrawn. The procedure was well tolerated,with negligile discomfort postprocedure. The patient was asked to continue to rest the joint fora few days before resuming normal activities. They were advised that there may be increased pain for the next 1-2 days,and to watch for fever,redness,or increased swelling or pain. They were advised to call if such symptoms develop. Joint Injection instruction sheet was given to the patient and reviewed. The patient departed in good condition        Indications:   Symptom relief from osteoarthritis    Procedure:  After consent was obtained,and procedure risks and benefits reviewed,using sterile technique the Left Knee Joint    was prepped using Betadine. The joint was entered usinga 23 ga needle and 40 mg of Depo-Medrol and 1 ml Marcaine were injected without difficulty ,and the needle was withdrawn. The procedure was well tolerated,with negligile discomfort postprocedure. The patient was asked to continue to rest the joint fora few days before resuming normal activities. They were advised that there may be increased pain for the next 1-2 days,and to watch for fever,redness,or increased swelling or pain. They were advised to call if such symptoms develop. Joint Injection instruction sheet was given to the patient and reviewed. The patient departed in good condition

## 2018-08-09 NOTE — MR AVS SNAPSHOT
[facility] 
 
 
 [address] [facility] [address] [address] 
[phone] Patient: Luc Mix 
MRN: [id number] Visit Information Date & Time Provider Department Dept. Phone Encounter #  
 8/9/2018  8:15 AM Webster Severs, [address] [place] [address] [phone] [id number] Your Appointments 8/13/2018 12:00 PM  
Any with Webster Severs, MD  
[facility] [address] [facility]) Appt Note: f/u  
 [address] [facility] [address] [address]  
[phone] [id number] Upcoming Health Maintenance Date Due FOBT Q 1 YEAR AGE 50-75 9/20/2014 Influenza Age 5 to Adult 8/1/2018 DTaP/Tdap/Td series (2 - Td) 7/30/2026 Allergies as of 8/9/2018  Review Complete On: 8/9/2018 By: Webster Severs, MD  
 No Known Allergies Current Immunizations  Never Reviewed Name Date Tdap 7/30/2016 12:33 AM  
  
 Not reviewed this visit You Were Diagnosed With   
  
 Codes Comments Hyperglycemia    -  Primary ICD-10-CM: R73.9 ICD-9-CM: 790.29 IGT (impaired glucose tolerance)     ICD-10-CM: R73.02 
ICD-9-CM: 790.22 Essential hypertension, benign     ICD-10-CM: I10 
ICD-9-CM: 401.1 Mixed hyperlipidemia     ICD-10-CM: E78.2 ICD-9-CM: 272.2 Malignant neoplasm of urinary bladder, unspecified site [facility])     ICD-10-CM: C67.9 ICD-9-CM: 188.9 Primary osteoarthritis of left knee     ICD-10-CM: M17.12 
ICD-9-CM: 715.16 Vitals BP Pulse Temp Resp Height(growth percentile) Weight(growth percentile) (!) 136/92 82 97.7 °F (36.5 °C) (Oral) 20 5' 3\" (1.6 m) 305 lb 6.4 oz (138.5 kg) SpO2 BMI Smoking Status 95% 54.1 kg/m2 Never Smoker Vitals History BMI and BSA Data Body Mass Index Body Surface Area 54.1 kg/m 2 2.48 m 2 Preferred Pharmacy Pharmacy Name Phone [facility]/PHARMACY #[id number]- [place], [place] - [address] [phone] Your Updated Medication List  
  
   
This list is accurate as of 8/9/18  9:07 AM.  Always use your most recent med list.  
  
  
  
  
 butalbital-acetaminophen  mg tablet Commonly known as:  Charmayne Rower Take 1 Tab by mouth every six (6) hours as needed. chlorthalidone 25 mg tablet Commonly known as:  HYGROTEN  
TAKE 1 TAB BY MOUTH DAILY. dilTIAZem  mg ER capsule Commonly known as:  CARDIZEM CD  
TAKE ONE CAPSULE BY MOUTH EVERY DAY  
  
 naproxen 500 mg tablet Commonly known as:  NAPROSYN Take 1 Tab by mouth two (2) times daily (with meals) for 360 days. ramipril 10 mg capsule Commonly known as:  ALTACE Take 1 Cap by mouth daily. traZODone 50 mg tablet Commonly known as:  Jordon Irwinton Take 1 Tab by mouth nightly. Prescriptions Printed Refills  
 ramipril (ALTACE) 10 mg capsule 11 Sig: Take 1 Cap by mouth daily. Class: Print Route: Oral  
  
We Performed the Following AMB POC GLUCOSE, QUANTITATIVE, BLOOD [66582 CPT(R)] AMB POC HEMOGLOBIN A1C [09923 CPT(R)] AMB POC URINALYSIS DIP STICK AUTO W/ MICRO [52131 CPT(R)]  DIABETES EDUCATION [HM5 Custom] LIPID PANEL [31625 CPT(R)] METABOLIC PANEL, COMPREHENSIVE [70188 CPT(R)] Introducing [facility]! [employer] introduces [employer] patient portal. Now you can access parts of your medical record, email your doctor's office, and request medication refills online. 1. In your internet browser, go to [email or web address] 2. Click on the First Time User? Click Here link in the Sign In box. You will see the New Member Sign Up page. 3. Enter your [employer] Access Code exactly as it appears below. You will not need to use this code after youve completed the sign-up process.  If you do not sign up before the expiration date, you must request a new code. · [email or web address] Access Code: [email or web address] Expires: 8/12/2018 10:11 AM 
 
4. Enter the last four digits of your Social Security Number (xxxx) and Date of Birth (mm/dd/yyyy) as indicated and click Submit. You will be taken to the next sign-up page. 5. Create a [email or web address] ID. This will be your [email or web address] login ID and cannot be changed, so think of one that is secure and easy to remember. 6. Create a [email or web address] password. You can change your password at any time. 7. Enter your Password Reset Question and Answer. This can be used at a later time if you forget your password. 8. Enter your e-mail address. You will receive e-mail notification when new information is available in [address] 9. Click Sign Up. You can now view and download portions of your medical record. 10. Click the Download Summary menu link to download a portable copy of your medical information. If you have questions, please visit the Frequently Asked Questions section of the [email or web address] website. Remember, [email or web address] is NOT to be used for urgent needs. For medical emergencies, dial 911. Now available from your iPhone and Android! Please provide this summary of care documentation to your next provider. Your primary care clinician is listed as Brian Bassett. If you have any questions after today's visit, please call [phone].

## 2018-08-10 LAB
ALBUMIN SERPL-MCNC: 4.6 G/DL (ref 3.5–5.5)
ALBUMIN/GLOB SERPL: 1.6 {RATIO} (ref 1.2–2.2)
ALP SERPL-CCNC: 81 IU/L (ref 39–117)
ALT SERPL-CCNC: 17 IU/L (ref 0–44)
AST SERPL-CCNC: 22 IU/L (ref 0–40)
BILIRUB SERPL-MCNC: 0.3 MG/DL (ref 0–1.2)
BUN SERPL-MCNC: 14 MG/DL (ref 6–24)
BUN/CREAT SERPL: 13 (ref 9–20)
CALCIUM SERPL-MCNC: 10 MG/DL (ref 8.7–10.2)
CHLORIDE SERPL-SCNC: 99 MMOL/L (ref 96–106)
CHOLEST SERPL-MCNC: 193 MG/DL (ref 100–199)
CO2 SERPL-SCNC: 23 MMOL/L (ref 20–29)
CREAT SERPL-MCNC: 1.06 MG/DL (ref 0.76–1.27)
GLOBULIN SER CALC-MCNC: 2.9 G/DL (ref 1.5–4.5)
GLUCOSE SERPL-MCNC: 158 MG/DL (ref 65–99)
HDLC SERPL-MCNC: 45 MG/DL
INTERPRETATION, 910389: NORMAL
LDLC SERPL CALC-MCNC: 104 MG/DL (ref 0–99)
POTASSIUM SERPL-SCNC: 3.9 MMOL/L (ref 3.5–5.2)
PROT SERPL-MCNC: 7.5 G/DL (ref 6–8.5)
SODIUM SERPL-SCNC: 140 MMOL/L (ref 134–144)
TRIGL SERPL-MCNC: 219 MG/DL (ref 0–149)
VLDLC SERPL CALC-MCNC: 44 MG/DL (ref 5–40)

## 2018-08-16 ENCOUNTER — HOSPITAL ENCOUNTER (OUTPATIENT)
Dept: DIABETES SERVICES | Age: 53
Discharge: HOME | End: 2018-08-16
Payer: COMMERCIAL

## 2018-08-16 PROCEDURE — G0108 DIAB MANAGE TRN  PER INDIV: HCPCS

## 2018-08-16 NOTE — DIABETES MGMT
08/16/18        Thank you for your kind referral. Your patient Ferdinand Bowen, attended Session #1 at [address] [place] [address] where the following topics were covered today . * Describing diabetes disease process and treatment options  * Incorporating nutrition management into their lifestyle  * Monitoring blood glucose and other parameters and interpreting and using the results       for self management decision making. * Preventing detecting and treating acute complications  * Incorporating physical activity into their lifestyle  * Using medications safely and for the maximum therapeutic effectiveness  * Developing personal strategies to promote health and behavior changes  * Developing personal strategies to address psychosocial issues and concerns    Data from first visit:  Weight: 8/16/2018 297.5 #  HgbA1c:  8/16/2018 7.8 %    Increased risk for diabetes: 5.7-6.4%, Diabetes >6.4%    Glycemic control for adults with diabetes:  < 7%         Elderly or multiple medical conditions  <8%    Random blood glucose: 8/16/2018 Pre-Lunch 139 mg/dl  Meter given: One Touch Verio Flex. Goal(s) set : will set at next visit. Pt participated in discussion related to the above DM topics using the conversation map format to guide our conversation. He reports taking care of his mother whom needs SBGM daily along w/ insulin injections. He completed return demonstration of One Touch Verio meter today with very minimal assistance and provided tips for technique and fill of strip. Mica Marti agrees to contact his insurer to assess coverage of meter provided today and to contact PCP for necessary RX. He reports using Herbalife shake diet (2 shakes and reasonable dinner) plan to lose wt and he reports losing 23 lb to date. He agrees that he was unable to sustain this practice over long periods.   As we used the plate method to initiate meal planning, he came to self realization that he is over portioning his foods and has not been aware of the amount that is a standard/appropriate portion. Jamie shared that neither he nor his wife do much meal preparation at home - dines out daily and needs help with providing guidelines within this parameter. Pt is currently inactive but agrees to walk at work. Plan:  1) begin testing BG at home and bring results to f/u.  2) begin using plate method and nutrition in fast neftaly to guide dining out portions. 3) encourage pt to consider using frozen entrees and adding fruit/vegetable as healthy option - provided brand suggestions that would be lower in sodium. Your patient will have two (2) additional appointments to complete the ordered education. Their next visit is scheduled for September 11th, 2018 at 1:30 pm.      We look forward to assisting your patient in meeting their self- management goals.  If you have any questions please do not hesitate to call the [address] at [phone]      Sincerely, Luana Chester, [address] [place] [address] , [place] [address] [address], [address] [facility]

## 2018-08-31 ENCOUNTER — TELEPHONE (OUTPATIENT)
Dept: FAMILY MEDICINE CLINIC | Age: 53
End: 2018-08-31

## 2018-08-31 DIAGNOSIS — M17.10 PRIMARY OSTEOARTHRITIS OF KNEE, UNSPECIFIED LATERALITY: Primary | ICD-10-CM

## 2018-08-31 RX ORDER — DICLOFENAC SODIUM 10 MG/G
GEL TOPICAL 4 TIMES DAILY
Qty: 100 G | Refills: 4 | Status: SHIPPED | OUTPATIENT
Start: 2018-08-31 | End: 2019-08-13

## 2018-08-31 NOTE — TELEPHONE ENCOUNTER
Pt stopped by requesting diclofenac(VOLTAREN) gel to be sent to his local [facility] pharmacy. He stated the pharmacist told him to try it and it may work better for him since the tablets are not working. Pt state he is requesting this gel ASAP because he is in a lot of pain. He can be reached at [phone].

## 2018-09-04 NOTE — TELEPHONE ENCOUNTER
Patient needs a new order for his testing strips (One Touch Verio) and his lancets (One Touch Delicate) sent to [facility] on [address]. Patient can be reached at [phone] with any questions on concerns.

## 2018-09-05 RX ORDER — LANCETS 33 GAUGE
EACH MISCELLANEOUS DAILY
Qty: 50 LANCET | Refills: 11 | Status: SHIPPED | OUTPATIENT
Start: 2018-09-05 | End: 2019-12-11 | Stop reason: SDUPTHER

## 2018-09-12 ENCOUNTER — TELEPHONE (OUTPATIENT)
Dept: FAMILY MEDICINE CLINIC | Age: 53
End: 2018-09-12

## 2018-09-12 DIAGNOSIS — I10 ESSENTIAL HYPERTENSION, BENIGN: Primary | ICD-10-CM

## 2018-09-12 RX ORDER — LOSARTAN POTASSIUM 100 MG/1
100 TABLET ORAL DAILY
Qty: 30 TAB | Refills: 11 | Status: SHIPPED | OUTPATIENT
Start: 2018-09-12 | End: 2019-09-12 | Stop reason: SDUPTHER

## 2018-09-12 NOTE — TELEPHONE ENCOUNTER
DiclofenacTopical Gel was denied, criteria covers this medication when he is unable to used oral NSAIDS

## 2018-09-25 ENCOUNTER — OFFICE VISIT (OUTPATIENT)
Dept: FAMILY MEDICINE CLINIC | Age: 53
End: 2018-09-25

## 2018-09-25 VITALS
RESPIRATION RATE: 20 BRPM | TEMPERATURE: 98.2 F | HEIGHT: 63 IN | DIASTOLIC BLOOD PRESSURE: 96 MMHG | OXYGEN SATURATION: 96 % | BODY MASS INDEX: 52.27 KG/M2 | SYSTOLIC BLOOD PRESSURE: 144 MMHG | HEART RATE: 88 BPM | WEIGHT: 295 LBS

## 2018-09-25 DIAGNOSIS — I10 ESSENTIAL HYPERTENSION, BENIGN: ICD-10-CM

## 2018-09-25 DIAGNOSIS — Z23 ENCOUNTER FOR IMMUNIZATION: ICD-10-CM

## 2018-09-25 DIAGNOSIS — M17.0 PRIMARY OSTEOARTHRITIS OF BOTH KNEES: ICD-10-CM

## 2018-09-25 DIAGNOSIS — R73.02 IGT (IMPAIRED GLUCOSE TOLERANCE): Primary | ICD-10-CM

## 2018-09-25 LAB — GLUCOSE POC: 149 MG/DL

## 2018-09-25 NOTE — MR AVS SNAPSHOT
[facility] 
 
 
 [address] [facility] [address] [id number] [phone] Patient: Joe Ybarra 
MRN: [id number] Visit Information Date & Time Provider Department Dept. Phone Encounter #  
 9/25/2018 11:00 AM Eric Dunn [phone] [id number] Follow-up Instructions Return in about 3 months (around 12/25/2018). Upcoming Health Maintenance Date Due Pneumococcal 19-64 Highest Risk (1 of 3 - PCV13) 9/20/1983 Shingrix Vaccine Age 50> (1 of 2) 9/20/2014 FOBT Q 1 YEAR AGE 50-75 9/20/2014 Influenza Age 5 to Adult 8/1/2018 DTaP/Tdap/Td series (2 - Td) 7/30/2026 Allergies as of 9/25/2018  Review Complete On: 9/25/2018 By: Yair Ruvalcaba Severity Noted Reaction Type Reactions Ramipril  09/12/2018    Cough Current Immunizations  Never Reviewed Name Date Tdap 7/30/2016 12:33 AM  
  
 Not reviewed this visit You Were Diagnosed With   
  
 Codes Comments IGT (impaired glucose tolerance)    -  Primary ICD-10-CM: R73.02 
ICD-9-CM: 790.22 Essential hypertension, benign     ICD-10-CM: I10 
ICD-9-CM: 401.1 Primary osteoarthritis of both knees     ICD-10-CM: M17.0 ICD-9-CM: 715.16 Vitals BP Pulse Temp Resp Height(growth percentile) Weight(growth percentile) (!) 144/96 (BP 1 Location: Left arm, BP Patient Position: Sitting) 88 98.2 °F (36.8 °C) (Oral) 20 5' 3\" (1.6 m) 295 lb (133.8 kg) SpO2 BMI Smoking Status 96% 52.26 kg/m2 Never Smoker Vitals History BMI and BSA Data Body Mass Index Body Surface Area  
 52.26 kg/m 2 2.44 m 2 Preferred Pharmacy Pharmacy Name Phone [facility]/PHARMACY [facility][id number]- [facility] [address] [phone] Your Updated Medication List  
  
   
This list is accurate as of 9/25/18 11:05 AM.  Always use your most recent med list.  
 butalbital-acetaminophen  mg tablet Commonly known as:  Henrico Ishihara Take 1 Tab by mouth every six (6) hours as needed. chlorthalidone 25 mg tablet Commonly known as:  HYGROTEN  
TAKE 1 TAB BY MOUTH DAILY. diclofenac 1 % Gel Commonly known as:  VOLTAREN Apply  to affected area four (4) times daily. dilTIAZem  mg ER capsule Commonly known as:  CARDIZEM CD  
TAKE ONE CAPSULE BY MOUTH EVERY DAY  
  
 glucose blood VI test strips strip Commonly known as:  Marguerita Smiles  
by Does Not Apply route daily. Check blood sugars once daily  
  
 lancets 33 gauge Misc Commonly known as: One Touch Delica  
by Does Not Apply route daily. Check blood sugars once daily  
  
 losartan 100 mg tablet Commonly known as:  COZAAR Take 1 Tab by mouth daily. naproxen 500 mg tablet Commonly known as:  NAPROSYN Take 1 Tab by mouth two (2) times daily (with meals) for 360 days. traZODone 50 mg tablet Commonly known as:  Tavarez Sergei Take 1 Tab by mouth nightly. We Performed the Following AMB POC GLUCOSE, QUANTITATIVE, BLOOD [45902 CPT(R)] Follow-up Instructions Return in about 3 months (around 12/25/2018). To-Do List   
 10/04/2018 11:00 AM  
  Appointment with LAUREL Stroud at [facility] ([phone]) Introducing [facility]! [employer] introduces [facility] patient portal. Now you can access parts of your medical record, email your doctor's office, and request medication refills online. 1. In your internet browser, go to [email or web address] 2. Click on the First Time User? Click Here link in the Sign In box. You will see the New Member Sign Up page. 3. Enter your [facility] Access Code exactly as it appears below. You will not need to use this code after youve completed the sign-up process. If you do not sign up before the expiration date, you must request a new code. · [employer] Access Code: [email or web address] Expires: 11/15/2018 12:00 PM 
 
4. Enter the last four digits of your Social Security Number (xxxx) and Date of Birth (mm/dd/yyyy) as indicated and click Submit. You will be taken to the next sign-up page. 5. Create a [employer] ID. This will be your [employer] login ID and cannot be changed, so think of one that is secure and easy to remember. 6. Create a [employer] password. You can change your password at any time. 7. Enter your Password Reset Question and Answer. This can be used at a later time if you forget your password. 8. Enter your e-mail address. You will receive e-mail notification when new information is available in [address] 9. Click Sign Up. You can now view and download portions of your medical record. 10. Click the Download Summary menu link to download a portable copy of your medical information. If you have questions, please visit the Frequently Asked Questions section of the [employer] website. Remember, [employer] is NOT to be used for urgent needs. For medical emergencies, dial 911. Now available from your iPhone and Android! Please provide this summary of care documentation to your next provider. Your primary care clinician is listed as Florencio Merrill. If you have any questions after today's visit, please call [phone].

## 2018-09-25 NOTE — PROGRESS NOTES
Chief Complaint Patient presents with  Hypertension F/U on BP.  Cholesterol Problem F/U on cholesterol.  Immunization/Injection Pt getting flu shot.

## 2018-09-26 NOTE — PROGRESS NOTES
HISTORY OF PRESENT ILLNESS Wilver Sung is a 46 y.o. male. f/u hbp dm. Doing well,tolerating meds ok Hypertension The history is provided by the patient. This is a chronic problem. The problem has been rapidly improving. Pertinent negatives include no malaise/fatigue, no peripheral edema and no dizziness. Immunization/Injection This is a new problem. The problem occurs daily. The problem has not changed since onset. Blood sugar problem The history is provided by the patient. This is a new problem. The problem occurs daily. The problem has been rapidly improving. Review of Systems Constitutional: Negative for fever and malaise/fatigue. Genitourinary: Negative for frequency and urgency. Neurological: Negative for dizziness. Physical Exam  
Constitutional: He appears well-developed and well-nourished. HENT:  
Head: Normocephalic and atraumatic. Right Ear: External ear normal.  
Left Ear: External ear normal.  
Nose: Nose normal.  
Mouth/Throat: Oropharynx is clear and moist.  
Cardiovascular: Normal rate and regular rhythm. Pulmonary/Chest: Effort normal and breath sounds normal.  
Abdominal: Soft. Bowel sounds are normal.  
Skin: Skin is warm and dry. ASSESSMENT and PLAN Diagnoses and all orders for this visit: 
 
1. IGT (impaired glucose tolerance) -     AMB POC GLUCOSE, QUANTITATIVE, BLOOD 2. Essential hypertension, benign 3. Primary osteoarthritis of both knees 4. Encounter for immunization -     Influenza virus vaccine (QUADRIVALENT PRES FREE SYRINGE) IM (54060) -     CT IMMUNIZ ADMIN,1 SINGLE/COMB VAC/TOXOID Doing well,continue current meds and treatments Follow-up Disposition: 
Return in about 3 months (around 12/25/2018).

## 2018-10-17 ENCOUNTER — OFFICE VISIT (OUTPATIENT)
Dept: FAMILY MEDICINE CLINIC | Age: 53
End: 2018-10-17

## 2018-10-17 VITALS
HEIGHT: 63 IN | BODY MASS INDEX: 53.09 KG/M2 | SYSTOLIC BLOOD PRESSURE: 140 MMHG | DIASTOLIC BLOOD PRESSURE: 90 MMHG | OXYGEN SATURATION: 96 % | TEMPERATURE: 98 F | WEIGHT: 299.6 LBS | HEART RATE: 90 BPM | RESPIRATION RATE: 22 BRPM

## 2018-10-17 DIAGNOSIS — M75.102 ROTATOR CUFF SYNDROME OF LEFT SHOULDER: Primary | ICD-10-CM

## 2018-10-17 DIAGNOSIS — I10 ESSENTIAL HYPERTENSION, BENIGN: ICD-10-CM

## 2018-10-17 NOTE — PROGRESS NOTES
[address] PROCEDURE PROGRESS NOTE Chart reviewed for the following: 
 Alison Carrillo, have reviewed the History, Physical and updated the Allergic reactions for [address] performed immediately prior to start of procedure: 
 Alison Carrillo, have performed the following reviews on [facility] prior to the start of the procedure: 
         
* Patient was identified by name and date of birth * Agreement on procedure being performed was verified * Risks and Benefits explained to the patient * Procedure site verified and marked as necessary * Patient was positioned for comfort * Consent was signed and verified Time: 11:00am 
 
 
Date of procedure: 10/17/2018 Procedure performed by: Raven Newton MD 
 
Provider assisted by: N/A Patient assisted by: N/A How tolerated by patient: Well Post Procedural Pain Scale: 4/10 Comments: Pt received Left shoulder joint injection and tolerated it well; Pt pain before procedure was 6/10 and after 4/10.

## 2018-10-17 NOTE — PROGRESS NOTES
HISTORY OF PRESENT ILLNESS Janet Caal is a 46 y.o. male. recurrent l shoulder pain,pmh rc disordr 
Shoulder Pain The history is provided by the patient. The incident occurred more than 2 days ago. There was no injury mechanism. The pain is at a severity of 5/10. The pain is moderate. The pain has been intermittent since onset. He has no other injuries. Associated symptoms include muscle weakness. Review of Systems Musculoskeletal: Positive for joint pain. Negative for myalgias and neck pain. Skin: Negative for rash. Physical Exam  
Constitutional: He appears well-developed and well-nourished. Cardiovascular: Normal rate and regular rhythm. Musculoskeletal:  
     Left shoulder: He exhibits decreased range of motion, tenderness and decreased strength. He exhibits no bony tenderness and no crepitus. Rotator Cuff maneuvers were suggestive of RC Disorder Diagnoses and all orders for this visit: 
 
Rotator cuff syndrome of left shoulder Essential hypertension, benign Follow-up Disposition: Not on File Indications:  
Rotator Cuff Disorder,L 
 
Procedure: After consent was obtained,and procedure risks and benefits reviewed,using sterile technique the Left Shoulder Joint   was prepped using Betadine. The joint was entered usinga 23 ga needle and 40 mg of Depo-Medrol and 1 ml Marcaine were injected without difficulty ,and the needle was withdrawn. The procedure was well tolerated,with negligile discomfort postprocedure. The patient was asked to continue to rest the joint fora few days before resuming normal activities. They were advised that there may be increased pain for the next 1-2 days,and to watch for fever,redness,or increased swelling or pain. They were advised to call if such symptoms develop. Joint Injection instruction sheet was given to the patient and reviewed. The patient departed in good condition

## 2018-11-02 ENCOUNTER — HOSPITAL ENCOUNTER (OUTPATIENT)
Dept: CT IMAGING | Age: 53
Discharge: HOME | End: 2018-11-02
Attending: OTOLARYNGOLOGY
Payer: COMMERCIAL

## 2018-11-02 DIAGNOSIS — R05.9 COUGH: ICD-10-CM

## 2018-11-02 DIAGNOSIS — J38.4: ICD-10-CM

## 2018-11-02 DIAGNOSIS — J32.9 CHRONIC SINUSITIS: ICD-10-CM

## 2018-11-02 PROCEDURE — 70486 CT MAXILLOFACIAL W/O DYE: CPT

## 2018-11-05 DIAGNOSIS — I10 ESSENTIAL HYPERTENSION, BENIGN: ICD-10-CM

## 2018-11-05 RX ORDER — CHLORTHALIDONE 25 MG/1
TABLET ORAL
Qty: 90 TAB | Refills: 3 | Status: SHIPPED | OUTPATIENT
Start: 2018-11-05 | End: 2020-01-06

## 2018-11-05 NOTE — TELEPHONE ENCOUNTER
[facility] Requests A 90 day supply on the patients behalf. Last Visit: 10/17  Next Appt: n one                                Previous Refill Encounter:  6/28-30+10    Requested Prescriptions     Pending Prescriptions Disp Refills    chlorthalidone (HYGROTEN) 25 mg tablet 90 Tab 3     Sig: TAKE 1 TAB BY MOUTH DAILY.

## 2018-11-08 DIAGNOSIS — M25.562 CHRONIC PAIN OF LEFT KNEE: ICD-10-CM

## 2018-11-08 DIAGNOSIS — G89.29 CHRONIC PAIN OF LEFT KNEE: ICD-10-CM

## 2018-11-08 RX ORDER — NAPROXEN 500 MG/1
500 TABLET ORAL 2 TIMES DAILY WITH MEALS
Qty: 180 TAB | Refills: 0 | Status: SHIPPED | OUTPATIENT
Start: 2018-11-08 | End: 2019-02-21 | Stop reason: SDUPTHER

## 2018-11-08 NOTE — TELEPHONE ENCOUNTER
Last Visit: 10/17  Next Appt: none  Previous Refill Encounter: 2/19-60+11    Requested Prescriptions     Pending Prescriptions Disp Refills    naproxen (NAPROSYN) 500 mg tablet 180 Tab 3     Sig: Take 1 Tab by mouth two (2) times daily (with meals) for 360 days.

## 2018-11-26 ENCOUNTER — OFFICE VISIT (OUTPATIENT)
Dept: FAMILY MEDICINE CLINIC | Age: 53
End: 2018-11-26

## 2018-11-26 VITALS
WEIGHT: 299 LBS | HEIGHT: 63 IN | RESPIRATION RATE: 22 BRPM | SYSTOLIC BLOOD PRESSURE: 146 MMHG | HEART RATE: 87 BPM | DIASTOLIC BLOOD PRESSURE: 88 MMHG | TEMPERATURE: 98 F | OXYGEN SATURATION: 98 % | BODY MASS INDEX: 52.98 KG/M2

## 2018-11-26 DIAGNOSIS — I10 ESSENTIAL HYPERTENSION, BENIGN: ICD-10-CM

## 2018-11-26 DIAGNOSIS — M79.605 BILATERAL LEG PAIN: Primary | ICD-10-CM

## 2018-11-26 DIAGNOSIS — M79.604 BILATERAL LEG PAIN: Primary | ICD-10-CM

## 2018-11-26 DIAGNOSIS — R73.02 IGT (IMPAIRED GLUCOSE TOLERANCE): ICD-10-CM

## 2018-11-26 DIAGNOSIS — E78.2 MIXED HYPERLIPIDEMIA: ICD-10-CM

## 2018-11-26 LAB — HBA1C MFR BLD HPLC: 8.1 %

## 2018-11-26 RX ORDER — SULINDAC 150 MG/1
150 TABLET ORAL 2 TIMES DAILY
Qty: 30 TAB | Refills: 2 | Status: SHIPPED | OUTPATIENT
Start: 2018-11-26 | End: 2018-12-18 | Stop reason: SDUPTHER

## 2018-11-26 NOTE — PROGRESS NOTES
Chief Complaint Patient presents with  Numbness Pt state he has numbness, tingling and throbbing in danae legs.

## 2018-11-26 NOTE — PROGRESS NOTES
HISTORY OF PRESENT ILLNESS Malinda Goldmann is a 46 y.o. male. bilateral lower leg numbness and discomfort at rest.No back pain,cramps Numbness The history is provided by the patient. This is a new problem. The current episode started more than 1 week ago. The problem has not changed since onset. Leg Pain The history is provided by the patient. This is a new problem. The current episode started more than 1 week ago. The problem occurs daily. The pain is present in the right lower leg, left lower leg, left foot and right foot. The pain is at a severity of 3/10. Associated symptoms include numbness and tingling. Pertinent negatives include no back pain and no neck pain. Review of Systems Constitutional: Negative for diaphoresis and fever. Genitourinary: Negative for dysuria and urgency. Musculoskeletal: Negative for back pain and neck pain. Neurological: Positive for tingling and numbness. Physical Exam  
Constitutional: He is oriented to person, place, and time. He appears well-developed and well-nourished. HENT:  
Head: Normocephalic and atraumatic. Right Ear: External ear normal.  
Left Ear: External ear normal.  
Nose: Nose normal.  
Mouth/Throat: Oropharynx is clear and moist.  
Cardiovascular: Normal rate and regular rhythm. Pulmonary/Chest: Effort normal and breath sounds normal.  
Abdominal: Soft. Bowel sounds are normal.  
Musculoskeletal:  
     Right knee: Normal.  
     Left knee: Normal.  
     Lumbar back: Normal.  
Neurological: He is alert and oriented to person, place, and time. He has normal reflexes. He displays normal reflexes. Coordination normal.  
Skin: Skin is warm and dry. Psychiatric: He has a normal mood and affect. ASSESSMENT and PLAN Diagnoses and all orders for this visit: 
 
1. Bilateral leg pain -     AMB POC COMPLETE CBC, AUTOMATED 
-     MAGNESIUM 
-     sulindac (CLINORIL) 150 mg tablet; Take 1 Tab by mouth two (2) times a day. 2. Essential hypertension, benign -     METABOLIC PANEL, COMPREHENSIVE 
-     CBC WITH AUTOMATED DIFF 
 
3. IGT (impaired glucose tolerance) -     AMB POC HEMOGLOBIN A1C 
 
4. Mixed hyperlipidemia 
-     CHOLESTEROL, TOTAL Follow-up Disposition: 
Return in about 3 months (around 2/26/2019).

## 2018-11-27 LAB
ALBUMIN SERPL-MCNC: 4.1 G/DL (ref 3.5–5.5)
ALBUMIN/GLOB SERPL: 1.6 {RATIO} (ref 1.2–2.2)
ALP SERPL-CCNC: 82 IU/L (ref 39–117)
ALT SERPL-CCNC: 20 IU/L (ref 0–44)
AST SERPL-CCNC: 23 IU/L (ref 0–40)
BASOPHILS # BLD AUTO: 0 X10E3/UL (ref 0–0.2)
BASOPHILS NFR BLD AUTO: 0 %
BILIRUB SERPL-MCNC: 0.2 MG/DL (ref 0–1.2)
BUN SERPL-MCNC: 11 MG/DL (ref 6–24)
BUN/CREAT SERPL: 13 (ref 9–20)
CALCIUM SERPL-MCNC: 9.4 MG/DL (ref 8.7–10.2)
CHLORIDE SERPL-SCNC: 101 MMOL/L (ref 96–106)
CHOLEST SERPL-MCNC: 196 MG/DL (ref 100–199)
CO2 SERPL-SCNC: 30 MMOL/L (ref 20–29)
CREAT SERPL-MCNC: 0.82 MG/DL (ref 0.76–1.27)
EOSINOPHIL # BLD AUTO: 0.1 X10E3/UL (ref 0–0.4)
EOSINOPHIL NFR BLD AUTO: 1 %
ERYTHROCYTE [DISTWIDTH] IN BLOOD BY AUTOMATED COUNT: 15.7 % (ref 12.3–15.4)
GLOBULIN SER CALC-MCNC: 2.6 G/DL (ref 1.5–4.5)
GLUCOSE SERPL-MCNC: 221 MG/DL (ref 65–99)
HCT VFR BLD AUTO: 36.5 % (ref 37.5–51)
HGB BLD-MCNC: 12.8 G/DL (ref 13–17.7)
IMM GRANULOCYTES # BLD: 0.1 X10E3/UL (ref 0–0.1)
IMM GRANULOCYTES NFR BLD: 1 %
LYMPHOCYTES # BLD AUTO: 2.3 X10E3/UL (ref 0.7–3.1)
LYMPHOCYTES NFR BLD AUTO: 28 %
MAGNESIUM SERPL-MCNC: 2.2 MG/DL (ref 1.6–2.3)
MCH RBC QN AUTO: 28.7 PG (ref 26.6–33)
MCHC RBC AUTO-ENTMCNC: 35.1 G/DL (ref 31.5–35.7)
MCV RBC AUTO: 82 FL (ref 79–97)
MONOCYTES # BLD AUTO: 0.8 X10E3/UL (ref 0.1–0.9)
MONOCYTES NFR BLD AUTO: 10 %
NEUTROPHILS # BLD AUTO: 5 X10E3/UL (ref 1.4–7)
NEUTROPHILS NFR BLD AUTO: 60 %
PLATELET # BLD AUTO: 317 X10E3/UL (ref 150–379)
POTASSIUM SERPL-SCNC: 3.8 MMOL/L (ref 3.5–5.2)
PROT SERPL-MCNC: 6.7 G/DL (ref 6–8.5)
RBC # BLD AUTO: 4.46 X10E6/UL (ref 4.14–5.8)
SODIUM SERPL-SCNC: 143 MMOL/L (ref 134–144)
WBC # BLD AUTO: 8.2 X10E3/UL (ref 3.4–10.8)

## 2018-12-14 ENCOUNTER — OFFICE VISIT (OUTPATIENT)
Dept: FAMILY MEDICINE CLINIC | Age: 53
End: 2018-12-14

## 2018-12-14 VITALS
RESPIRATION RATE: 20 BRPM | SYSTOLIC BLOOD PRESSURE: 166 MMHG | BODY MASS INDEX: 54.68 KG/M2 | WEIGHT: 308.6 LBS | OXYGEN SATURATION: 95 % | DIASTOLIC BLOOD PRESSURE: 106 MMHG | TEMPERATURE: 98.1 F | HEIGHT: 63 IN | HEART RATE: 76 BPM

## 2018-12-14 DIAGNOSIS — I10 ESSENTIAL HYPERTENSION, BENIGN: ICD-10-CM

## 2018-12-14 DIAGNOSIS — R73.02 IGT (IMPAIRED GLUCOSE TOLERANCE): ICD-10-CM

## 2018-12-14 DIAGNOSIS — R20.0 NUMBNESS AND TINGLING OF BOTH FEET: Primary | ICD-10-CM

## 2018-12-14 DIAGNOSIS — R20.2 NUMBNESS AND TINGLING OF BOTH FEET: Primary | ICD-10-CM

## 2018-12-14 RX ORDER — METFORMIN HYDROCHLORIDE 500 MG/1
500 TABLET, EXTENDED RELEASE ORAL
Qty: 30 TAB | Refills: 1 | Status: SHIPPED | OUTPATIENT
Start: 2018-12-14 | End: 2019-01-07 | Stop reason: SDUPTHER

## 2018-12-14 NOTE — LETTER
NOTIFICATION OF RETURN TO WORK / SCHOOL 
 
12/14/2018 3:26 PM 
 
Mr. Jenni Vill[address]omez [address] D[phone] Tonio Angela To Whom It May Concern: 
 
Cedric Baird III was under the care of [facility] from 12/14/18. He will be able to return to work/school on 12/14/18 with no restrictions. If there are questions or concerns please have the patient contact our office. Sincerely, Ranjeet Lama MD

## 2018-12-15 NOTE — PROGRESS NOTES
HISTORY OF PRESENT ILLNESS  Lusi January III is a 46 y.o. male. bilateral lower leg numbness and discomfort at rest and with activity. No back pain. Has IGT,HBP  Numbness   The history is provided by the patient. This is a new problem. The current episode started more than 1 week ago. The problem has not changed since onset. Primary symptoms include loss of sensation. Pertinent negatives include no focal weakness. Leg Pain    The history is provided by the patient. This is a new problem. The current episode started more than 1 week ago. The problem occurs daily. The pain is present in the right lower leg, left lower leg, left foot and right foot. The pain is at a severity of 3/10. Associated symptoms include numbness and tingling. Pertinent negatives include no back pain and no neck pain. Review of Systems   Constitutional: Negative for diaphoresis and fever. Genitourinary: Negative for dysuria and urgency. Musculoskeletal: Negative for back pain and neck pain. Neurological: Positive for tingling and numbness. Negative for sensory change and focal weakness. Physical Exam   Constitutional: He is oriented to person, place, and time. He appears well-developed and well-nourished. HENT:   Head: Normocephalic and atraumatic. Right Ear: External ear normal.   Left Ear: External ear normal.   Nose: Nose normal.   Mouth/Throat: Oropharynx is clear and moist.   Cardiovascular: Normal rate and regular rhythm. Absent pedal pulses   Pulmonary/Chest: Effort normal and breath sounds normal.   Abdominal: Soft. Bowel sounds are normal.   Musculoskeletal:        Right knee: Normal.        Left knee: Normal.        Lumbar back: Normal.   Neurological: He is alert and oriented to person, place, and time. He has normal reflexes. Coordination normal.   Skin: Skin is warm and dry. Psychiatric: He has a normal mood and affect. ASSESSMENT and PLAN  Diagnoses and all orders for this visit:    1.  Numbness and tingling of both feet  -     DUPLEX LOWER EXT ARTERY BILAT; Future    2. IGT (impaired glucose tolerance)  -     metFORMIN ER (GLUCOPHAGE XR) 500 mg tablet; Take 1 Tab by mouth daily (with dinner). 3. Essential hypertension, benign      Follow-up Disposition:  Return in about 2 weeks (around 12/28/2018).

## 2018-12-18 DIAGNOSIS — M79.605 BILATERAL LEG PAIN: ICD-10-CM

## 2018-12-18 DIAGNOSIS — M79.604 BILATERAL LEG PAIN: ICD-10-CM

## 2018-12-18 NOTE — TELEPHONE ENCOUNTER
[facility] Requests A 90 day supply on the patients behalf. Last Visit: 12/14  Next Appt: 12/24  Previous Refill Encounter: 11/26-30+2    Requested Prescriptions     Pending Prescriptions Disp Refills    sulindac (CLINORIL) 150 mg tablet 180 Tab 0     Sig: Take 1 Tab by mouth two (2) times a day.

## 2018-12-19 ENCOUNTER — HOSPITAL ENCOUNTER (OUTPATIENT)
Dept: VASCULAR SURGERY | Age: 53
Discharge: HOME | End: 2018-12-19
Attending: FAMILY MEDICINE
Payer: COMMERCIAL

## 2018-12-19 DIAGNOSIS — R20.0 NUMBNESS AND TINGLING OF BOTH FEET: ICD-10-CM

## 2018-12-19 DIAGNOSIS — R20.2 NUMBNESS AND TINGLING OF BOTH FEET: ICD-10-CM

## 2018-12-19 PROCEDURE — 93922 UPR/L XTREMITY ART 2 LEVELS: CPT

## 2018-12-19 RX ORDER — SULINDAC 150 MG/1
150 TABLET ORAL 2 TIMES DAILY
Qty: 180 TAB | Refills: 0 | Status: SHIPPED | OUTPATIENT
Start: 2018-12-19 | End: 2019-03-18 | Stop reason: SDUPTHER

## 2018-12-19 NOTE — PROCEDURES
[facility]  *** FINAL REPORT ***    Name: Cora Watts  MRN: [id number]    Outpatient  : 20 Sep 1965  HIS Order #: [id number]  [address] [facility] Visit #: [id number]  Date: 19 Dec 2018    TYPE OF TEST: Peripheral Arterial Testing    REASON FOR TEST  Numbness and tingling in both feet    Right Leg  PVR:        Normal  Ankle/Brachial: 1.28    Left Leg  PVR:        Normal  Ankle/Brachial: 1.22    INTERPRETATION/FINDINGS  PROCEDURE:  Ankle, brachial and digital arterial pressures, PVR  waveforms and digital PPG waveforms were performed. 1. No evidence of significant peripheral arterial disease at rest in  the right leg. 2. No evidence of significant peripheral arterial disease at rest in  the left leg. 3. Pulse Volume Recording (PVR) waveforms are normal bilaterally. 4. The right ankle/brachial index is 1.28 and the left ankle/brachial  index is 1.22.  5. The right great toe/brachial index is 1.04 and the left great  toe/brachial index is 1.09.    ADDITIONAL COMMENTS    I have personally reviewed the data relevant to the interpretation of  this  study.     TECHNOLOGIST: Luisito Valerio RVT  Signed: 2018 11:44 AM    PHYSICIAN: Socrates Leyva MD  Signed: 2018 04:44 PM

## 2018-12-24 ENCOUNTER — OFFICE VISIT (OUTPATIENT)
Dept: FAMILY MEDICINE CLINIC | Age: 53
End: 2018-12-24

## 2018-12-24 VITALS
WEIGHT: 300.6 LBS | BODY MASS INDEX: 53.26 KG/M2 | HEART RATE: 84 BPM | RESPIRATION RATE: 16 BRPM | HEIGHT: 63 IN | OXYGEN SATURATION: 97 % | DIASTOLIC BLOOD PRESSURE: 77 MMHG | SYSTOLIC BLOOD PRESSURE: 135 MMHG | TEMPERATURE: 98.2 F

## 2018-12-24 DIAGNOSIS — M54.42 CHRONIC BILATERAL LOW BACK PAIN WITH BILATERAL SCIATICA: Primary | ICD-10-CM

## 2018-12-24 DIAGNOSIS — M75.102 ROTATOR CUFF SYNDROME OF LEFT SHOULDER: ICD-10-CM

## 2018-12-24 DIAGNOSIS — L25.9 CONTACT DERMATITIS, UNSPECIFIED CONTACT DERMATITIS TYPE, UNSPECIFIED TRIGGER: ICD-10-CM

## 2018-12-24 DIAGNOSIS — G89.29 CHRONIC BILATERAL LOW BACK PAIN WITH BILATERAL SCIATICA: Primary | ICD-10-CM

## 2018-12-24 DIAGNOSIS — M54.41 CHRONIC BILATERAL LOW BACK PAIN WITH BILATERAL SCIATICA: Primary | ICD-10-CM

## 2018-12-24 RX ORDER — AZITHROMYCIN 250 MG/1
TABLET, FILM COATED ORAL
Refills: 1 | COMMUNITY
Start: 2018-10-25 | End: 2018-12-24 | Stop reason: ALTCHOICE

## 2018-12-24 RX ORDER — TRIAMCINOLONE ACETONIDE 1 MG/G
OINTMENT TOPICAL 2 TIMES DAILY
Qty: 85 G | Refills: 0 | Status: SHIPPED | OUTPATIENT
Start: 2018-12-24 | End: 2020-11-06

## 2018-12-24 RX ORDER — CLARITHROMYCIN 500 MG/1
TABLET, FILM COATED ORAL
Refills: 1 | COMMUNITY
Start: 2018-10-29 | End: 2018-12-24 | Stop reason: ALTCHOICE

## 2018-12-24 RX ORDER — PREDNISONE 10 MG/1
TABLET ORAL
Refills: 0 | COMMUNITY
Start: 2018-10-29 | End: 2018-12-24 | Stop reason: ALTCHOICE

## 2018-12-24 NOTE — PROGRESS NOTES
[address]  OFFICE PROCEDURE PROGRESS NOTE        Chart reviewed for the following:   Velasquez Moran LPN, have reviewed the History, Physical and updated the Allergic reactions for [address] performed immediately prior to start of procedure:   Velasquez Moran LPN, have performed the following reviews on [facility] prior to the start of the procedure:            * Patient was identified by name and date of birth   * Agreement on procedure being performed was verified  * Risks and Benefits explained to the patient  * Procedure site verified and marked as necessary  * Patient was positioned for comfort  * Consent was signed and verified     Time: 12:50pm      Date of procedure: 12/24/2018    Procedure performed by: Akhil Goins MD    Provider assisted by: none    Patient assisted by: none    How tolerated by patient: well    Post Procedural Pain Scale: 4    Comments: Patient is here for a left shoulder joint injection. Pain is rated 5/10 prior to the procedure.

## 2018-12-24 NOTE — PROGRESS NOTES
HISTORY OF PRESENT ILLNESS  Lois Sheehan III is a 46 y.o. male. recurrence of chronic l shoulder pain. F/U HBP,l leg discomfort  Hypertension    The history is provided by the patient. This is a chronic problem. The problem has been gradually improving. Pertinent negatives include no chest pain, no malaise/fatigue, no headaches, no peripheral edema and no dizziness. Shoulder Pain    The history is provided by the patient. The incident occurred more than 1 week ago. There was no injury mechanism. The left shoulder is affected. The pain is at a severity of 5/10. The pain is moderate. The pain has been fluctuating since onset. Associated symptoms include numbness and muscle weakness. Numbness   The current episode started more than 1 week ago. The problem has been gradually improving. There was right lower extremity and left lower extremity focality noted. Pertinent negatives include no chest pain, no altered mental status and no headaches. Review of Systems   Constitutional: Negative for fever and malaise/fatigue. Cardiovascular: Negative for chest pain. Genitourinary: Negative for frequency. Musculoskeletal: Positive for joint pain. Skin: Negative for rash. Neurological: Positive for numbness. Negative for dizziness, sensory change and headaches. Physical Exam   Constitutional: He is oriented to person, place, and time. He appears well-developed and well-nourished. HENT:   Head: Normocephalic and atraumatic. Right Ear: External ear normal.   Left Ear: External ear normal.   Nose: Nose normal.   Mouth/Throat: Oropharynx is clear and moist.   Eyes: Conjunctivae are normal. Pupils are equal, round, and reactive to light. Neck: Normal range of motion. Neck supple. Cardiovascular: Normal rate and regular rhythm. Pulmonary/Chest: Effort normal and breath sounds normal.   Abdominal: Soft.  Bowel sounds are normal.   Musculoskeletal:        Left shoulder: He exhibits decreased range of motion, tenderness, bony tenderness and crepitus. He exhibits no deformity and normal strength. Right knee: He exhibits normal range of motion, no swelling and no effusion. No tenderness found. No medial joint line tenderness noted. Arms:  Tender l ac joint   Neurological: He is alert and oriented to person, place, and time. Skin: Skin is warm and dry. ASSESSMENT and PLAN  Diagnoses and all orders for this visit:    1. Chronic bilateral low back pain with bilateral sciatica    2. Contact dermatitis, unspecified contact dermatitis type, unspecified trigger  -     triamcinolone acetonide (KENALOG) 0.1 % ointment; Apply  to affected area two (2) times a day. use thin layer    3. Rotator cuff syndrome of left shoulder      Follow-up Disposition: Not on File      Indications:   Left Rotator Cuff Tendonitis    Procedure:  After consent was obtained,and procedure risks and benefits reviewed,using sterile technique the    was prepped using Betadine. The  Left Shoulder joint was entered usinga 23 ga needle and 40 mg of Depo-Medrol and 1 ml 1% Lidocaine were injected without difficulty ,and the needle was withdrawn. The procedure was well tolerated,with negligile discomfort postprocedure. The patient was asked to continue to rest the joint fora few days before resuming normal activities. They were advised that there may be increased pain for the next 1-2 days,and to watch for fever,redness,or increased swelling or pain. They were advised to call if such symptoms develop. Joint Injection instruction sheet was given to the patient and reviewed. The patient departed in good condition  .

## 2018-12-24 NOTE — PROGRESS NOTES
Chief Complaint   Patient presents with    Hypertension     Patient here for two week follow on blood pressure and circulation on left lower leg. No ER visits. Seen by physician for for circulation last Friday.

## 2018-12-24 NOTE — PATIENT INSTRUCTIONS
Back Stretches: Exercises  Your Care Instructions  Here are some examples of exercises for stretching your back. Start each exercise slowly. Ease off the exercise if you start to have pain. Your doctor or physical therapist will tell you when you can start these exercises and which ones will work best for you. How to do the exercises  Overhead stretch    1. Stand comfortably with your feet shoulder-width apart. 2. Looking straight ahead, raise both arms over your head and reach toward the ceiling. Do not allow your head to tilt back. 3. Hold for 15 to 30 seconds, then lower your arms to your sides. 4. Repeat 2 to 4 times. Side stretch    1. Stand comfortably with your feet shoulder-width apart. 2. Raise one arm over your head, and then lean to the other side. 3. Slide your hand down your leg as you let the weight of your arm gently stretch your side muscles. Hold for 15 to 30 seconds. 4. Repeat 2 to 4 times on each side. Press-up    1. Lie on your stomach, supporting your body with your forearms. 2. Press your elbows down into the floor to raise your upper back. As you do this, relax your stomach muscles and allow your back to arch without using your back muscles. As your press up, do not let your hips or pelvis come off the floor. 3. Hold for 15 to 30 seconds, then relax. 4. Repeat 2 to 4 times. Relax and rest    1. Lie on your back with a rolled towel under your neck and a pillow under your knees. Extend your arms comfortably to your sides. 2. Relax and breathe normally. 3. Remain in this position for about 10 minutes. 4. If you can, do this 2 or 3 times each day. Follow-up care is a key part of your treatment and safety. Be sure to make and go to all appointments, and call your doctor if you are having problems. It's also a good idea to know your test results and keep a list of the medicines you take. Where can you learn more?   Go to http://elijah-reagan.info/. Enter D686 in the search box to learn more about \"Back Stretches: Exercises. \"  Current as of: November 29, 2017  Content Version: 11.8  © 3979-8333 [employer]. Care instructions adapted under license by [employer] (which disclaims liability or warranty for this information). If you have questions about a medical condition or this instruction, always ask your healthcare professional. [address] any warranty or liability for your use of this information. Joint Injections: Care Instructions  Your Care Instructions    Joint injections are shots into a joint, such as the knee. They may be used to put in medicines, such as pain relievers. A corticosteroid, or steroid, shot is used to reduce inflammation in tendons or joints. It is often used to treat problems such as arthritis, tendinitis, and bursitis. Steroids can be injected directly into a painful, inflamed joint. They can also help reduce inflammation of a bursa. A bursa is a sac of fluid. It cushions and lubricates areas where tendons, ligaments, skin, muscles, or bones rub against each other. A steroid shot can sometimes help with short-term pain relief when other treatments haven't worked. If steroid shots help, pain may improve for weeks or months. Follow-up care is a key part of your treatment and safety. Be sure to make and go to all appointments, and call your doctor if you are having problems. It's also a good idea to know your test results and keep a list of the medicines you take. How can you care for yourself at home? · Put ice or a cold pack on the area for 10 to 20 minutes at a time. Put a thin cloth between the ice and your skin. · Ask your doctor if you can take an over-the-counter pain medicine, such as acetaminophen (Tylenol), ibuprofen (Advil, Motrin), or naproxen (Aleve). Be safe with medicines.  Read and follow all instructions on the label.  · Avoid strenuous activities for several days. In particular, avoid ones that put stress on the area where you got the shot. · If you have dressings over the area, keep them clean and dry. You may remove them when your doctor tells you to. When should you call for help? Call your doctor now or seek immediate medical care if:    · You have signs of infection, such as:  ? Increased pain, swelling, warmth, or redness. ? Red streaks leading from the site. ? Pus draining from the site. ? A fever.    Watch closely for changes in your health, and be sure to contact your doctor if you have any problems. Where can you learn more? Go to http://elijah-reagan.info/. Enter N616 in the search box to learn more about \"Joint Injections: Care Instructions. \"  Current as of: November 29, 2017  Content Version: 11.8  © 3809-9495 [employer]. Care instructions adapted under license by [employer] (which disclaims liability or warranty for this information). If you have questions about a medical condition or this instruction, always ask your healthcare professional. [address] any warranty or liability for your use of this information.

## 2019-01-07 DIAGNOSIS — S93.409A SPRAIN OF ANKLE, UNSPECIFIED LATERALITY, UNSPECIFIED LIGAMENT, INITIAL ENCOUNTER: Primary | ICD-10-CM

## 2019-01-07 DIAGNOSIS — R73.02 IGT (IMPAIRED GLUCOSE TOLERANCE): ICD-10-CM

## 2019-01-07 RX ORDER — METFORMIN HYDROCHLORIDE 500 MG/1
500 TABLET, EXTENDED RELEASE ORAL
Qty: 90 TAB | Refills: 1 | Status: SHIPPED | OUTPATIENT
Start: 2019-01-07 | End: 2019-05-22 | Stop reason: SDUPTHER

## 2019-01-07 RX ORDER — NAPROXEN 500 MG/1
500 TABLET ORAL 2 TIMES DAILY WITH MEALS
Qty: 30 TAB | Refills: 1 | Status: SHIPPED | OUTPATIENT
Start: 2019-01-07 | End: 2019-07-15 | Stop reason: SDUPTHER

## 2019-01-07 NOTE — TELEPHONE ENCOUNTER
Last Visit: 12/24  Next Appt: 1/28  Previous Refill Encounter: 12/14-30+1    Requested Prescriptions     Pending Prescriptions Disp Refills    metFORMIN ER (GLUCOPHAGE XR) 500 mg tablet 90 Tab 1     Sig: Take 1 Tab by mouth daily (with dinner).

## 2019-01-30 ENCOUNTER — HOSPITAL ENCOUNTER (OUTPATIENT)
Dept: GENERAL RADIOLOGY | Age: 54
Discharge: HOME | End: 2019-01-30
Payer: COMMERCIAL

## 2019-01-30 DIAGNOSIS — R05.9 COUGH: ICD-10-CM

## 2019-01-30 PROCEDURE — 71046 X-RAY EXAM CHEST 2 VIEWS: CPT

## 2019-02-21 DIAGNOSIS — M25.562 CHRONIC PAIN OF LEFT KNEE: ICD-10-CM

## 2019-02-21 DIAGNOSIS — G89.29 CHRONIC PAIN OF LEFT KNEE: ICD-10-CM

## 2019-02-21 NOTE — TELEPHONE ENCOUNTER
Last Visit: 12/24/18  Next Appt: none  Previous Refill Encounter: 1/7-30+1    Requested Prescriptions     Pending Prescriptions Disp Refills    naproxen (NAPROSYN) 500 mg tablet 180 Tab 0     Sig: Take 1 Tab by mouth two (2) times daily (with meals) for 360 days.

## 2019-02-22 RX ORDER — NAPROXEN 500 MG/1
500 TABLET ORAL 2 TIMES DAILY WITH MEALS
Qty: 180 TAB | Refills: 0 | Status: SHIPPED | OUTPATIENT
Start: 2019-02-22 | End: 2019-06-08 | Stop reason: SDUPTHER

## 2019-03-18 DIAGNOSIS — M79.605 BILATERAL LEG PAIN: ICD-10-CM

## 2019-03-18 DIAGNOSIS — M79.604 BILATERAL LEG PAIN: ICD-10-CM

## 2019-03-18 RX ORDER — SULINDAC 150 MG/1
150 TABLET ORAL 2 TIMES DAILY
Qty: 180 TAB | Refills: 0 | Status: SHIPPED | OUTPATIENT
Start: 2019-03-18 | End: 2019-07-15

## 2019-03-18 NOTE — TELEPHONE ENCOUNTER
Last Visit: 12/24  Next Appt: none  Previous Refill Encounter: 12/+0    Requested Prescriptions     Pending Prescriptions Disp Refills    sulindac (CLINORIL) 150 mg tablet 180 Tab 0     Sig: Take 1 Tab by mouth two (2) times a day.

## 2019-04-03 RX ORDER — DILTIAZEM HYDROCHLORIDE 300 MG/1
CAPSULE, COATED, EXTENDED RELEASE ORAL
Qty: 90 CAP | Refills: 0 | Status: SHIPPED | OUTPATIENT
Start: 2019-04-03 | End: 2019-07-23 | Stop reason: SDUPTHER

## 2019-05-07 RX ORDER — AZITHROMYCIN 250 MG/1
TABLET, FILM COATED ORAL
Qty: 6 TAB | Refills: 0 | Status: SHIPPED | OUTPATIENT
Start: 2019-05-07 | End: 2019-05-12

## 2019-05-07 NOTE — TELEPHONE ENCOUNTER
Patient would like to see Castillo Miller today if possible he has been coughing and sneezing for the past three days and unable to work for the past two days his contact number is [phone]

## 2019-05-13 RX ORDER — AMOXICILLIN 500 MG/1
500 CAPSULE ORAL 3 TIMES DAILY
Qty: 21 CAP | Refills: 0 | Status: SHIPPED | OUTPATIENT
Start: 2019-05-13 | End: 2019-05-20

## 2019-05-13 NOTE — TELEPHONE ENCOUNTER
----- Message from [facility] sent at 5/13/2019 10:28 AM EDT -----  Regarding: Dr. Amauri Rogers / Telephone  Contact: [phone]  Pt requests an acute appt and none available. Pt stated that he is having symptoms of coughing, sneezing and congestion. Best contact number [phone].

## 2019-05-13 NOTE — TELEPHONE ENCOUNTER
Patient c/o coughing , sneezing and congestion. He states that the Marrion Or did not work.  He has used Amoxicillin

## 2019-05-22 DIAGNOSIS — R73.02 IGT (IMPAIRED GLUCOSE TOLERANCE): ICD-10-CM

## 2019-05-22 RX ORDER — METFORMIN HYDROCHLORIDE 500 MG/1
TABLET, EXTENDED RELEASE ORAL
Qty: 90 TAB | Refills: 1 | Status: SHIPPED | OUTPATIENT
Start: 2019-05-22 | End: 2020-01-14

## 2019-06-08 DIAGNOSIS — M25.562 CHRONIC PAIN OF LEFT KNEE: ICD-10-CM

## 2019-06-08 DIAGNOSIS — G89.29 CHRONIC PAIN OF LEFT KNEE: ICD-10-CM

## 2019-06-09 RX ORDER — NAPROXEN 500 MG/1
TABLET ORAL
Qty: 180 TAB | Refills: 0 | Status: SHIPPED | OUTPATIENT
Start: 2019-06-09 | End: 2019-08-30 | Stop reason: SDUPTHER

## 2019-06-18 ENCOUNTER — OFFICE VISIT (OUTPATIENT)
Dept: FAMILY MEDICINE CLINIC | Age: 54
End: 2019-06-18

## 2019-06-18 VITALS
TEMPERATURE: 97.6 F | SYSTOLIC BLOOD PRESSURE: 154 MMHG | RESPIRATION RATE: 18 BRPM | HEIGHT: 63 IN | BODY MASS INDEX: 53.19 KG/M2 | OXYGEN SATURATION: 95 % | WEIGHT: 300.2 LBS | DIASTOLIC BLOOD PRESSURE: 88 MMHG | HEART RATE: 75 BPM

## 2019-06-18 DIAGNOSIS — L02.213 CHEST WALL ABSCESS: Primary | ICD-10-CM

## 2019-06-18 RX ORDER — CEPHALEXIN 500 MG/1
500 CAPSULE ORAL 3 TIMES DAILY
Qty: 21 CAP | Refills: 0 | Status: SHIPPED | OUTPATIENT
Start: 2019-06-18 | End: 2019-07-22 | Stop reason: SDUPTHER

## 2019-06-18 NOTE — PROGRESS NOTES
Subjective:    Sandhya Fitzgerald III is a 46 y.o. male who presents for l chest wall  Abscess drainage. Painful swelling x4 days,spontaneous lwakage x 1 day. No fever or chhills. We have discussed this procedure, including option of not performing surgery, technique of surgery and potential for scarring at an earlier visit. Oubjective:   Patient appears well. Visit Vitals  /88 (BP 1 Location: Left arm, BP Patient Position: Sitting)   Pulse 75   Temp 97.6 °F (36.4 °C) (Oral)   Resp 18   Ht 5' 3\" (1.6 m)   Wt 300 lb 3.2 oz (136.2 kg)   SpO2 95%   BMI 53.18 kg/m²     Skin: warm and dry    Assessment:   skin abscess,l lateral chest wall,below axilla    Procedure Note:   After informed consent was obtained, using betadyne for cleansing and 1% lidocaine with epinephrine for anesthetic, with sterile technique, incision and drainage of abscess was performed. Gaile Chard was placed Antibiotic dressing is applied, and wound care instructions provided. Be alert for any signs of cutaneous infection. The procedure was well tolerated without complications. Follow up: the patient is to return in 2 days.

## 2019-06-18 NOTE — PROGRESS NOTES
Chief Complaint   Patient presents with    Cyst     Pt has boil under L arm. 1. Have you been to the ER, urgent care clinic since your last visit? Hospitalized since your last visit? No    2. Have you seen or consulted any other health care providers outside of the [address] [place] [address] since your last visit? Include any pap smears or colon screening.  No

## 2019-06-18 NOTE — LETTER
NOTIFICATION OF RETURN TO WORK / SCHOOL 
 
6/18/2019 9:10 AM 
 
Mr. L[address]y [phone] [facility] To Whom It May Concern: 
 
Evangelist Alves III was under the care of [facility] from 6/18/19. He will be able to return to work/school on 6/20/19 with no restrictions. If there are questions or concerns please have the patient contact our office. Sincerely, Anival Busby MD

## 2019-06-20 ENCOUNTER — OFFICE VISIT (OUTPATIENT)
Dept: FAMILY MEDICINE CLINIC | Age: 54
End: 2019-06-20

## 2019-06-20 VITALS
WEIGHT: 300 LBS | SYSTOLIC BLOOD PRESSURE: 144 MMHG | TEMPERATURE: 98 F | BODY MASS INDEX: 53.16 KG/M2 | HEIGHT: 63 IN | DIASTOLIC BLOOD PRESSURE: 98 MMHG | RESPIRATION RATE: 20 BRPM | HEART RATE: 85 BPM | OXYGEN SATURATION: 95 %

## 2019-06-20 DIAGNOSIS — L02.213 CHEST WALL ABSCESS: Primary | ICD-10-CM

## 2019-06-20 NOTE — LETTER
NOTIFICATION OF RETURN TO WORK / SCHOOL 
 
6/20/2019 10:25 AM 
 
Mr. [address] [facility] [place] [phone] Aleyda Casillas To Whom It May Concern: 
 
Lisbeth Kelly III was under the care of [facility] from 6/17/19. He will be able to return to work/school on 6/20/19 with no restrictions. If there are questions or concerns please have the patient contact our office. Sincerely, Dipak Juarez MD

## 2019-06-20 NOTE — PROGRESS NOTES
Chief Complaint   Patient presents with    Cyst     F/U for boil under L arm.  Blood sugar problem     Pt getting blood sugar checked. 1. Have you been to the ER, urgent care clinic since your last visit? Hospitalized since your last visit? No    2. Have you seen or consulted any other health care providers outside of the [address] [place] [address] since your last visit? Include any pap smears or colon screening.  No.

## 2019-07-15 ENCOUNTER — OFFICE VISIT (OUTPATIENT)
Dept: FAMILY MEDICINE CLINIC | Age: 54
End: 2019-07-15

## 2019-07-15 VITALS
WEIGHT: 305.6 LBS | OXYGEN SATURATION: 98 % | SYSTOLIC BLOOD PRESSURE: 144 MMHG | RESPIRATION RATE: 20 BRPM | BODY MASS INDEX: 54.15 KG/M2 | DIASTOLIC BLOOD PRESSURE: 85 MMHG | HEIGHT: 63 IN | TEMPERATURE: 98.4 F | HEART RATE: 80 BPM

## 2019-07-15 DIAGNOSIS — M67.971 DISORDER OF RIGHT ACHILLES TENDON: ICD-10-CM

## 2019-07-15 DIAGNOSIS — M17.11 PRIMARY OSTEOARTHRITIS OF RIGHT KNEE: Primary | ICD-10-CM

## 2019-07-15 RX ORDER — METHYLPREDNISOLONE ACETATE 40 MG/ML
40 INJECTION, SUSPENSION INTRA-ARTICULAR; INTRALESIONAL; INTRAMUSCULAR; SOFT TISSUE ONCE
Qty: 1 ML | Refills: 0
Start: 2019-07-15 | End: 2019-07-15

## 2019-07-15 NOTE — PROGRESS NOTES
Chief Complaint   Patient presents with    Leg Pain     Pt having R leg pain. 1. Have you been to the ER, urgent care clinic since your last visit? Hospitalized since your last visit? No    2. Have you seen or consulted any other health care providers outside of the [address] [place] [address] since your last visit? Include any pap smears or colon screening. No  [address]  OFFICE PROCEDURE PROGRESS NOTE        Chart reviewed for the following:   Evelia Dubin, have reviewed the History, Physical and updated the Allergic reactions for [address] performed immediately prior to start of procedure:   Evelia Dubin, have performed the following reviews on [employer] prior to the start of the procedure:            * Patient was identified by name and date of birth   * Agreement on procedure being performed was verified  * Risks and Benefits explained to the patient  * Procedure site verified and marked as necessary  * Patient was positioned for comfort  * Consent was signed and verified     Time: 10:26 am      Date of procedure: 7/15/2019    Procedure performed by: Seng Weinberg MD    Provider assisted by: N/A    Patient assisted by: N/A    How tolerated by patient: Well    Post Procedural Pain Scale: 7/10    Comments: Pt received Right knee joint injection and tolerated it well.

## 2019-07-15 NOTE — PATIENT INSTRUCTIONS
Joint Injections: Care Instructions  Your Care Instructions    Joint injections are shots into a joint, such as the knee. They may be used to put in medicines, such as pain relievers. A corticosteroid, or steroid, shot is used to reduce inflammation in tendons or joints. It is often used to treat problems such as arthritis, tendinitis, and bursitis. Steroids can be injected directly into a painful, inflamed joint. They can also help reduce inflammation of a bursa. A bursa is a sac of fluid. It cushions and lubricates areas where tendons, ligaments, skin, muscles, or bones rub against each other. A steroid shot can sometimes help with short-term pain relief when other treatments haven't worked. If steroid shots help, pain may improve for weeks or months. Follow-up care is a key part of your treatment and safety. Be sure to make and go to all appointments, and call your doctor if you are having problems. It's also a good idea to know your test results and keep a list of the medicines you take. How can you care for yourself at home? · Put ice or a cold pack on the area for 10 to 20 minutes at a time. Put a thin cloth between the ice and your skin. · Ask your doctor if you can take an over-the-counter pain medicine, such as acetaminophen (Tylenol), ibuprofen (Advil, Motrin), or naproxen (Aleve). Be safe with medicines. Read and follow all instructions on the label. · Avoid strenuous activities for several days. In particular, avoid ones that put stress on the area where you got the shot. · If you have dressings over the area, keep them clean and dry. You may remove them when your doctor tells you to. When should you call for help? Call your doctor now or seek immediate medical care if:    · You have signs of infection, such as:  ? Increased pain, swelling, warmth, or redness. ? Red streaks leading from the site. ? Pus draining from the site.   ? A fever.    Watch closely for changes in your health, and be sure to contact your doctor if you have any problems. Where can you learn more? Go to http://elijah-reagan.info/. Enter N616 in the search box to learn more about \"Joint Injections: Care Instructions. \"  Current as of: September 20, 2018  Content Version: 11.9  © 7702-7180 [employer]. Care instructions adapted under license by [employer] (which disclaims liability or warranty for this information). If you have questions about a medical condition or this instruction, always ask your healthcare professional. [address] any warranty or liability for your use of this information.

## 2019-07-15 NOTE — LETTER
NOTIFICATION OF RETURN TO WORK / SCHOOL 
 
7/15/2019 10:10 AM 
 
Mr. Miranda Tatum [phone] [id number] [facility] To Whom It May Concern: 
 
Fabrice Garrison III was under the care of [facility] from 7/15/19 He will be able to return to work/school on 7/15/19 with no restrictions. If there are questions or concerns please have the patient contact our office. Sincerely, Benay Leyden, MD

## 2019-07-22 DIAGNOSIS — L02.213 CHEST WALL ABSCESS: ICD-10-CM

## 2019-07-23 RX ORDER — DILTIAZEM HYDROCHLORIDE 300 MG/1
CAPSULE, COATED, EXTENDED RELEASE ORAL
Qty: 90 CAP | Refills: 0 | Status: SHIPPED | OUTPATIENT
Start: 2019-07-23 | End: 2019-10-11 | Stop reason: SDUPTHER

## 2019-07-23 RX ORDER — CEPHALEXIN 500 MG/1
CAPSULE ORAL
Qty: 21 CAP | Refills: 0 | Status: SHIPPED | OUTPATIENT
Start: 2019-07-23 | End: 2019-08-13 | Stop reason: ALTCHOICE

## 2019-08-13 ENCOUNTER — OFFICE VISIT (OUTPATIENT)
Dept: FAMILY MEDICINE CLINIC | Age: 54
End: 2019-08-13

## 2019-08-13 VITALS
SYSTOLIC BLOOD PRESSURE: 128 MMHG | OXYGEN SATURATION: 97 % | HEART RATE: 77 BPM | TEMPERATURE: 98.3 F | DIASTOLIC BLOOD PRESSURE: 84 MMHG | HEIGHT: 63 IN | RESPIRATION RATE: 17 BRPM | WEIGHT: 297.3 LBS | BODY MASS INDEX: 52.68 KG/M2

## 2019-08-13 DIAGNOSIS — R73.02 IGT (IMPAIRED GLUCOSE TOLERANCE): Primary | ICD-10-CM

## 2019-08-13 DIAGNOSIS — E78.2 MIXED HYPERLIPIDEMIA: ICD-10-CM

## 2019-08-13 DIAGNOSIS — Z00.00 ANNUAL PHYSICAL EXAM: ICD-10-CM

## 2019-08-13 DIAGNOSIS — M17.11 PRIMARY OSTEOARTHRITIS OF RIGHT KNEE: ICD-10-CM

## 2019-08-13 DIAGNOSIS — I10 ESSENTIAL HYPERTENSION, BENIGN: ICD-10-CM

## 2019-08-13 LAB
BILIRUB UR QL STRIP: NEGATIVE
GLUCOSE POC: 125 MG/DL
GLUCOSE UR-MCNC: NEGATIVE MG/DL
HBA1C MFR BLD HPLC: 7.5 %
KETONES P FAST UR STRIP-MCNC: NEGATIVE MG/DL
PH UR STRIP: 7 [PH] (ref 4.6–8)
PROT UR QL STRIP: NEGATIVE
SP GR UR STRIP: 1.01 (ref 1–1.03)
UA UROBILINOGEN AMB POC: NORMAL (ref 0.2–1)
URINALYSIS CLARITY POC: CLEAR
URINALYSIS COLOR POC: YELLOW
URINE BLOOD POC: NEGATIVE
URINE LEUKOCYTES POC: NEGATIVE
URINE NITRITES POC: NEGATIVE

## 2019-08-13 NOTE — PROGRESS NOTES
Chief Complaint   Patient presents with    Physical     Here for yearly physical.  He would like to talk to dr. Seamus Martinez about migranes that have been coming on weekly. 1. Have you been to the ER, urgent care clinic since your last visit? Hospitalized since your last visit? No    2. Have you seen or consulted any other health care providers outside of the [address] [place] [address] since your last visit? Include any pap smears or colon screening.  No

## 2019-08-13 NOTE — PROGRESS NOTES
Subjective:     Chiquis Dumont is a 46 y.o. male presenting for annual exam and complete physical.    Patient Active Problem List   Diagnosis Code    Essential hypertension, benign I10    ED (erectile dysfunction) N52.9    GERD (gastroesophageal reflux disease) K21.9    Migraine syndrome G43.909    WESTLEY (obstructive sleep apnea) G47.33    Family history of prostate cancer Z80.42    Mixed hyperlipidemia E78.2    Encounter for long-term (current) use of other medications Z79.899    Family history of colon cancer Z80.0    Nocturia R35.1    IGT (impaired glucose tolerance) R73.02    Obesity, morbid (HCC) E66.01     Patient Active Problem List    Diagnosis Date Noted    Obesity, morbid ([facility] [address] 01/22/2018    Nocturia 06/14/2017    IGT (impaired glucose tolerance) 06/14/2017    Family history of colon cancer 02/17/2012    Mixed hyperlipidemia 04/10/2011    Encounter for long-term (current) use of other medications 04/10/2011    Family history of prostate cancer 10/04/2010    Essential hypertension, benign 06/15/2010    ED (erectile dysfunction) 06/15/2010    GERD (gastroesophageal reflux disease) 06/15/2010    Migraine syndrome 06/15/2010    WESTLEY (obstructive sleep apnea) 06/15/2010     Current Outpatient Medications   Medication Sig Dispense Refill    dilTIAZem CD (CARDIZEM CD) 300 mg ER capsule TAKE 1 CAPSULE BY MOUTH EVERY DAY 90 Cap 0    naproxen (NAPROSYN) 500 mg tablet TAKE 1 TABLET BY MOUTH TWO (2) TIMES DAILY (WITH MEALS)  DAYS. 180 Tab 0    metFORMIN ER (GLUCOPHAGE XR) 500 mg tablet TAKE 1 TAB BY MOUTH DAILY WITH DINNER 90 Tab 1    chlorthalidone (HYGROTEN) 25 mg tablet TAKE 1 TAB BY MOUTH DAILY. 90 Tab 3    losartan (COZAAR) 100 mg tablet Take 1 Tab by mouth daily. 30 Tab 11    glucose blood VI test strips (ONETOUCH VERIO) strip by Does Not Apply route daily. Check blood sugars once daily 50 Strip 11    lancets (ONE TOUCH DELICA) 33 gauge misc by Does Not Apply route daily. Check blood sugars once daily 50 Lancet 11    triamcinolone acetonide (KENALOG) 0.1 % ointment Apply  to affected area two (2) times a day. use thin layer 85 g 0    traZODone (DESYREL) 50 mg tablet Take 1 Tab by mouth nightly. 30 Tab 2    butalbital-acetaminophen (PHRENILIN)  mg tablet Take 1 Tab by mouth every six (6) hours as needed. 30 Tab 2     Allergies   Allergen Reactions    Ramipril Cough     Past Medical History:   Diagnosis Date    Cancer [facility])     ED (erectile dysfunction) 6/15/2010    Encounter for long-term (current) use of other medications 4/10/2011    Essential hypertension, benign 6/15/2010    Family history of colon cancer 2/17/2012    GERD (gastroesophageal reflux disease) 6/15/2010    Headache(784.0)     Hypertension     Migraine syndrome 6/15/2010    Mixed hyperlipidemia 4/10/2011    WESTLEY (obstructive sleep apnea) 6/15/2010     Past Surgical History:   Procedure Laterality Date    COLONOSCOPY N/A 6/7/2016    COLONOSCOPY performed by Manpreet Devine MD at . [facility] [address]/CAUTERY  6/7/2016         HX COLONOSCOPY      HX UROLOGICAL       Family History   Problem Relation Age of Onset    Diabetes Mother     Stroke Mother     Hypertension Mother     Cancer Father     Diabetes Father      Social History     Tobacco Use    Smoking status: Never Smoker    Smokeless tobacco: Never Used   Substance Use Topics    Alcohol use:  Yes     Alcohol/week: 1.7 standard drinks     Types: 2 Cans of beer per week             Review of Systems  Constitutional: negative  Eyes: negative  Ears, nose, mouth, throat, and face: negative  Respiratory: negative  Cardiovascular: negative  Gastrointestinal: negative  Genitourinary:negative  Musculoskeletal:negative  Neurological: positive for headaches    Objective:     Visit Vitals  /84 (BP 1 Location: Left arm, BP Patient Position: Sitting)   Pulse 77   Temp 98.3 °F (36.8 °C) (Oral)   Resp 17   Ht 5' 2.99\" (1.6 m)   Wt 297 lb 4.8 oz (134.9 kg)   SpO2 97%   BMI 52.68 kg/m²     Physical exam:   General appearance - alert, well appearing, and in no distress and overweight  Mental status - alert, oriented to person, place, and time  Eyes - pupils equal and reactive, extraocular eye movements intact  Ears - bilateral TM's and external ear canals normal  Nose - normal and patent, no erythema, discharge or polyps  Mouth - mucous membranes moist, pharynx normal without lesions  Neck - supple, no significant adenopathy  Chest - clear to auscultation, no wheezes, rales or rhonchi, symmetric air entry  Heart - normal rate, regular rhythm, normal S1, S2, no murmurs, rubs, clicks or gallops  Abdomen - soft, nontender, nondistended, no masses or organomegaly  Rectal - negative without mass, lesions or tenderness,stool guiac negative,prostate smooth ,nonenlarged  Neurological - alert, oriented, normal speech, no focal findings or movement disorder noted  Musculoskeletal - no joint tenderness, deformity or swelling  Extremities - peripheral pulses normal, no pedal edema, no clubbing or cyanosis  Skin - normal coloration and turgor, no rashes, no suspicious skin lesions noted . Comprehensive Diabetic Foot Exam  was performed      Assessment/Plan:   Well Male Exam  continue present plan, routine labs ordered, call if any problems. Diagnoses and all orders for this visit:    1. IGT (impaired glucose tolerance)  -     AMB POC HEMOGLOBIN A1C  -     AMB POC GLUCOSE, QUANTITATIVE, BLOOD  -     MICROALBUMIN, UR, RAND W/ MICROALB/CREAT RATIO    2. Annual physical exam  -     PSA, DIAGNOSTIC (PROSTATE SPECIFIC AG)    3. Essential hypertension, benign  -     METABOLIC PANEL, COMPREHENSIVE  -     CBC WITH AUTOMATED DIFF  -     AMB POC URINALYSIS DIP STICK AUTO W/O MICRO    4. Primary osteoarthritis of right knee    5. Mixed hyperlipidemia  -     LIPID PANEL      Follow-up and Dispositions    · Return in about 3 months (around 11/13/2019).      Kassidy Harrison

## 2019-08-14 LAB
ALBUMIN SERPL-MCNC: 4.5 G/DL (ref 3.5–5.5)
ALBUMIN/CREAT UR: 9.6 MG/G CREAT (ref 0–30)
ALBUMIN/GLOB SERPL: 1.7 {RATIO} (ref 1.2–2.2)
ALP SERPL-CCNC: 82 IU/L (ref 39–117)
ALT SERPL-CCNC: 20 IU/L (ref 0–44)
AST SERPL-CCNC: 17 IU/L (ref 0–40)
BASOPHILS # BLD AUTO: 0 X10E3/UL (ref 0–0.2)
BASOPHILS NFR BLD AUTO: 0 %
BILIRUB SERPL-MCNC: 0.3 MG/DL (ref 0–1.2)
BUN SERPL-MCNC: 12 MG/DL (ref 6–24)
BUN/CREAT SERPL: 11 (ref 9–20)
CALCIUM SERPL-MCNC: 9.7 MG/DL (ref 8.7–10.2)
CHLORIDE SERPL-SCNC: 101 MMOL/L (ref 96–106)
CHOLEST SERPL-MCNC: 180 MG/DL (ref 100–199)
CO2 SERPL-SCNC: 27 MMOL/L (ref 20–29)
CREAT SERPL-MCNC: 1.06 MG/DL (ref 0.76–1.27)
CREAT UR-MCNC: 130.4 MG/DL
EOSINOPHIL # BLD AUTO: 0.1 X10E3/UL (ref 0–0.4)
EOSINOPHIL NFR BLD AUTO: 1 %
ERYTHROCYTE [DISTWIDTH] IN BLOOD BY AUTOMATED COUNT: 15.2 % (ref 12.3–15.4)
GLOBULIN SER CALC-MCNC: 2.7 G/DL (ref 1.5–4.5)
GLUCOSE SERPL-MCNC: 119 MG/DL (ref 65–99)
HCT VFR BLD AUTO: 42.1 % (ref 37.5–51)
HDLC SERPL-MCNC: 63 MG/DL
HGB BLD-MCNC: 14.1 G/DL (ref 13–17.7)
IMM GRANULOCYTES # BLD AUTO: 0.1 X10E3/UL (ref 0–0.1)
IMM GRANULOCYTES NFR BLD AUTO: 1 %
INTERPRETATION, 910389: NORMAL
LDLC SERPL CALC-MCNC: 95 MG/DL (ref 0–99)
LYMPHOCYTES # BLD AUTO: 2.7 X10E3/UL (ref 0.7–3.1)
LYMPHOCYTES NFR BLD AUTO: 21 %
MCH RBC QN AUTO: 27.8 PG (ref 26.6–33)
MCHC RBC AUTO-ENTMCNC: 33.5 G/DL (ref 31.5–35.7)
MCV RBC AUTO: 83 FL (ref 79–97)
MICROALBUMIN UR-MCNC: 12.5 UG/ML
MONOCYTES # BLD AUTO: 1.1 X10E3/UL (ref 0.1–0.9)
MONOCYTES NFR BLD AUTO: 8 %
NEUTROPHILS # BLD AUTO: 8.8 X10E3/UL (ref 1.4–7)
NEUTROPHILS NFR BLD AUTO: 69 %
PLATELET # BLD AUTO: 331 X10E3/UL (ref 150–450)
POTASSIUM SERPL-SCNC: 3.6 MMOL/L (ref 3.5–5.2)
PROT SERPL-MCNC: 7.2 G/DL (ref 6–8.5)
PSA SERPL-MCNC: 1.1 NG/ML (ref 0–4)
RBC # BLD AUTO: 5.07 X10E6/UL (ref 4.14–5.8)
SODIUM SERPL-SCNC: 142 MMOL/L (ref 134–144)
TRIGL SERPL-MCNC: 111 MG/DL (ref 0–149)
VLDLC SERPL CALC-MCNC: 22 MG/DL (ref 5–40)
WBC # BLD AUTO: 12.7 X10E3/UL (ref 3.4–10.8)

## 2019-08-30 DIAGNOSIS — M25.562 CHRONIC PAIN OF LEFT KNEE: ICD-10-CM

## 2019-08-30 DIAGNOSIS — G89.29 CHRONIC PAIN OF LEFT KNEE: ICD-10-CM

## 2019-08-31 RX ORDER — NAPROXEN 500 MG/1
TABLET ORAL
Qty: 180 TAB | Refills: 0 | Status: SHIPPED | OUTPATIENT
Start: 2019-08-31 | End: 2020-04-02

## 2019-09-11 ENCOUNTER — OFFICE VISIT (OUTPATIENT)
Dept: FAMILY MEDICINE CLINIC | Age: 54
End: 2019-09-11

## 2019-09-11 VITALS
DIASTOLIC BLOOD PRESSURE: 92 MMHG | SYSTOLIC BLOOD PRESSURE: 140 MMHG | HEART RATE: 107 BPM | HEIGHT: 62 IN | RESPIRATION RATE: 20 BRPM | TEMPERATURE: 98.5 F | WEIGHT: 306.6 LBS | OXYGEN SATURATION: 97 % | BODY MASS INDEX: 56.42 KG/M2

## 2019-09-11 DIAGNOSIS — M25.522 ELBOW PAIN, LEFT: Primary | ICD-10-CM

## 2019-09-11 DIAGNOSIS — M70.22 OLECRANON BURSITIS OF LEFT ELBOW: ICD-10-CM

## 2019-09-11 RX ORDER — METHYLPREDNISOLONE ACETATE 40 MG/ML
40 INJECTION, SUSPENSION INTRA-ARTICULAR; INTRALESIONAL; INTRAMUSCULAR; SOFT TISSUE ONCE
Qty: 1 ML | Refills: 0
Start: 2019-09-11 | End: 2019-09-11

## 2019-09-11 NOTE — LETTER
NOTIFICATION OF RETURN TO WORK / SCHOOL 
 
9/11/2019 4:51 PM 
 
[facility] [phone] [address] To Whom It May Concern: 
 
Padma Xiong III was under the care of [facility] from 9/11/19. He will be able to return to work/school on 9/14/19 with no restrictions. If there are questions or concerns please have the patient contact our office. Sincerely, Pascale Pinto MD

## 2019-09-11 NOTE — PROGRESS NOTES
HISTORY OF PRESENT ILLNESS  Samantha Harmon III is a 46 y.o. male. 1 week l elbow swelling and pain,no apparent injury. Seen at  ,diagnosed with bursitis ,treated with prednisone and rest with minimal improvement  Elbow Pain    The history is provided by the patient. This is a new problem. The current episode started more than 1 week ago. The problem occurs daily. The problem has not changed since onset. The pain is present in the left elbow. The quality of the pain is described as aching. The pain is at a severity of 5/10. The pain is moderate. Pertinent negatives include no numbness. Review of Systems   Constitutional: Negative for chills, fever and malaise/fatigue. Musculoskeletal: Positive for joint pain. Skin: Negative for rash. Neurological: Negative for numbness. Physical Exam   Constitutional: He appears well-developed and well-nourished. HENT:   Head: Normocephalic and atraumatic. Right Ear: External ear normal.   Left Ear: External ear normal.   Mouth/Throat: Oropharynx is clear and moist.   Cardiovascular: Normal rate and regular rhythm. Musculoskeletal:        Left elbow: He exhibits decreased range of motion and swelling. He exhibits no effusion and no deformity. Tenderness found. Olecranon process tenderness noted. Skin: Skin is warm and dry. No erythema. ASSESSMENT and PLAN  Diagnoses and all orders for this visit:    1. Elbow pain, left  -     XR ELBOW LT MIN 3 V; Future    2. Olecranon bursitis of left elbow  -     XR ELBOW LT MIN 3 V; Future      Follow-up and Dispositions    · Return if symptoms worsen or fail to improve. Indications:   Olecranon bursitis    Procedure:  After consent was obtained, using sterile technique the L Olecranon bursa was prepped using Betadine. . The bursa was entered and Depomedrol 40 mg mg was mixed with 0.5% marcaine 1 ml  and injected into the bursa and the needle withdrawn. The procedure was well tolerated.   The patient is asked to continue to rest the joint for a few more days before resuming regular activities. It may be more painful for the first 1-2 days. Watch for fever, or increased swelling or persistent pain in the joint. Call or return to clinic prn if such symptoms occur or there is failure to improve as anticipated.

## 2019-09-12 DIAGNOSIS — I10 ESSENTIAL HYPERTENSION, BENIGN: ICD-10-CM

## 2019-09-12 RX ORDER — LOSARTAN POTASSIUM 100 MG/1
TABLET ORAL
Qty: 90 TAB | Refills: 3 | Status: SHIPPED | OUTPATIENT
Start: 2019-09-12 | End: 2020-08-25

## 2019-09-12 NOTE — PATIENT INSTRUCTIONS
Joint Injections: Care Instructions  Your Care Instructions    Joint injections are shots into a joint, such as the knee. They may be used to put in medicines, such as pain relievers. A corticosteroid, or steroid, shot is used to reduce inflammation in tendons or joints. It is often used to treat problems such as arthritis, tendinitis, and bursitis. Steroids can be injected directly into a painful, inflamed joint. They can also help reduce inflammation of a bursa. A bursa is a sac of fluid. It cushions and lubricates areas where tendons, ligaments, skin, muscles, or bones rub against each other. A steroid shot can sometimes help with short-term pain relief when other treatments haven't worked. If steroid shots help, pain may improve for weeks or months. Follow-up care is a key part of your treatment and safety. Be sure to make and go to all appointments, and call your doctor if you are having problems. It's also a good idea to know your test results and keep a list of the medicines you take. How can you care for yourself at home? · Put ice or a cold pack on the area for 10 to 20 minutes at a time. Put a thin cloth between the ice and your skin. · Ask your doctor if you can take an over-the-counter pain medicine, such as acetaminophen (Tylenol), ibuprofen (Advil, Motrin), or naproxen (Aleve). Be safe with medicines. Read and follow all instructions on the label. · Avoid strenuous activities for several days. In particular, avoid ones that put stress on the area where you got the shot. · If you have dressings over the area, keep them clean and dry. You may remove them when your doctor tells you to. When should you call for help? Call your doctor now or seek immediate medical care if:    · You have signs of infection, such as:  ? Increased pain, swelling, warmth, or redness. ? Red streaks leading from the site. ? Pus draining from the site.   ? A fever.    Watch closely for changes in your health, and be sure to contact your doctor if you have any problems. Where can you learn more? Go to http://elijah-reagan.info/. Enter N616 in the search box to learn more about \"Joint Injections: Care Instructions. \"  Current as of: September 20, 2018  Content Version: 12.1  © 3561-0700 [employer]. Care instructions adapted under license by [employer] (which disclaims liability or warranty for this information). If you have questions about a medical condition or this instruction, always ask your healthcare professional. [address] any warranty or liability for your use of this information.

## 2019-09-12 NOTE — PROGRESS NOTES
[facility]  OFFICE PROCEDURE PROGRESS NOTE        Chart reviewed for the following:   Erma Veronica, have reviewed the History, Physical and updated the Allergic reactions for [address] performed immediately prior to start of procedure:   Erma Veronica, have performed the following reviews on [address] prior to the start of the procedure:            * Patient was identified by name and date of birth   * Agreement on procedure being performed was verified  * Risks and Benefits explained to the patient  * Procedure site verified and marked as necessary  * Patient was positioned for comfort  * Consent was signed and verified     Time: 4:50 pm      Date of procedure: 9/12/2019    Procedure performed by: Lin Bazan MD    Provider assisted by: N/A    Patient assisted by: N/A    How tolerated by patient: Well    Post Procedural Pain Scale: 6/10    Comments: Pt received Left joint injection and tolerated it well.

## 2019-09-16 RX ORDER — HYDROCODONE BITARTRATE AND ACETAMINOPHEN 5; 325 MG/1; MG/1
1 TABLET ORAL
Qty: 40 TAB | Refills: 0 | OUTPATIENT
Start: 2019-09-16 | End: 2019-10-16

## 2019-10-24 ENCOUNTER — OFFICE VISIT (OUTPATIENT)
Dept: FAMILY MEDICINE CLINIC | Age: 54
End: 2019-10-24

## 2019-10-24 VITALS
HEIGHT: 62 IN | BODY MASS INDEX: 55.57 KG/M2 | RESPIRATION RATE: 18 BRPM | WEIGHT: 302 LBS | DIASTOLIC BLOOD PRESSURE: 84 MMHG | SYSTOLIC BLOOD PRESSURE: 148 MMHG | HEART RATE: 100 BPM | OXYGEN SATURATION: 97 % | TEMPERATURE: 97.7 F

## 2019-10-24 DIAGNOSIS — E78.2 MIXED HYPERLIPIDEMIA: ICD-10-CM

## 2019-10-24 DIAGNOSIS — I10 ESSENTIAL HYPERTENSION, BENIGN: ICD-10-CM

## 2019-10-24 DIAGNOSIS — Z01.818 PREOPERATIVE EXAMINATION: Primary | ICD-10-CM

## 2019-10-24 DIAGNOSIS — S46.312D RUPTURE OF LEFT TRICEPS TENDON, SUBSEQUENT ENCOUNTER: ICD-10-CM

## 2019-10-24 DIAGNOSIS — E11.9 TYPE 2 DIABETES MELLITUS WITHOUT COMPLICATION, WITHOUT LONG-TERM CURRENT USE OF INSULIN (HCC): ICD-10-CM

## 2019-10-24 NOTE — PROGRESS NOTES
Subjective:     Wilmer Wills is a 53 y.o. male presenting for preop exam.    Patient Active Problem List   Diagnosis Code    Essential hypertension, benign I10    ED (erectile dysfunction) N52.9    GERD (gastroesophageal reflux disease) K21.9    Migraine syndrome G43.909    WESTLEY (obstructive sleep apnea) G47.33    Family history of prostate cancer Z80.42    Mixed hyperlipidemia E78.2    Encounter for long-term (current) use of other medications Z79.899    Family history of colon cancer Z80.0    Nocturia R35.1    IGT (impaired glucose tolerance) R73.02    Obesity, morbid (HCC) E66.01     Patient Active Problem List    Diagnosis Date Noted    Obesity, morbid ([facility] [address] 01/22/2018    Nocturia 06/14/2017    IGT (impaired glucose tolerance) 06/14/2017    Family history of colon cancer 02/17/2012    Mixed hyperlipidemia 04/10/2011    Encounter for long-term (current) use of other medications 04/10/2011    Family history of prostate cancer 10/04/2010    Essential hypertension, benign 06/15/2010    ED (erectile dysfunction) 06/15/2010    GERD (gastroesophageal reflux disease) 06/15/2010    Migraine syndrome 06/15/2010    WESTLEY (obstructive sleep apnea) 06/15/2010     Current Outpatient Medications   Medication Sig Dispense Refill    dilTIAZem CD (CARDIZEM CD) 300 mg ER capsule TAKE 1 CAPSULE BY MOUTH EVERY DAY 90 Cap 2    losartan (COZAAR) 100 mg tablet TAKE 1 TABLET BY MOUTH EVERY DAY 90 Tab 3    metFORMIN ER (GLUCOPHAGE XR) 500 mg tablet TAKE 1 TAB BY MOUTH DAILY WITH DINNER 90 Tab 1    chlorthalidone (HYGROTEN) 25 mg tablet TAKE 1 TAB BY MOUTH DAILY. 90 Tab 3    glucose blood VI test strips (ONETOUCH VERIO) strip by Does Not Apply route daily. Check blood sugars once daily 50 Strip 11    lancets (ONE TOUCH DELICA) 33 gauge misc by Does Not Apply route daily.  Check blood sugars once daily 50 Lancet 11    naproxen (NAPROSYN) 500 mg tablet TAKE 1 TABLET BY MOUTH TWICE A DAY WITH MEALS (Patient not taking: Reported on 10/24/2019) 180 Tab 0    triamcinolone acetonide (KENALOG) 0.1 % ointment Apply  to affected area two (2) times a day. use thin layer 85 g 0    traZODone (DESYREL) 50 mg tablet Take 1 Tab by mouth nightly. 30 Tab 2    butalbital-acetaminophen (PHRENILIN)  mg tablet Take 1 Tab by mouth every six (6) hours as needed. (Patient not taking: Reported on 10/24/2019) 30 Tab 2     Allergies   Allergen Reactions    Ramipril Cough     Past Medical History:   Diagnosis Date    Cancer ([facility] [address]     Diabetes ([facility] [address]     ED (erectile dysfunction) 6/15/2010    Encounter for long-term (current) use of other medications 4/10/2011    Essential hypertension, benign 6/15/2010    Family history of colon cancer 2/17/2012    GERD (gastroesophageal reflux disease) 6/15/2010    Headache(784.0)     Hypertension     Migraine syndrome 6/15/2010    Mixed hyperlipidemia 4/10/2011    WESTLEY (obstructive sleep apnea) 6/15/2010     Past Surgical History:   Procedure Laterality Date    COLONOSCOPY N/A 6/7/2016    COLONOSCOPY performed by Michael Gabriel MD at . [facility] [address]/CAUTERY  6/7/2016         HX COLONOSCOPY      HX UROLOGICAL       Family History   Problem Relation Age of Onset    Diabetes Mother     Stroke Mother     Hypertension Mother     Cancer Father     Diabetes Father      Social History     Tobacco Use    Smoking status: Never Smoker    Smokeless tobacco: Never Used   Substance Use Topics    Alcohol use:  Yes     Alcohol/week: 1.7 standard drinks     Types: 2 Cans of beer per week             Review of Systems  Constitutional: negative  Eyes: negative  Ears, nose, mouth, throat, and face: negative  Respiratory: negative  Cardiovascular: negative  Gastrointestinal: negative  Genitourinary:negative  Musculoskeletal:positive for l elbow pain    Objective:     Visit Vitals  /84 (BP 1 Location: Left arm, BP Patient Position: Sitting)   Pulse 100   Temp 97.7 °F (36.5 °C) (Oral)   Resp 18   Ht 5' 2\" (1.575 m)   Wt 302 lb (137 kg)   SpO2 97%   BMI 55.24 kg/m²     Physical exam:   General appearance - alert, well appearing, and in no distress  Mental status - alert, oriented to person, place, and time  Eyes - pupils equal and reactive, extraocular eye movements intact  Ears - bilateral TM's and external ear canals normal  Nose - normal and patent, no erythema, discharge or polyps  Mouth - mucous membranes moist, pharynx normal without lesions  Neck - supple, no significant adenopathy, carotids upstroke normal bilaterally, no bruits, thyroid exam: thyroid is normal in size without nodules or tenderness  Chest - clear to auscultation, no wheezes, rales or rhonchi, symmetric air entry  Heart - normal rate, regular rhythm, normal S1, S2, no murmurs, rubs, clicks or gallops  Abdomen - soft, nontender, nondistended, no masses or organomegaly  Musculoskeletal - abnormal exam of left elbow,diminished ROM  Extremities - peripheral pulses normal, no pedal edema, no clubbing or cyanosis     Assessment/Plan:     Cleared for Surgery  continue present plan, routine labs ordered, call if any problems. Diagnoses and all orders for this visit:    1. Preoperative examination  -     METABOLIC PANEL, COMPREHENSIVE  -     CBC WITH AUTOMATED DIFF  -     AMB POC EKG ROUTINE W/ 12 LEADS, INTER & REP  -     PROTHROMBIN TIME + INR  -     PTT    2. Rupture of left triceps tendon, subsequent encounter    3. Essential hypertension, benign    4. Mixed hyperlipidemia    5. Type 2 diabetes mellitus without complication, without long-term current use of insulin ([facility] [address]      Follow-up and Dispositions    · Return in about 2 months (around 12/24/2019).      Satnam Longoria

## 2019-10-24 NOTE — PROGRESS NOTES
Chief Complaint   Patient presents with    Pre-op Exam     Pt getting pre-op exam for elbow surgery on 10-29-19.     1. Have you been to the ER, urgent care clinic since your last visit? Hospitalized since your last visit? No    2. Have you seen or consulted any other health care providers outside of the [address] [place] [address] since your last visit? Include any pap smears or colon screening.  No

## 2019-10-25 LAB
ALBUMIN SERPL-MCNC: 4.5 G/DL (ref 3.5–5.5)
ALBUMIN/GLOB SERPL: 1.9 {RATIO} (ref 1.2–2.2)
ALP SERPL-CCNC: 74 IU/L (ref 39–117)
ALT SERPL-CCNC: 24 IU/L (ref 0–44)
APTT PPP: 27 SEC (ref 24–33)
AST SERPL-CCNC: 25 IU/L (ref 0–40)
BASOPHILS # BLD AUTO: 0 X10E3/UL (ref 0–0.2)
BASOPHILS NFR BLD AUTO: 0 %
BILIRUB SERPL-MCNC: <0.2 MG/DL (ref 0–1.2)
BUN SERPL-MCNC: 12 MG/DL (ref 6–24)
BUN/CREAT SERPL: 12 (ref 9–20)
CALCIUM SERPL-MCNC: 9.9 MG/DL (ref 8.7–10.2)
CHLORIDE SERPL-SCNC: 100 MMOL/L (ref 96–106)
CO2 SERPL-SCNC: 28 MMOL/L (ref 20–29)
CREAT SERPL-MCNC: 1.01 MG/DL (ref 0.76–1.27)
EOSINOPHIL # BLD AUTO: 0.2 X10E3/UL (ref 0–0.4)
EOSINOPHIL NFR BLD AUTO: 2 %
ERYTHROCYTE [DISTWIDTH] IN BLOOD BY AUTOMATED COUNT: 14.7 % (ref 12.3–15.4)
GLOBULIN SER CALC-MCNC: 2.4 G/DL (ref 1.5–4.5)
GLUCOSE SERPL-MCNC: 247 MG/DL (ref 65–99)
HCT VFR BLD AUTO: 37.5 % (ref 37.5–51)
HGB BLD-MCNC: 13.2 G/DL (ref 13–17.7)
IMM GRANULOCYTES # BLD AUTO: 0.1 X10E3/UL (ref 0–0.1)
IMM GRANULOCYTES NFR BLD AUTO: 1 %
INR PPP: 1 (ref 0.8–1.2)
LYMPHOCYTES # BLD AUTO: 1.8 X10E3/UL (ref 0.7–3.1)
LYMPHOCYTES NFR BLD AUTO: 17 %
MCH RBC QN AUTO: 28.1 PG (ref 26.6–33)
MCHC RBC AUTO-ENTMCNC: 35.2 G/DL (ref 31.5–35.7)
MCV RBC AUTO: 80 FL (ref 79–97)
MONOCYTES # BLD AUTO: 0.9 X10E3/UL (ref 0.1–0.9)
MONOCYTES NFR BLD AUTO: 8 %
NEUTROPHILS # BLD AUTO: 7.6 X10E3/UL (ref 1.4–7)
NEUTROPHILS NFR BLD AUTO: 72 %
PLATELET # BLD AUTO: 343 X10E3/UL (ref 150–450)
POTASSIUM SERPL-SCNC: 3.5 MMOL/L (ref 3.5–5.2)
PROT SERPL-MCNC: 6.9 G/DL (ref 6–8.5)
PROTHROMBIN TIME: 10.2 SEC (ref 9.1–12)
RBC # BLD AUTO: 4.7 X10E6/UL (ref 4.14–5.8)
SODIUM SERPL-SCNC: 141 MMOL/L (ref 134–144)
WBC # BLD AUTO: 10.6 X10E3/UL (ref 3.4–10.8)

## 2019-10-28 RX ORDER — TADALAFIL 20 MG/1
TABLET, FILM COATED ORAL
Qty: 8 TAB | Refills: 8 | Status: SHIPPED | OUTPATIENT
Start: 2019-10-28 | End: 2020-11-11

## 2019-12-11 RX ORDER — LANCETS 33 GAUGE
EACH MISCELLANEOUS DAILY
Qty: 100 LANCET | Refills: 3 | Status: SHIPPED | OUTPATIENT
Start: 2019-12-11 | End: 2022-08-16 | Stop reason: ALTCHOICE

## 2020-01-05 DIAGNOSIS — I10 ESSENTIAL HYPERTENSION, BENIGN: ICD-10-CM

## 2020-01-06 RX ORDER — CHLORTHALIDONE 25 MG/1
TABLET ORAL
Qty: 90 TAB | Refills: 3 | Status: SHIPPED | OUTPATIENT
Start: 2020-01-06 | End: 2021-01-15

## 2020-01-14 DIAGNOSIS — E11.9 TYPE 2 DIABETES MELLITUS WITHOUT COMPLICATION, WITHOUT LONG-TERM CURRENT USE OF INSULIN (HCC): Primary | ICD-10-CM

## 2020-01-14 DIAGNOSIS — R73.02 IGT (IMPAIRED GLUCOSE TOLERANCE): ICD-10-CM

## 2020-01-14 RX ORDER — METFORMIN HYDROCHLORIDE 500 MG/1
1000 TABLET, EXTENDED RELEASE ORAL
Qty: 180 TAB | Refills: 1 | Status: SHIPPED | OUTPATIENT
Start: 2020-01-14 | End: 2020-11-30

## 2020-01-14 RX ORDER — INSULIN PUMP SYRINGE, 3 ML
EACH MISCELLANEOUS
Qty: 1 KIT | Refills: 0 | Status: SHIPPED | OUTPATIENT
Start: 2020-01-14

## 2020-02-13 ENCOUNTER — OFFICE VISIT (OUTPATIENT)
Dept: FAMILY MEDICINE CLINIC | Age: 55
End: 2020-02-13

## 2020-02-13 VITALS
DIASTOLIC BLOOD PRESSURE: 84 MMHG | HEIGHT: 62 IN | RESPIRATION RATE: 18 BRPM | WEIGHT: 308.6 LBS | HEART RATE: 94 BPM | SYSTOLIC BLOOD PRESSURE: 136 MMHG | TEMPERATURE: 98 F | BODY MASS INDEX: 56.79 KG/M2 | OXYGEN SATURATION: 96 %

## 2020-02-13 DIAGNOSIS — M17.12 PRIMARY OSTEOARTHRITIS OF LEFT KNEE: Primary | ICD-10-CM

## 2020-02-13 RX ORDER — METHYLPREDNISOLONE ACETATE 40 MG/ML
40 INJECTION, SUSPENSION INTRA-ARTICULAR; INTRALESIONAL; INTRAMUSCULAR; SOFT TISSUE ONCE
Qty: 1 ML | Refills: 0
Start: 2020-02-13 | End: 2020-02-13

## 2020-02-13 NOTE — PROGRESS NOTES
HISTORY OF PRESENT ILLNESS  Mary Escobar III is a 53 y.o. male. Recurrent l knee pain and giving way,previously responding well to knee injections  Knee Pain    The history is provided by the patient. This is a chronic problem. The problem occurs daily. The problem has been gradually worsening. The pain is present in the left knee. The pain is at a severity of 6/10. The pain is moderate. Pertinent negatives include no back pain. Review of Systems   Constitutional: Negative for fever. Musculoskeletal: Positive for joint pain. Negative for back pain. Physical Exam  Constitutional:       Appearance: Normal appearance. He is obese. Musculoskeletal:      Left knee: He exhibits decreased range of motion, swelling and effusion. Tenderness found. Medial joint line tenderness noted. Neurological:      Mental Status: He is alert. ASSESSMENT and PLAN  Diagnoses and all orders for this visit:    1. Primary osteoarthritis of left knee  -     methylPREDNISolone acetate (DEPO-MEDROL) 40 mg/mL injection; 1 mL by IntraMUSCular route once for 1 dose. -     METHYLPREDNISOLONE ACETATE INJECTION 40 MG  -     PA DRAIN/INJECT LARGE JOINT/BURSA      Follow-up and Dispositions    · Return in about 3 months (around 5/13/2020). Indications:   Symptom relief from osteoarthritis, left knee    Procedure:  After consent was obtained,and procedure risks and benefits reviewed,using sterile technique the Left Knee Joint was prepped using Betadine. The joint was entered usinga 23 ga needle and 40 mg of Depo-Medrol and 1 ml Marcaine were injected without difficulty ,and the needle was withdrawn. The procedure was well tolerated,with negligile discomfort postprocedure. The patient was asked to continue to rest the joint fora few days before resuming normal activities. They were advised that there may be increased pain for the next 1-2 days,and to watch for fever,redness,or increased swelling or pain. They were advised to call if such symptoms develop. Joint Injection instruction sheet was given to the patient and reviewed. The patient departed in good condition

## 2020-02-13 NOTE — PROGRESS NOTES
Chief Complaint   Patient presents with    Knee Pain     Pt having L knee pain. 1. Have you been to the ER, urgent care clinic since your last visit? Hospitalized since your last visit? No    2. Have you seen or consulted any other health care providers outside of the [address] [place] [address] since your last visit? Include any pap smears or colon screening. No  [facility]  OFFICE PROCEDURE PROGRESS NOTE        Chart reviewed for the following:   Natalee Noriega, have reviewed the History, Physical and updated the Allergic reactions for [address] performed immediately prior to start of procedure:   Natalee Noriega, have performed the following reviews on FilmL[address] III prior to the start of the procedure:            * Patient was identified by name and date of birth   * Agreement on procedure being performed was verified  * Risks and Benefits explained to the patient  * Procedure site verified and marked as necessary  * Patient was positioned for comfort  * Consent was signed and verified     Time: 12:37 pm      Date of procedure: 2/13/2020    Procedure performed by: Tyra Arreaga MD    Provider assisted by:  N/A    Patient assisted by: N/A    How tolerated by patient: Well    Post Procedural Pain Scale: 7-10    Comments: Pt received left knee joint injection and tolerated it well.

## 2020-02-13 NOTE — PATIENT INSTRUCTIONS
Joint Injections: Care Instructions  Your Care Instructions    Joint injections are shots into a joint, such as the knee. They may be used to put in medicines, such as pain relievers. A corticosteroid, or steroid, shot is used to reduce inflammation in tendons or joints. It is often used to treat problems such as arthritis, tendinitis, and bursitis. Steroids can be injected directly into a painful, inflamed joint. They can also help reduce inflammation of a bursa. A bursa is a sac of fluid. It cushions and lubricates areas where tendons, ligaments, skin, muscles, or bones rub against each other. A steroid shot can sometimes help with short-term pain relief when other treatments haven't worked. If steroid shots help, pain may improve for weeks or months. Follow-up care is a key part of your treatment and safety. Be sure to make and go to all appointments, and call your doctor if you are having problems. It's also a good idea to know your test results and keep a list of the medicines you take. How can you care for yourself at home? · Put ice or a cold pack on the area for 10 to 20 minutes at a time. Put a thin cloth between the ice and your skin. · Ask your doctor if you can take an over-the-counter pain medicine, such as acetaminophen (Tylenol), ibuprofen (Advil, Motrin), or naproxen (Aleve). Be safe with medicines. Read and follow all instructions on the label. · Avoid strenuous activities for several days. In particular, avoid ones that put stress on the area where you got the shot. · If you have dressings over the area, keep them clean and dry. You may remove them when your doctor tells you to. When should you call for help? Call your doctor now or seek immediate medical care if:    · You have signs of infection, such as:  ? Increased pain, swelling, warmth, or redness. ? Red streaks leading from the site. ? Pus draining from the site.   ? A fever.    Watch closely for changes in your health, and be sure to contact your doctor if you have any problems. Where can you learn more? Go to http://elijah-reagan.info/. Enter N616 in the search box to learn more about \"Joint Injections: Care Instructions. \"  Current as of: June 26, 2019  Content Version: 12.2  © 5565-3268 [employer]. Care instructions adapted under license by [employer] (which disclaims liability or warranty for this information). If you have questions about a medical condition or this instruction, always ask your healthcare professional. [address] any warranty or liability for your use of this information. Joint Injections: Care Instructions  Your Care Instructions    Joint injections are shots into a joint, such as the knee. They may be used to put in medicines, such as pain relievers. A corticosteroid, or steroid, shot is used to reduce inflammation in tendons or joints. It is often used to treat problems such as arthritis, tendinitis, and bursitis. Steroids can be injected directly into a painful, inflamed joint. They can also help reduce inflammation of a bursa. A bursa is a sac of fluid. It cushions and lubricates areas where tendons, ligaments, skin, muscles, or bones rub against each other. A steroid shot can sometimes help with short-term pain relief when other treatments haven't worked. If steroid shots help, pain may improve for weeks or months. Follow-up care is a key part of your treatment and safety. Be sure to make and go to all appointments, and call your doctor if you are having problems. It's also a good idea to know your test results and keep a list of the medicines you take. How can you care for yourself at home? · Put ice or a cold pack on the area for 10 to 20 minutes at a time. Put a thin cloth between the ice and your skin.   · Ask your doctor if you can take an over-the-counter pain medicine, such as acetaminophen (Tylenol), ibuprofen (Advil, Motrin), or naproxen (Aleve). Be safe with medicines. Read and follow all instructions on the label. · Avoid strenuous activities for several days. In particular, avoid ones that put stress on the area where you got the shot. · If you have dressings over the area, keep them clean and dry. You may remove them when your doctor tells you to. When should you call for help? Call your doctor now or seek immediate medical care if:    · You have signs of infection, such as:  ? Increased pain, swelling, warmth, or redness. ? Red streaks leading from the site. ? Pus draining from the site. ? A fever.    Watch closely for changes in your health, and be sure to contact your doctor if you have any problems. Where can you learn more? Go to http://elijah-reagan.info/. Enter N616 in the search box to learn more about \"Joint Injections: Care Instructions. \"  Current as of: June 26, 2019  Content Version: 12.2  © 2324-1082 [employer]. Care instructions adapted under license by [employer] (which disclaims liability or warranty for this information). If you have questions about a medical condition or this instruction, always ask your healthcare professional. [place] [address] any warranty or liability for your use of this information. Joint Injections: Care Instructions  Your Care Instructions    Joint injections are shots into a joint, such as the knee. They may be used to put in medicines, such as pain relievers. A corticosteroid, or steroid, shot is used to reduce inflammation in tendons or joints. It is often used to treat problems such as arthritis, tendinitis, and bursitis. Steroids can be injected directly into a painful, inflamed joint. They can also help reduce inflammation of a bursa. A bursa is a sac of fluid. It cushions and lubricates areas where tendons, ligaments, skin, muscles, or bones rub against each other.   A steroid shot can sometimes help with short-term pain relief when other treatments haven't worked. If steroid shots help, pain may improve for weeks or months. Follow-up care is a key part of your treatment and safety. Be sure to make and go to all appointments, and call your doctor if you are having problems. It's also a good idea to know your test results and keep a list of the medicines you take. How can you care for yourself at home? · Put ice or a cold pack on the area for 10 to 20 minutes at a time. Put a thin cloth between the ice and your skin. · Ask your doctor if you can take an over-the-counter pain medicine, such as acetaminophen (Tylenol), ibuprofen (Advil, Motrin), or naproxen (Aleve). Be safe with medicines. Read and follow all instructions on the label. · Avoid strenuous activities for several days. In particular, avoid ones that put stress on the area where you got the shot. · If you have dressings over the area, keep them clean and dry. You may remove them when your doctor tells you to. When should you call for help? Call your doctor now or seek immediate medical care if:    · You have signs of infection, such as:  ? Increased pain, swelling, warmth, or redness. ? Red streaks leading from the site. ? Pus draining from the site. ? A fever.    Watch closely for changes in your health, and be sure to contact your doctor if you have any problems. Where can you learn more? Go to http://elijah-reagan.info/. Enter N616 in the search box to learn more about \"Joint Injections: Care Instructions. \"  Current as of: June 26, 2019  Content Version: 12.2  © 3128-8028 [employer]. Care instructions adapted under license by [employer] (which disclaims liability or warranty for this information).  If you have questions about a medical condition or this instruction, always ask your healthcare professional. [address] any warranty or liability for your use of this information. Joint Injections: Care Instructions  Your Care Instructions    Joint injections are shots into a joint, such as the knee. They may be used to put in medicines, such as pain relievers. A corticosteroid, or steroid, shot is used to reduce inflammation in tendons or joints. It is often used to treat problems such as arthritis, tendinitis, and bursitis. Steroids can be injected directly into a painful, inflamed joint. They can also help reduce inflammation of a bursa. A bursa is a sac of fluid. It cushions and lubricates areas where tendons, ligaments, skin, muscles, or bones rub against each other. A steroid shot can sometimes help with short-term pain relief when other treatments haven't worked. If steroid shots help, pain may improve for weeks or months. Follow-up care is a key part of your treatment and safety. Be sure to make and go to all appointments, and call your doctor if you are having problems. It's also a good idea to know your test results and keep a list of the medicines you take. How can you care for yourself at home? · Put ice or a cold pack on the area for 10 to 20 minutes at a time. Put a thin cloth between the ice and your skin. · Ask your doctor if you can take an over-the-counter pain medicine, such as acetaminophen (Tylenol), ibuprofen (Advil, Motrin), or naproxen (Aleve). Be safe with medicines. Read and follow all instructions on the label. · Avoid strenuous activities for several days. In particular, avoid ones that put stress on the area where you got the shot. · If you have dressings over the area, keep them clean and dry. You may remove them when your doctor tells you to. When should you call for help? Call your doctor now or seek immediate medical care if:    · You have signs of infection, such as:  ? Increased pain, swelling, warmth, or redness. ? Red streaks leading from the site. ? Pus draining from the site.   ? A fever.    Watch closely for changes in your health, and be sure to contact your doctor if you have any problems. Where can you learn more? Go to http://elijah-reagan.info/. Enter N616 in the search box to learn more about \"Joint Injections: Care Instructions. \"  Current as of: June 26, 2019  Content Version: 12.2  © 7695-0917 [employer], [employer]. Care instructions adapted under license by [employer] (which disclaims liability or warranty for this information). If you have questions about a medical condition or this instruction, always ask your healthcare professional. [address] any warranty or liability for your use of this information.

## 2020-04-02 ENCOUNTER — OFFICE VISIT (OUTPATIENT)
Dept: FAMILY MEDICINE CLINIC | Age: 55
End: 2020-04-02

## 2020-04-02 VITALS
OXYGEN SATURATION: 96 % | WEIGHT: 302.6 LBS | TEMPERATURE: 98 F | SYSTOLIC BLOOD PRESSURE: 132 MMHG | BODY MASS INDEX: 55.69 KG/M2 | HEIGHT: 62 IN | HEART RATE: 87 BPM | RESPIRATION RATE: 16 BRPM | DIASTOLIC BLOOD PRESSURE: 86 MMHG

## 2020-04-02 DIAGNOSIS — I10 ESSENTIAL HYPERTENSION, BENIGN: ICD-10-CM

## 2020-04-02 DIAGNOSIS — M79.671 RIGHT FOOT PAIN: Primary | ICD-10-CM

## 2020-04-02 DIAGNOSIS — M25.522 ELBOW PAIN, LEFT: ICD-10-CM

## 2020-04-02 DIAGNOSIS — E11.9 TYPE 2 DIABETES MELLITUS WITHOUT COMPLICATION, WITHOUT LONG-TERM CURRENT USE OF INSULIN (HCC): ICD-10-CM

## 2020-04-02 LAB
GLUCOSE POC: 185 MG/DL
HBA1C MFR BLD HPLC: 9.2 %

## 2020-04-02 RX ORDER — HYDROCODONE BITARTRATE AND ACETAMINOPHEN 5; 325 MG/1; MG/1
TABLET ORAL
COMMUNITY
Start: 2020-02-27 | End: 2022-08-16 | Stop reason: ALTCHOICE

## 2020-04-02 RX ORDER — INDOMETHACIN 50 MG/1
50 CAPSULE ORAL
Qty: 30 CAP | Refills: 2 | Status: SHIPPED | OUTPATIENT
Start: 2020-04-02 | End: 2020-04-12

## 2020-04-02 RX ORDER — BLOOD-GLUCOSE METER
EACH MISCELLANEOUS
COMMUNITY
Start: 2020-01-14 | End: 2020-06-26 | Stop reason: SDUPTHER

## 2020-04-02 RX ORDER — MELOXICAM 15 MG/1
TABLET ORAL
COMMUNITY
Start: 2020-02-01 | End: 2020-11-06 | Stop reason: ALTCHOICE

## 2020-04-02 RX ORDER — AMOXICILLIN 500 MG/1
CAPSULE ORAL
COMMUNITY
Start: 2020-03-18 | End: 2020-05-21 | Stop reason: ALTCHOICE

## 2020-04-02 RX ORDER — METHYLPREDNISOLONE 4 MG/1
TABLET ORAL
COMMUNITY
Start: 2020-02-27 | End: 2020-05-21 | Stop reason: ALTCHOICE

## 2020-04-02 RX ORDER — GLIMEPIRIDE 4 MG/1
4 TABLET ORAL
Qty: 30 TAB | Refills: 5 | Status: SHIPPED | OUTPATIENT
Start: 2020-04-02 | End: 2020-08-25

## 2020-04-02 NOTE — PROGRESS NOTES
HISTORY OF PRESENT ILLNESS  Marnie Mueller III is a 53 y.o. male. f/u rt grt toe pain treated at [facility] ,felt to likely be acute gout. Sx have subsided. Note made of chlorthalidone use for hbp.f/u DM2 HBP,OA,Chol  Foot Pain   The history is provided by the patient. This is a new problem. The problem occurs rarely. The problem has been resolved. Pertinent negatives include no chest pain. Hypertension    The history is provided by the patient. This is a chronic problem. The problem has not changed since onset. Associated symptoms include peripheral edema. Pertinent negatives include no chest pain, no palpitations and no malaise/fatigue. Blood sugar problem   The history is provided by the patient. This is a chronic problem. The problem occurs daily. Pertinent negatives include no chest pain. Review of Systems   Constitutional: Negative for fever and malaise/fatigue. Respiratory: Negative for cough. Cardiovascular: Negative for chest pain and palpitations. Gastrointestinal: Negative for blood in stool, constipation and melena. Genitourinary: Negative for dysuria, frequency and urgency. Musculoskeletal: Positive for joint pain. Physical Exam  Constitutional:       Appearance: Normal appearance. HENT:      Head: Normocephalic and atraumatic. Right Ear: Tympanic membrane normal.      Left Ear: Tympanic membrane normal.      Nose: Nose normal.      Mouth/Throat:      Mouth: Mucous membranes are moist.   Cardiovascular:      Rate and Rhythm: Normal rate and regular rhythm. Pulses: Normal pulses. Heart sounds: Normal heart sounds. Pulmonary:      Effort: Pulmonary effort is normal.      Breath sounds: Normal breath sounds. Abdominal:      General: Abdomen is flat. Palpations: Abdomen is soft. Musculoskeletal:      Left elbow: He exhibits decreased range of motion and swelling. Right foot: Normal.   Neurological:      General: No focal deficit present.       Mental Status: He is alert. ASSESSMENT and PLAN  Diagnoses and all orders for this visit:    1. Right foot pain,suspicious for gout,if urate level high will need to stop chlorthalidone  -     URIC ACID  -     indomethacin (INDOCIN) 50 mg capsule; Take 1 Cap by mouth three (3) times daily as needed for Gout or Pain for up to 10 days.  -     UT HANDLG&/OR CONVEY OF SPEC FOR TR OFFICE TO LAB    2. Type 2 diabetes mellitus without complication, without long-term current use of insulin (HCC),poor control,add glimiperide  -     METABOLIC PANEL, COMPREHENSIVE  -     AMB POC GLUCOSE, QUANTITATIVE, BLOOD  -     AMB POC HEMOGLOBIN A1C  -     glimepiride (AMARYL) 4 mg tablet; Take 1 Tab by mouth every morning.  -     UT HANDLG&/OR CONVEY OF SPEC FOR TR OFFICE TO LAB    3. Essential hypertension, benign,controlled  -     LIPID PANEL  -     UT HANDLG&/OR CONVEY OF SPEC FOR TR OFFICE TO LAB    4. Elbow pain, left ,improving  -     UT HANDLG&/OR CONVEY OF SPEC FOR TR OFFICE TO LAB      Follow-up and Dispositions    · Return in about 3 months (around 7/2/2020).

## 2020-04-02 NOTE — PROGRESS NOTES
Chief Complaint   Patient presents with    Foot Pain     Pt having pain in R foot. 1. Have you been to the ER, urgent care clinic since your last visit? Hospitalized since your last visit? No    2. Have you seen or consulted any other health care providers outside of the [address] [place] [address] since your last visit? Include any pap smears or colon screening.  No

## 2020-04-03 LAB
ALBUMIN SERPL-MCNC: 4.2 G/DL (ref 3.8–4.9)
ALBUMIN/GLOB SERPL: 1.4 {RATIO} (ref 1.2–2.2)
ALP SERPL-CCNC: 101 IU/L (ref 39–117)
ALT SERPL-CCNC: 14 IU/L (ref 0–44)
AST SERPL-CCNC: 21 IU/L (ref 0–40)
BILIRUB SERPL-MCNC: 0.3 MG/DL (ref 0–1.2)
BUN SERPL-MCNC: 10 MG/DL (ref 6–24)
BUN/CREAT SERPL: 11 (ref 9–20)
CALCIUM SERPL-MCNC: 10 MG/DL (ref 8.7–10.2)
CHLORIDE SERPL-SCNC: 100 MMOL/L (ref 96–106)
CHOLEST SERPL-MCNC: 213 MG/DL (ref 100–199)
CO2 SERPL-SCNC: 25 MMOL/L (ref 20–29)
CREAT SERPL-MCNC: 0.91 MG/DL (ref 0.76–1.27)
GLOBULIN SER CALC-MCNC: 2.9 G/DL (ref 1.5–4.5)
GLUCOSE SERPL-MCNC: 185 MG/DL (ref 65–99)
HDLC SERPL-MCNC: 45 MG/DL
INTERPRETATION, 910389: NORMAL
LDLC SERPL CALC-MCNC: 130 MG/DL (ref 0–99)
Lab: NORMAL
POTASSIUM SERPL-SCNC: 3.8 MMOL/L (ref 3.5–5.2)
PROT SERPL-MCNC: 7.1 G/DL (ref 6–8.5)
SODIUM SERPL-SCNC: 143 MMOL/L (ref 134–144)
TRIGL SERPL-MCNC: 189 MG/DL (ref 0–149)
URATE SERPL-MCNC: 7.6 MG/DL (ref 3.7–8.6)
VLDLC SERPL CALC-MCNC: 38 MG/DL (ref 5–40)

## 2020-05-06 DIAGNOSIS — M25.562 CHRONIC PAIN OF LEFT KNEE: ICD-10-CM

## 2020-05-06 DIAGNOSIS — G89.29 CHRONIC PAIN OF LEFT KNEE: ICD-10-CM

## 2020-05-06 RX ORDER — NAPROXEN 500 MG/1
TABLET ORAL
Qty: 180 TAB | Refills: 0 | Status: SHIPPED | OUTPATIENT
Start: 2020-05-06 | End: 2020-07-28

## 2020-05-21 ENCOUNTER — VIRTUAL VISIT (OUTPATIENT)
Dept: FAMILY MEDICINE CLINIC | Age: 55
End: 2020-05-21

## 2020-05-21 DIAGNOSIS — I10 ESSENTIAL HYPERTENSION, BENIGN: ICD-10-CM

## 2020-05-21 DIAGNOSIS — F51.01 PRIMARY INSOMNIA: ICD-10-CM

## 2020-05-21 DIAGNOSIS — E11.9 TYPE 2 DIABETES MELLITUS WITHOUT COMPLICATION, WITHOUT LONG-TERM CURRENT USE OF INSULIN (HCC): ICD-10-CM

## 2020-05-21 DIAGNOSIS — M54.50 ACUTE BILATERAL LOW BACK PAIN WITHOUT SCIATICA: Primary | ICD-10-CM

## 2020-05-21 RX ORDER — PREDNISONE 10 MG/1
10 TABLET ORAL DAILY
Qty: 5 TAB | Refills: 0 | Status: SHIPPED | OUTPATIENT
Start: 2020-05-21 | End: 2020-11-30 | Stop reason: ALTCHOICE

## 2020-05-21 RX ORDER — TRAZODONE HYDROCHLORIDE 100 MG/1
100 TABLET ORAL
Qty: 30 TAB | Refills: 2 | Status: SHIPPED | OUTPATIENT
Start: 2020-05-21 | End: 2020-11-30

## 2020-05-21 RX ORDER — TIZANIDINE 2 MG/1
2 TABLET ORAL
Qty: 30 TAB | Refills: 2 | Status: SHIPPED | OUTPATIENT
Start: 2020-05-21 | End: 2020-10-02 | Stop reason: SDUPTHER

## 2020-05-21 NOTE — PROGRESS NOTES
Chief Complaint   Patient presents with    Spasms     Pt state he is having back spasms. 1. Have you been to the ER, urgent care clinic since your last visit? Hospitalized since your last visit? No     2. Have you seen or consulted any other health care providers outside of the [address] [place] [address] since your last visit? Include any pap smears or colon screening.  No

## 2020-05-21 NOTE — PROGRESS NOTES
HISTORY OF PRESENT ILLNESS  Patient encounter by synchronous (real time) audio technology which is patient initiated. The Patient is aware that this encounter is a billable service with coverage determined by their insurance carrier,as discussed at the time of check in. The patient has given verbal consent to proceed    Sydney Cameron is a 53 y.o. male. Worsening low back pain with spasm for past few days. No apparent injury. No dysuria,hematuria,fever or chills. Continued poor sleep ,interupted,despite good sleep hygiene  Spasms   The history is provided by the patient. This is a recurrent problem. The problem occurs daily. The problem has been gradually worsening. Pertinent negatives include no chest pain, no abdominal pain, no headaches and no shortness of breath. Back Pain    The history is provided by the patient. This is a recurrent problem. The current episode started more than 2 days ago. The problem has been gradually worsening. The problem occurs daily. The pain is associated with no known injury. The pain is present in the lower back. The pain is at a severity of 5/10. The symptoms are aggravated by certain positions, twisting and bending. Pertinent negatives include no chest pain, no fever, no headaches, no abdominal pain, no dysuria, no paresthesias, no paresis and no tingling. Sleep Problem   The history is provided by the patient. This is a recurrent problem. The problem occurs daily. The problem has been gradually worsening. Pertinent negatives include no chest pain, no abdominal pain, no headaches and no shortness of breath. Review of Systems   Constitutional: Negative for chills, fever and malaise/fatigue. Respiratory: Negative for shortness of breath. Cardiovascular: Negative for chest pain and palpitations. Gastrointestinal: Negative for abdominal pain and constipation. Genitourinary: Negative for dysuria, frequency and urgency.    Musculoskeletal: Positive for back pain and muscle spasms. Skin: Negative for rash. Neurological: Negative for tingling, headaches and paresthesias. Psychiatric/Behavioral: The patient has insomnia. Physical Exam deferred    ASSESSMENT and PLAN  Diagnoses and all orders for this visit:    1. Acute bilateral low back pain without sciatica,recommend heat,rest  -     predniSONE (DELTASONE) 10 mg tablet; Take 10 mg by mouth daily. -     tiZANidine (ZANAFLEX) 2 mg tablet; Take 1 Tab by mouth three (3) times daily as needed for Muscle Spasm(s). 2. Essential hypertension, benign    3. Type 2 diabetes mellitus without complication, without long-term current use of insulin ([facility] [address]    4. Primary insomnia,recommend good sleep hygeine  -     traZODone (DESYREL) 100 mg tablet; Take 1 Tab by mouth nightly as needed for Sleep.

## 2020-05-22 ENCOUNTER — TELEPHONE (OUTPATIENT)
Dept: FAMILY MEDICINE CLINIC | Age: 55
End: 2020-05-22

## 2020-05-22 NOTE — TELEPHONE ENCOUNTER
Pt states he is to call back and leave a fax # for [facility]  [phone]  Attn : Kareen Preston, this is his office     Also mail to his home address

## 2020-05-22 NOTE — TELEPHONE ENCOUNTER
----- Message from Daniel Carpio sent at 5/22/2020  9:23 AM EDT -----  Regarding: DR Demarco Hurst / [place] Message     Pt is requesting to speak with nurse in regard to virtual visit 5/21/20    Callback required   Best contact number(s):[phone]              Daniel Carpio

## 2020-05-22 NOTE — TELEPHONE ENCOUNTER
Patient states that he take fluid pills and has frequent urine trips to the bathroom while at work, he needs a note stating that he is on a medication that causes frequent trips to the bathroom.  Telephone number [phone]

## 2020-06-26 RX ORDER — BLOOD-GLUCOSE METER
EACH MISCELLANEOUS
Qty: 1 EACH | Refills: 0 | Status: SHIPPED | OUTPATIENT
Start: 2020-06-26

## 2020-06-26 NOTE — TELEPHONE ENCOUNTER
Last Visit: 05/21/2020 with MD Vania Crow  Next Appointment: 07/02/2020 with MD Vania Crow    Requested Prescriptions     Pending Prescriptions Disp Refills    Accu-Chek Xochilt Plus Meter misc 1 Each 0     Sig: Test blood sugar daily before breakfast.

## 2020-07-28 DIAGNOSIS — G89.29 CHRONIC PAIN OF LEFT KNEE: ICD-10-CM

## 2020-07-28 DIAGNOSIS — M25.562 CHRONIC PAIN OF LEFT KNEE: ICD-10-CM

## 2020-07-28 RX ORDER — NAPROXEN 500 MG/1
TABLET ORAL
Qty: 180 TAB | Refills: 0 | Status: SHIPPED | OUTPATIENT
Start: 2020-07-28 | End: 2020-10-29

## 2020-08-13 RX ORDER — DILTIAZEM HYDROCHLORIDE 300 MG/1
CAPSULE, COATED, EXTENDED RELEASE ORAL
Qty: 90 CAP | Refills: 2 | Status: SHIPPED | OUTPATIENT
Start: 2020-08-13 | End: 2021-05-25

## 2020-08-24 DIAGNOSIS — E11.9 TYPE 2 DIABETES MELLITUS WITHOUT COMPLICATION, WITHOUT LONG-TERM CURRENT USE OF INSULIN (HCC): ICD-10-CM

## 2020-08-24 DIAGNOSIS — I10 ESSENTIAL HYPERTENSION, BENIGN: ICD-10-CM

## 2020-08-25 RX ORDER — LOSARTAN POTASSIUM 100 MG/1
TABLET ORAL
Qty: 90 TAB | Refills: 3 | Status: SHIPPED | OUTPATIENT
Start: 2020-08-25 | End: 2021-09-24

## 2020-08-25 RX ORDER — GLIMEPIRIDE 4 MG/1
TABLET ORAL
Qty: 90 TAB | Refills: 0 | Status: SHIPPED | OUTPATIENT
Start: 2020-08-25 | End: 2020-10-25

## 2020-10-02 DIAGNOSIS — M54.50 ACUTE BILATERAL LOW BACK PAIN WITHOUT SCIATICA: ICD-10-CM

## 2020-10-02 RX ORDER — TIZANIDINE 2 MG/1
2 TABLET ORAL
Qty: 30 TAB | Refills: 2 | Status: SHIPPED | OUTPATIENT
Start: 2020-10-02 | End: 2020-11-30

## 2020-10-02 NOTE — TELEPHONE ENCOUNTER
Pt wants a refill for muscle relaxer - neck and back spasms     Doesn't remember the name     [facility]       Best number to reach him is [phone]

## 2020-10-18 ENCOUNTER — APPOINTMENT (OUTPATIENT)
Dept: GENERAL RADIOLOGY | Age: 55
End: 2020-10-18
Attending: EMERGENCY MEDICINE
Payer: COMMERCIAL

## 2020-10-18 ENCOUNTER — HOSPITAL ENCOUNTER (EMERGENCY)
Age: 55
Discharge: HOME | End: 2020-10-18
Attending: EMERGENCY MEDICINE
Payer: COMMERCIAL

## 2020-10-18 VITALS
DIASTOLIC BLOOD PRESSURE: 81 MMHG | TEMPERATURE: 97.9 F | WEIGHT: 298 LBS | RESPIRATION RATE: 23 BRPM | OXYGEN SATURATION: 93 % | HEIGHT: 64 IN | HEART RATE: 65 BPM | BODY MASS INDEX: 50.88 KG/M2 | SYSTOLIC BLOOD PRESSURE: 148 MMHG

## 2020-10-18 DIAGNOSIS — R06.02 SOB (SHORTNESS OF BREATH): Primary | ICD-10-CM

## 2020-10-18 DIAGNOSIS — R51.9 NONINTRACTABLE EPISODIC HEADACHE, UNSPECIFIED HEADACHE TYPE: ICD-10-CM

## 2020-10-18 LAB
ANION GAP SERPL CALC-SCNC: 8 MMOL/L (ref 5–15)
BASOPHILS # BLD: 0 K/UL (ref 0–0.1)
BASOPHILS NFR BLD: 0 % (ref 0–1)
BNP SERPL-MCNC: 343 PG/ML
BUN SERPL-MCNC: 10 MG/DL (ref 6–20)
BUN/CREAT SERPL: 9 (ref 12–20)
CALCIUM SERPL-MCNC: 9.1 MG/DL (ref 8.5–10.1)
CHLORIDE SERPL-SCNC: 105 MMOL/L (ref 97–108)
CO2 SERPL-SCNC: 27 MMOL/L (ref 21–32)
COMMENT, HOLDF: NORMAL
CREAT SERPL-MCNC: 1.15 MG/DL (ref 0.7–1.3)
D DIMER PPP FEU-MCNC: 0.51 MG/L FEU (ref 0–0.65)
DIFFERENTIAL METHOD BLD: ABNORMAL
EOSINOPHIL # BLD: 0.1 K/UL (ref 0–0.4)
EOSINOPHIL NFR BLD: 1 % (ref 0–7)
ERYTHROCYTE [DISTWIDTH] IN BLOOD BY AUTOMATED COUNT: 15.9 % (ref 11.5–14.5)
GLUCOSE SERPL-MCNC: 156 MG/DL (ref 65–100)
HCT VFR BLD AUTO: 40.8 % (ref 36.6–50.3)
HGB BLD-MCNC: 13.3 G/DL (ref 12.1–17)
IMM GRANULOCYTES # BLD AUTO: 0.1 K/UL (ref 0–0.04)
IMM GRANULOCYTES NFR BLD AUTO: 1 % (ref 0–0.5)
LYMPHOCYTES # BLD: 2 K/UL (ref 0.8–3.5)
LYMPHOCYTES NFR BLD: 16 % (ref 12–49)
MAGNESIUM SERPL-MCNC: 2 MG/DL (ref 1.6–2.4)
MCH RBC QN AUTO: 27.4 PG (ref 26–34)
MCHC RBC AUTO-ENTMCNC: 32.6 G/DL (ref 30–36.5)
MCV RBC AUTO: 84.1 FL (ref 80–99)
MONOCYTES # BLD: 0.9 K/UL (ref 0–1)
MONOCYTES NFR BLD: 7 % (ref 5–13)
NEUTS SEG # BLD: 9.8 K/UL (ref 1.8–8)
NEUTS SEG NFR BLD: 75 % (ref 32–75)
NRBC # BLD: 0 K/UL (ref 0–0.01)
NRBC BLD-RTO: 0 PER 100 WBC
PLATELET # BLD AUTO: 289 K/UL (ref 150–400)
PMV BLD AUTO: 11.1 FL (ref 8.9–12.9)
POTASSIUM SERPL-SCNC: 3.4 MMOL/L (ref 3.5–5.1)
RBC # BLD AUTO: 4.85 M/UL (ref 4.1–5.7)
SAMPLES BEING HELD,HOLD: NORMAL
SODIUM SERPL-SCNC: 140 MMOL/L (ref 136–145)
TROPONIN I SERPL-MCNC: <0.05 NG/ML
WBC # BLD AUTO: 12.9 K/UL (ref 4.1–11.1)

## 2020-10-18 PROCEDURE — 80048 BASIC METABOLIC PNL TOTAL CA: CPT

## 2020-10-18 PROCEDURE — 85379 FIBRIN DEGRADATION QUANT: CPT

## 2020-10-18 PROCEDURE — 85025 COMPLETE CBC W/AUTO DIFF WBC: CPT

## 2020-10-18 PROCEDURE — 36415 COLL VENOUS BLD VENIPUNCTURE: CPT

## 2020-10-18 PROCEDURE — 71046 X-RAY EXAM CHEST 2 VIEWS: CPT

## 2020-10-18 PROCEDURE — 83735 ASSAY OF MAGNESIUM: CPT

## 2020-10-18 PROCEDURE — 84484 ASSAY OF TROPONIN QUANT: CPT

## 2020-10-18 PROCEDURE — 93005 ELECTROCARDIOGRAM TRACING: CPT

## 2020-10-18 PROCEDURE — 83880 ASSAY OF NATRIURETIC PEPTIDE: CPT

## 2020-10-18 PROCEDURE — 74011250637 HC RX REV CODE- 250/637: Performed by: EMERGENCY MEDICINE

## 2020-10-18 PROCEDURE — 99285 EMERGENCY DEPT VISIT HI MDM: CPT

## 2020-10-18 RX ORDER — ZIPRASIDONE MESYLATE 20 MG/ML
10 INJECTION, POWDER, LYOPHILIZED, FOR SOLUTION INTRAMUSCULAR
Status: DISCONTINUED | OUTPATIENT
Start: 2020-10-18 | End: 2020-10-18

## 2020-10-18 RX ORDER — ACETAMINOPHEN 500 MG
1000 TABLET ORAL
Status: COMPLETED | OUTPATIENT
Start: 2020-10-18 | End: 2020-10-18

## 2020-10-18 RX ORDER — NAPROXEN 250 MG/1
375 TABLET ORAL
Status: COMPLETED | OUTPATIENT
Start: 2020-10-18 | End: 2020-10-18

## 2020-10-18 RX ADMIN — ACETAMINOPHEN 1000 MG: 500 TABLET ORAL at 23:00

## 2020-10-18 RX ADMIN — NAPROXEN 375 MG: 250 TABLET ORAL at 23:00

## 2020-10-19 ENCOUNTER — TELEPHONE (OUTPATIENT)
Dept: FAMILY MEDICINE CLINIC | Age: 55
End: 2020-10-19

## 2020-10-19 ENCOUNTER — PATIENT OUTREACH (OUTPATIENT)
Dept: CASE MANAGEMENT | Age: 55
End: 2020-10-19

## 2020-10-19 LAB
ATRIAL RATE: 65 BPM
CALCULATED P AXIS, ECG09: 43 DEGREES
CALCULATED R AXIS, ECG10: 48 DEGREES
CALCULATED T AXIS, ECG11: 78 DEGREES
DIAGNOSIS, 93000: NORMAL
P-R INTERVAL, ECG05: 128 MS
Q-T INTERVAL, ECG07: 430 MS
QRS DURATION, ECG06: 84 MS
QTC CALCULATION (BEZET), ECG08: 447 MS
VENTRICULAR RATE, ECG03: 65 BPM

## 2020-10-19 NOTE — PROGRESS NOTES
Patient contacted regarding recent discharge and COVID-19 risk. Discussed COVID-19 related testing which was not done at this time. Test results were not done. Patient informed of results, if available? no    Care Transition Nurse/ Ambulatory Care Manager/ LPN Care Coordinator contacted the patient by telephone to perform post discharge assessment. Verified name and  with patient as identifiers. Patient has following risk factors of: diabetes. CTN/ACM/LPN reviewed discharge instructions, medical action plan and red flags related to discharge diagnosis. Reviewed and educated them on any new and changed medications related to discharge diagnosis. Advised obtaining a 90-day supply of all daily and as-needed medications. Advance Care Planning:   Does patient have an Advance Directive: will address during future CCM call    Education provided regarding infection prevention, and signs and symptoms of COVID-19 and when to seek medical attention with patient who verbalized understanding. Discussed exposure protocols and quarantine from [address] you at higher risk for severe illness  and given an opportunity for questions and concerns. The patient agrees to contact the COVID-19 hotline [phone] or PCP office for questions related to their healthcare. CTN/ACM/LPN provided contact information for future reference. From CDC: Are you at higher risk for severe illness?  Wash your hands often.  Avoid close contact (6 feet, which is about two arm lengths) with people who are sick.  Put distance between yourself and other people if COVID-19 is spreading in your community.  Clean and disinfect frequently touched surfaces.  Avoid all cruise travel and non-essential air travel.  Call your healthcare professional if you have concerns about COVID-19 and your underlying condition or if you are sick.     For more information on steps you can take to protect yourself, see CDC's How to Protect Yourself      Patient/family/caregiver given information for Get[facility] Loop and agrees to enroll no  Patient's preferred e-mail:  n/a  Patient's preferred phone number: n/a  Based on Loop alert triggers, patient will be contacted by nurse care manager for worsening symptoms. Plan for follow-up call in 7-14 days based on severity of symptoms and risk factors. Also enrolling patient in CCM services.

## 2020-10-19 NOTE — ED PROVIDER NOTES
HPI .  Patient has a history of diabetes, hypertension, and hyperlipidemia. He reports that he had bladder cancer but had a transurethral resection which he says was curative. He is still followed by a urologist.  Patient has sleep apnea and reports that he is compliant with his sleep apnea device and that it seems to be working fine. Patient reports that he has had similar episodes of shortness of breath in the past and no one's been able to figure out what the problem is. He reports having migraine headaches for the last 2 days. Patient is having minimal chest pain that does not seem to correlate with the shortness of breath. Shortness of breath episodes last 30 minutes at times. They seem to be set off by lying flat or exertion. Patient denies recent ankle edema. He denies history of myocardial infarction and heart failure.     Past Medical History:   Diagnosis Date    [facility] [address] [facility]     Diabetes ([facility] [address] [facility]     ED (erectile dysfunction) 6/15/2010    Encounter for long-term (current) use of other medications 4/10/2011    Essential hypertension, benign 6/15/2010    Family history of colon cancer 2/17/2012    GERD (gastroesophageal reflux disease) 6/15/2010    Headache(784.0)     Hypertension     Migraine syndrome 6/15/2010    Mixed hyperlipidemia 4/10/2011    WESTLEY (obstructive sleep apnea) 6/15/2010       Past Surgical History:   Procedure Laterality Date    COLONOSCOPY N/A 6/7/2016    COLONOSCOPY performed by Cathie Barlow MD at . [facility] [address] [facility]  6/7/2016         HX COLONOSCOPY      HX UROLOGICAL           Family History:   Problem Relation Age of Onset    Diabetes Mother     Stroke Mother     Hypertension Mother     Cancer Father     Diabetes Father        Social History     Socioeconomic History    Marital status:      Spouse name: Not on file    Number of children: Not on file    Years of education: Not on file    Highest education level: Not on file   Occupational History    Not on file   Social Needs    Financial resource strain: Not on file    Food insecurity     Worry: Not on file     Inability: Not on file    Transportation needs     Medical: Not on file     Non-medical: Not on file   Tobacco Use    Smoking status: Never Smoker    Smokeless tobacco: Never Used   Substance and Sexual Activity    Alcohol use: Yes     Alcohol/week: 1.7 standard drinks     Types: 2 Cans of beer per week    Drug use: No    Sexual activity: Yes     Partners: Female   Lifestyle    Physical activity     Days per week: Not on file     Minutes per session: Not on file    Stress: Not on file   Relationships    Social connections     Talks on phone: Not on file     Gets together: Not on file     Attends [employer] service: Not on file     Active member of club or organization: Not on file     Attends meetings of clubs or organizations: Not on file     Relationship status: Not on file    Intimate partner violence     Fear of current or ex partner: Not on file     Emotionally abused: Not on file     Physically abused: Not on file     Forced sexual activity: Not on file   Other Topics Concern    Not on file   Social History Narrative    Not on file         ALLERGIES: Ramipril    Review of Systems   Constitutional: Negative for appetite change and fever. HENT: Negative for congestion. Eyes: Negative for redness. Respiratory: Positive for shortness of breath. Negative for cough. Endocrine: Negative for polyuria. Genitourinary: Negative for difficulty urinating. Psychiatric/Behavioral: Negative for agitation and behavioral problems. The patient is not nervous/anxious. Vitals:    10/18/20 2025   BP: (!) 173/80   Pulse: 74   Resp: 20   Temp: 97.9 °F (36.6 °C)   SpO2: 95%   Weight: 135.2 kg (298 lb)   Height: 5' 4\" (1.626 m)            Physical Exam  Vitals signs and nursing note reviewed. Constitutional:       Appearance: He is obese.    HENT: Head: Normocephalic and atraumatic. Eyes:      Pupils: Pupils are equal, round, and reactive to light. Neck:      Musculoskeletal: Normal range of motion and neck supple. Cardiovascular:      Rate and Rhythm: Normal rate and regular rhythm. Heart sounds: Normal heart sounds. No murmur. No friction rub. No gallop. Pulmonary:      Effort: Pulmonary effort is normal. No respiratory distress. Breath sounds: No wheezing or rales. Abdominal:      Palpations: Abdomen is soft. Tenderness: There is no abdominal tenderness. There is no rebound. Musculoskeletal: Normal range of motion. General: No tenderness. Skin:     Findings: No erythema. Neurological:      Mental Status: He is alert. Cranial Nerves: No cranial nerve deficit. Comments: Motor; symmetric   Psychiatric:         Behavior: Behavior normal.          MDM       Procedures             ED EKG interpretation:  Rhythm: normal sinus rhythm; and regular . Rate (approx.): 65; Axis: normal; P wave: normal; QRS interval: normal ; ST/T wave: normal; in  Lead: ; Other findings: . This EKG was interpreted by Terri Epps MD,ED Provider. 9:08 PM          Note: Patient wanted some headache medicine. I ordered Reglan. I went back to talk to him and he does not have an IV at present and does not want an IV restarted. He would rather have p.o. medications. Work-up for shortness of breath is completely negative. He is morbidly obese. Patient will follow up with his primary care doctor.   Terri Epps MD  10:46 PM

## 2020-10-19 NOTE — DISCHARGE INSTRUCTIONS

## 2020-10-19 NOTE — ED TRIAGE NOTES
Pt presents to ED with cc of SOB, chest pain that started last night and a migraine that started a couple of days ago. Pt reports that the chest pain is intermittent and he gets hot when it comes on.  Denies cardiac hx

## 2020-10-19 NOTE — TELEPHONE ENCOUNTER
Patient states he went to ER for chest pain, sweating, SOB, all test  were normal , has an appointment to be checked for Covid on Wednesday. He will call office back if appointment is needed due to symptoms after test results.

## 2020-10-19 NOTE — TELEPHONE ENCOUNTER
----- Message from [facility] sent at 10/19/2020 12:51 PM EDT -----  Regarding: MD Godwin/Telephone  Level 1/Escalated Issue      Caller's first and last name and relationship (if not the patient): Self. Best contact number(s): [phone]      What are the symptoms: Chest pain, hard to breathe, headaches, sweating, anxiety      Transfer successful - yes/no (include outcome): No answer. Transfer declined - yes/no (include reason): NA. Was caller advised to seek appropriate level of care - yes/no: Yes. Details to clarify the request: Pt called claiming they went to the ER yesterday evening for chest pain, hard to breathe, headaches, sweating with no fever, and anxiety. Had EKG and Xrays that came back as negative and no sign of any health issues. Pt is still experiencing all symptoms throughout the day, says symptoms \"come and go\", usually when he is moving around a lot. Last symptoms happened less than 1 hour ago.         [facility]

## 2020-10-25 DIAGNOSIS — E11.9 TYPE 2 DIABETES MELLITUS WITHOUT COMPLICATION, WITHOUT LONG-TERM CURRENT USE OF INSULIN (HCC): ICD-10-CM

## 2020-10-25 RX ORDER — GLIMEPIRIDE 4 MG/1
TABLET ORAL
Qty: 90 TAB | Refills: 0 | Status: SHIPPED | OUTPATIENT
Start: 2020-10-25 | End: 2021-02-25

## 2020-10-25 RX ORDER — BLOOD SUGAR DIAGNOSTIC
STRIP MISCELLANEOUS
Qty: 100 STRIP | Refills: 1 | Status: SHIPPED | OUTPATIENT
Start: 2020-10-25 | End: 2021-06-20

## 2020-10-29 DIAGNOSIS — G89.29 CHRONIC PAIN OF LEFT KNEE: ICD-10-CM

## 2020-10-29 DIAGNOSIS — M25.562 CHRONIC PAIN OF LEFT KNEE: ICD-10-CM

## 2020-10-29 RX ORDER — NAPROXEN 500 MG/1
TABLET ORAL
Qty: 180 TAB | Refills: 0 | Status: SHIPPED | OUTPATIENT
Start: 2020-10-29 | End: 2020-11-06 | Stop reason: ALTCHOICE

## 2020-11-04 ENCOUNTER — TELEPHONE (OUTPATIENT)
Dept: FAMILY MEDICINE CLINIC | Age: 55
End: 2020-11-04

## 2020-11-04 NOTE — TELEPHONE ENCOUNTER
----- Message from Paulo Gannon sent at 11/4/2020  1:56 PM EST -----  Regarding: MD Godwin/Telephone  Appointment not available    Caller's first and last name and relationship to patient (if not the patient): Self. Best contact number: [phone]      Preferred date and time: Any day, mornings pref. Scheduled appointment date and time: 11/30 at 9A. Reason for appointment: Fasting CPE. Details to clarify the request: Pt scheduled for fasting CPE on 11/30 and would like to be notified if anything else opens up sooner.       Paulo Gannon

## 2020-11-04 NOTE — TELEPHONE ENCOUNTER
----- Message from Erika Luna sent at 11/4/2020  1:55 PM EST -----  Regarding: MD Godwin/Telephone  General Message/Vendor Calls    Caller's first and last name: Self. Reason for call: FMLA Paperwork. Callback required yes/no and why: Yes. Best contact number(s): [phone]      Details to clarify the request: Has [place] that needs to be filled out and returned to employer.       Erika Luna

## 2020-11-04 NOTE — TELEPHONE ENCOUNTER
Advised patient to drop off FMLA form , office will look at form to see if we can fill out before appointment

## 2020-11-05 ENCOUNTER — PATIENT OUTREACH (OUTPATIENT)
Dept: CASE MANAGEMENT | Age: 55
End: 2020-11-05

## 2020-11-05 NOTE — PROGRESS NOTES
Ambulatory Care Management Note    Date/Time:  11/5/2020 12:50 PM    This Ambulatory Care Manager (ACM) reviewed and updated the following screenings during this call; general assessment. Patient's challenges to self management identified:   lack of knowledge about disease      Medication Management:  good adherence and poor understanding    Advance Care Planning:   Does patient have an Advance Directive:  not on file; will address during future outreach    [employer], Referrals, and Durable Medical Equipment: none      Health Maintenance Due   Topic Date Due    Pneumococcal 0-64 years (1 of 1 - PPSV23) 09/20/1970    Eye Exam Retinal or Dilated  09/20/1974    Shingrix Vaccine Age 50> (1 of 2) 09/20/2014    A1C test (Diabetic or Prediabetic)  07/02/2020    Foot Exam Q1  08/13/2020    MICROALBUMIN Q1  08/13/2020    Flu Vaccine (1) 09/01/2020     Health Maintenance reviewed - reviewed items due. Patient will address at upcoming PCP appointment on 11/30/20. Patient was asked to consider health care goals that they would like to focus on with this ACM. ACM will follow up with patient to discuss goals and establish care plan after PCP appointment on 11/30/20.        PCP/Specialist follow up:   Future Appointments   Date Time Provider Ly Mac   11/30/2020  9:00 AM Linda Head MD [facility] BS AMB

## 2020-11-06 ENCOUNTER — OFFICE VISIT (OUTPATIENT)
Dept: FAMILY MEDICINE CLINIC | Age: 55
End: 2020-11-06
Payer: COMMERCIAL

## 2020-11-06 VITALS
HEART RATE: 88 BPM | HEIGHT: 64 IN | SYSTOLIC BLOOD PRESSURE: 137 MMHG | BODY MASS INDEX: 51.8 KG/M2 | WEIGHT: 303.4 LBS | DIASTOLIC BLOOD PRESSURE: 84 MMHG | TEMPERATURE: 98 F | OXYGEN SATURATION: 98 % | RESPIRATION RATE: 16 BRPM

## 2020-11-06 DIAGNOSIS — M19.91 PRIMARY LOCALIZED OSTEOARTHROSIS: Primary | ICD-10-CM

## 2020-11-06 PROCEDURE — 99213 OFFICE O/P EST LOW 20 MIN: CPT | Performed by: STUDENT IN AN ORGANIZED HEALTH CARE EDUCATION/TRAINING PROGRAM

## 2020-11-06 PROCEDURE — 96372 THER/PROPH/DIAG INJ SC/IM: CPT | Performed by: STUDENT IN AN ORGANIZED HEALTH CARE EDUCATION/TRAINING PROGRAM

## 2020-11-06 RX ORDER — DICLOFENAC SODIUM 75 MG/1
75 TABLET, DELAYED RELEASE ORAL 2 TIMES DAILY
Qty: 30 TAB | Refills: 0 | Status: SHIPPED | OUTPATIENT
Start: 2020-11-06 | End: 2021-08-25 | Stop reason: ALTCHOICE

## 2020-11-06 RX ORDER — TRIAMCINOLONE ACETONIDE 40 MG/ML
40 INJECTION, SUSPENSION INTRA-ARTICULAR; INTRAMUSCULAR ONCE
Qty: 1 ML | Refills: 0
Start: 2020-11-06 | End: 2020-11-06

## 2020-11-06 RX ORDER — LIDOCAINE HYDROCHLORIDE 10 MG/ML
2 INJECTION, SOLUTION EPIDURAL; INFILTRATION; INTRACAUDAL; PERINEURAL ONCE
Qty: 2 ML | Refills: 0
Start: 2020-11-06 | End: 2020-11-06

## 2020-11-06 NOTE — PROGRESS NOTES
[address]  [address] [facility], [address]  [phone]    C/C: Knee pain, right    HPI:  Ayesha Rushing is a 63 y.o. male who presents with right  knee pain. Knee Pain, right     The pt complains of right knee pain. Onset was gradual.  The patient has had activity dependent pain that started years ago. The patient has tried activity modification and injections in the past significant relief. The pain is in the anterior and lateral aspect of the right knee and sometimes 10/10 in intensity when at its worst.  The patient feels unstable with the knee,  and has significant limitation with activities of daily living, recreation, and walks with a limp. Of note, pt also has left knee pain over the years and have received injections in both knees, the most recent was about 9 months ago on the left knee which helped.      Past Medical History:   Diagnosis Date    [facility] ([facility] [address]     Diabetes ([facility] [address]     ED (erectile dysfunction) 6/15/2010    Encounter for long-term (current) use of other medications 4/10/2011    Essential hypertension, benign 6/15/2010    Family history of colon cancer 2/17/2012    GERD (gastroesophageal reflux disease) 6/15/2010    Headache(784.0)     Hypertension     Migraine syndrome 6/15/2010    Mixed hyperlipidemia 4/10/2011    WESTLEY (obstructive sleep apnea) 6/15/2010       Current Outpatient Medications:     lidocaine, PF, (XYLOCAINE) 10 mg/mL (1 %) injection, 2 mL by IntraVENous route once for 1 dose., Disp: 2 mL, Rfl: 0    triamcinolone acetonide (Kenalog) 40 mg/mL injection, 1 mL by Intra artICUlar route once for 1 dose., Disp: 1 mL, Rfl: 0    diclofenac EC (VOLTAREN) 75 mg EC tablet, Take 1 Tab by mouth two (2) times a day., Disp: 30 Tab, Rfl: 0    glimepiride (AMARYL) 4 mg tablet, TAKE 1 TABLET BY MOUTH EVERY DAY IN THE MORNING, Disp: 90 Tab, Rfl: 0    Accu-Chek Xochilt Plus test strp strip, USE TO TEST BLOOD DAILY (BEFORE BREAKFAST). , Disp: 100 Strip, Rfl: 1    losartan (COZAAR) 100 mg tablet, TAKE 1 TABLET BY MOUTH EVERY DAY, Disp: 90 Tab, Rfl: 3    Accu-Chek Xochilt Plus Meter misc, Test blood sugar daily before breakfast., Disp: 1 Each, Rfl: 0    Blood-Glucose Meter monitoring kit, As directed,daily,fasting, Disp: 1 Kit, Rfl: 0    glucose blood VI test strips (BLOOD GLUCOSE TEST) strip, by Does Not Apply route Daily (before breakfast). , Disp: 100 Strip, Rfl: 2    lancets (ONE TOUCH DELICA) 35 gauge misc, by Does Not Apply route daily. Check blood sugars once daily, Disp: 100 Lancet, Rfl: 3    CIALIS 20 mg tablet, TAKE 1 TAB BY MOUTH AS NEEDED., Disp: 8 Tab, Rfl: 8    glucose blood VI test strips (ONETOUCH VERIO) strip, by Does Not Apply route daily. Check blood sugars once daily, Disp: 50 Strip, Rfl: 11    tiZANidine (ZANAFLEX) 2 mg tablet, Take 1 Tab by mouth three (3) times daily as needed for Muscle Spasm(s). , Disp: 30 Tab, Rfl: 2    dilTIAZem ER (CARDIZEM CD) 300 mg capsule, TAKE 1 CAPSULE BY MOUTH EVERY DAY, Disp: 90 Cap, Rfl: 2    predniSONE (DELTASONE) 10 mg tablet, Take 10 mg by mouth daily. , Disp: 5 Tab, Rfl: 0    traZODone (DESYREL) 100 mg tablet, Take 1 Tab by mouth nightly as needed for Sleep., Disp: 30 Tab, Rfl: 2    HYDROcodone-acetaminophen (NORCO) 5-325 mg per tablet, TAKE ONE TABLET BY MOUTH 3 TIMES PER DAY FOR 2 DAYS, Disp: , Rfl:     metFORMIN ER (GLUCOPHAGE XR) 500 mg tablet, Take 2 Tabs by mouth daily (with dinner). , Disp: 180 Tab, Rfl: 1    chlorthalidone (HYGROTEN) 25 mg tablet, TAKE 1 TABLET BY MOUTH EVERY DAY, Disp: 90 Tab, Rfl: 3    butalbital-acetaminophen (PHRENILIN)  mg tablet, Take 1 Tab by mouth every six (6) hours as needed.  (Patient not taking: Reported on 10/24/2019), Disp: 30 Tab, Rfl: 2  Allergies   Allergen Reactions    Ramipril Cough     Past Medical History:   Diagnosis Date    [facility] ([facility] [address]     [email or web address]     ED (erectile dysfunction) 6/15/2010    Encounter for long-term (current) use of other medications 4/10/2011    Essential hypertension, benign 6/15/2010    Family history of colon cancer 2/17/2012    GERD (gastroesophageal reflux disease) 6/15/2010    Headache(784.0)     Hypertension     Migraine syndrome 6/15/2010    Mixed hyperlipidemia 4/10/2011    WESTLEY (obstructive sleep apnea) 6/15/2010     Family History   Problem Relation Age of Onset    Diabetes Mother     Stroke Mother     Hypertension Mother     Cancer Father     Diabetes Father        ROS:     General/Constitutional:  No fever, chills, sweats, fatigue, night sweats, weakness, weight loss or weight gain   Head: No headache, no trauma   Neck: No swelling, masses, stiffness, pain, or limited movement   Cardiac: No chest pain   Respiratory: No cough, shortness of breath, or dyspnea on exertion   Peripheral Vascular: No edema, coldness, numbness, discoloration, pain, or paresthesias   Musculoskeletal: Positive for right pain. Objective:   Visit Vitals  /84 (BP 1 Location: Left arm, BP Patient Position: Sitting)   Pulse 88   Temp 98 °F (36.7 °C) (Temporal)   Resp 16   Ht 5' 4\" (1.626 m)   Wt 303 lb 6.4 oz (137.6 kg)   SpO2 98%   BMI 52.08 kg/m²     Gen: Well appearing. No apparent distress. Alert and oriented. Responds to all questions appropriately. Lungs: No labored respirations. Talking in complete sentences without difficulty. Musculoskeletal: FROM of motion in the right knee with pain  Neuro/Vascular : Pulses intact, no edema, and neurologically intact . Skin: No obvious rash or skin breakdown. ASSESSMENT:    ICD-10-CM ICD-9-CM    1. Primary localized osteoarthrosis  M19.91 715.10 TRIAMCINOLONE ACETONIDE INJ      diclofenac EC (VOLTAREN) 75 mg EC tablet       Knee osteoarthritis: This patient and I did discussed the many options in treating OA of the knee .   We did discuss that we could continue to seek out nonoperative modalities, such as: NSAIDs, oral and topical analgesics, joint injections, physical therapy, weight loss strategies, stretching, strengthening, and activity modification. The patient stated their understanding with this and would like to proceed with nonsurgical management as listed below;    - TRIAMCINOLONE ACETONIDE INJ  - diclofenac EC (VOLTAREN) 75 mg EC tablet; Take 1 Tab by mouth two (2) times a day. Dispense: 30 Tab; Refill: 0  - Home exercises per handout    Procedure:  After consent was obtained, using sterile technique the injection site was prepped using betadine. 1cc of 40 mg kenalog was mixed with 1cc of 1% Lidocaine in  a 5 cc syringe and injected into the joint and the needle withdrawn. The procedure was well tolerated without complication and a bandage was applied. The patient was asked to continue to rest the joint for a few more days before resuming regular activities and to use ice, NSAIDs or Tylenol for pain as needed. It may be more painful for the first 1-2 days. Watch for allergic reactions, fever, or increased swelling or persistent pain in the joint. Call or return to clinic prn if such symptoms occur or there is failure to improve as anticipated. Follow up as needed    We discussed the expected course, resolution and complications of the diagnosis(es) in detail. Medication risks, benefits, costs, interactions, and alternatives were discussed as indicated. I advised to contact the office if his condition worsens, changes or fails to improve as anticipated. Pt expressed understanding with the diagnosis(es) and plan. Patient understands that this encounter was a temporary measure, and the importance of further follow up and examination was emphasized. Patient verbalized understanding.       Signed By: Jackie Aguayo MD     November 6, 2020

## 2020-11-06 NOTE — PATIENT INSTRUCTIONS
Joint Injections: Care Instructions  Your Care Instructions     Joint injections are shots into a joint, such as the knee. They may be used to put in medicines, such as pain relievers. A corticosteroid, or steroid, shot is used to reduce inflammation in tendons or joints. It is often used to treat problems such as arthritis, tendinitis, and bursitis. Steroids can be injected directly into a painful, inflamed joint. They can also help reduce inflammation of a bursa. A bursa is a sac of fluid. It cushions and lubricates areas where tendons, ligaments, skin, muscles, or bones rub against each other. A steroid shot can sometimes help with short-term pain relief when other treatments haven't worked. If steroid shots help, pain may improve for weeks or months. Follow-up care is a key part of your treatment and safety. Be sure to make and go to all appointments, and call your doctor if you are having problems. It's also a good idea to know your test results and keep a list of the medicines you take. How can you care for yourself at home? · Put ice or a cold pack on the area for 10 to 20 minutes at a time. Put a thin cloth between the ice and your skin. · Ask your doctor if you can take an over-the-counter pain medicine, such as acetaminophen (Tylenol), ibuprofen (Advil, Motrin), or naproxen (Aleve). Be safe with medicines. Read and follow all instructions on the label. · Avoid strenuous activities for several days. In particular, avoid ones that put stress on the area where you got the shot. · If you have dressings over the area, keep them clean and dry. You may remove them when your doctor tells you to. When should you call for help? Call your doctor now or seek immediate medical care if:    · You have signs of infection, such as:  ? Increased pain, swelling, warmth, or redness. ? Red streaks leading from the site. ? Pus draining from the site. ? A fever.    Watch closely for changes in your health, and be sure to contact your doctor if you have any problems. Where can you learn more? Go to http://www.gray.com/  Enter N616 in the search box to learn more about \"Joint Injections: Care Instructions. \"  Current as of: March 2, 2020               Content Version: 12.6  © 7797-0906 [employer], [employer]. Care instructions adapted under license by [employer] (which disclaims liability or warranty for this information). If you have questions about a medical condition or this instruction, always ask your healthcare professional. [address] any warranty or liability for your use of this information.

## 2020-11-06 NOTE — PROGRESS NOTES
[address]  OFFICE PROCEDURE PROGRESS NOTE        Chart reviewed for the following:   Cyndie MEJIA, have reviewed the History, Physical and updated the Allergic reactions for [address] performed immediately prior to start of procedure:   Cyndie MEJIA, have performed the following reviews on [address] prior to the start of the procedure:            * Patient was identified by name and date of birth   * Agreement on procedure being performed was verified  * Risks and Benefits explained to the patient  * Procedure site verified and marked as necessary  * Patient was positioned for comfort  * Consent was signed and verified     Time: 1603pm      Date of procedure: 11/6/2020    Procedure performed by:   Dee Dee Lawson MD    Provider assisted by: N/A    Patient assisted by: self    How tolerated by patient: tolerated the procedure well with no complications    Post Procedural Pain Scale: 5    Comments: None          TIME ENDED:16:17pm    PROCEDURE: Right Knee Steroid Injection    PRE-PAIN: 8  POST-PAIN: 5    SPECIMEN SENT TO LAB: No

## 2020-11-06 NOTE — PROGRESS NOTES
Name and  Verified. Chief Complaint   Patient presents with    Knee Pain     Right x 2 weeks     Dr. Favio Kwok or [facility] would give patient a steroid inj. In his knee. Left knee has pain but doesn't bother him as much. Patient denies any injury. 1. Have you been to the ER, urgent care clinic since your last visit? Hospitalized since your last visit? Yes  10/18/2020  ER  [facility]  SOB/[facility]        2. Have you seen or consulted any other health care providers outside of the [address] [place] [address] [place] since your last visit? Include any pap smears or colon screening.    No

## 2020-11-11 RX ORDER — TADALAFIL 20 MG/1
TABLET ORAL
Qty: 6 TAB | Refills: 11 | Status: SHIPPED | OUTPATIENT
Start: 2020-11-11 | End: 2021-11-12

## 2020-11-30 ENCOUNTER — OFFICE VISIT (OUTPATIENT)
Dept: FAMILY MEDICINE CLINIC | Age: 55
End: 2020-11-30
Payer: COMMERCIAL

## 2020-11-30 VITALS
SYSTOLIC BLOOD PRESSURE: 144 MMHG | RESPIRATION RATE: 20 BRPM | HEART RATE: 90 BPM | BODY MASS INDEX: 52.24 KG/M2 | TEMPERATURE: 97.3 F | WEIGHT: 306 LBS | DIASTOLIC BLOOD PRESSURE: 86 MMHG | OXYGEN SATURATION: 98 % | HEIGHT: 64 IN

## 2020-11-30 DIAGNOSIS — C67.9 MALIGNANT NEOPLASM OF URINARY BLADDER, UNSPECIFIED SITE (HCC): ICD-10-CM

## 2020-11-30 DIAGNOSIS — R35.1 NOCTURIA: ICD-10-CM

## 2020-11-30 DIAGNOSIS — E66.01 OBESITY, MORBID (HCC): ICD-10-CM

## 2020-11-30 DIAGNOSIS — E11.9 TYPE 2 DIABETES MELLITUS WITHOUT COMPLICATION, WITHOUT LONG-TERM CURRENT USE OF INSULIN (HCC): ICD-10-CM

## 2020-11-30 DIAGNOSIS — G47.33 OSA (OBSTRUCTIVE SLEEP APNEA): ICD-10-CM

## 2020-11-30 DIAGNOSIS — Z00.00 ANNUAL PHYSICAL EXAM: Primary | ICD-10-CM

## 2020-11-30 DIAGNOSIS — I10 ESSENTIAL HYPERTENSION, BENIGN: ICD-10-CM

## 2020-11-30 DIAGNOSIS — Z23 NEEDS FLU SHOT: ICD-10-CM

## 2020-11-30 DIAGNOSIS — E78.2 MIXED HYPERLIPIDEMIA: ICD-10-CM

## 2020-11-30 PROCEDURE — 90686 IIV4 VACC NO PRSV 0.5 ML IM: CPT

## 2020-11-30 PROCEDURE — 99386 PREV VISIT NEW AGE 40-64: CPT | Performed by: FAMILY MEDICINE

## 2020-11-30 PROCEDURE — 90471 IMMUNIZATION ADMIN: CPT

## 2020-11-30 NOTE — PROGRESS NOTES
Subjective:     Vini Hogan is a 63 y.o. male presenting for annual exam and complete physical.    Patient Active Problem List   Diagnosis Code    Essential hypertension, benign I10    ED (erectile dysfunction) N52.9    GERD (gastroesophageal reflux disease) K21.9    Migraine syndrome G43.909    WESTLEY (obstructive sleep apnea) G47.33    Family history of prostate cancer Z80.42    Mixed hyperlipidemia E78.2    Encounter for long-term (current) use of other medications Z79.899    Family history of colon cancer Z80.0    Nocturia R35.1    IGT (impaired glucose tolerance) R73.02    Obesity, morbid (HCC) E66.01     Current Outpatient Medications   Medication Sig Dispense Refill    tadalafiL (CIALIS) 20 mg tablet TAKE 1 TABLET BY MOUTH EVERY DAY AS NEEDED 6 Tab 11    glimepiride (AMARYL) 4 mg tablet TAKE 1 TABLET BY MOUTH EVERY DAY IN THE MORNING 90 Tab 0    Accu-Chek Xochilt Plus test strp strip USE TO TEST BLOOD DAILY (BEFORE BREAKFAST). 100 Strip 1    losartan (COZAAR) 100 mg tablet TAKE 1 TABLET BY MOUTH EVERY DAY 90 Tab 3    dilTIAZem ER (CARDIZEM CD) 300 mg capsule TAKE 1 CAPSULE BY MOUTH EVERY DAY 90 Cap 2    Accu-Chek Xochilt Plus Meter misc Test blood sugar daily before breakfast. 1 Each 0    Blood-Glucose Meter monitoring kit As directed,daily,fasting 1 Kit 0    chlorthalidone (HYGROTEN) 25 mg tablet TAKE 1 TABLET BY MOUTH EVERY DAY 90 Tab 3    diclofenac EC (VOLTAREN) 75 mg EC tablet Take 1 Tab by mouth two (2) times a day. 30 Tab 0    HYDROcodone-acetaminophen (NORCO) 5-325 mg per tablet TAKE ONE TABLET BY MOUTH 3 TIMES PER DAY FOR 2 DAYS      glucose blood VI test strips (BLOOD GLUCOSE TEST) strip by Does Not Apply route Daily (before breakfast). 100 Strip 2    lancets (ONE TOUCH DELICA) 33 gauge misc by Does Not Apply route daily. Check blood sugars once daily 100 Lancet 3    glucose blood VI test strips (ONETOUCH VERIO) strip by Does Not Apply route daily.  Check blood sugars once daily 50 Strip 11     Allergies   Allergen Reactions    Ramipril Cough     Past Medical History:   Diagnosis Date    [facility]     Diabetes ([facility]     ED (erectile dysfunction) 6/15/2010    Encounter for long-term (current) use of other medications 4/10/2011    Essential hypertension, benign 6/15/2010    Family history of colon cancer 2/17/2012    GERD (gastroesophageal reflux disease) 6/15/2010    Headache(784.0)     Hypertension     Migraine syndrome 6/15/2010    Mixed hyperlipidemia 4/10/2011    WESTLEY (obstructive sleep apnea) 6/15/2010     Past Surgical History:   Procedure Laterality Date    COLONOSCOPY N/A 6/7/2016    COLONOSCOPY performed by Chanelle Sales MD at . [facility]  6/7/2016         HX COLONOSCOPY      HX ORTHOPAEDIC      HX UROLOGICAL       Family History   Problem Relation Age of Onset    Diabetes Mother     Stroke Mother     Hypertension Mother     Cancer Father     Diabetes Father      Social History     Tobacco Use    Smoking status: Never Smoker    Smokeless tobacco: Never Used   Substance Use Topics    Alcohol use:  Yes     Alcohol/week: 1.7 standard drinks     Types: 2 Cans of beer per week             Review of Systems  Constitutional: positive for fatigue  Eyes: negative  Ears, nose, mouth, throat, and face: negative  Respiratory: negative  Cardiovascular: negative  Gastrointestinal: negative  Genitourinary:negative  Integument/breast: negative  Hematologic/lymphatic: negative  Musculoskeletal:positive for knee arthritis  Neurological: negative  Behavioral/Psych: negative    Objective:       Physical exam:   General appearance - alert, well appearing, and in no distress  Mental status - alert, oriented to person, place, and time  Eyes - pupils equal and reactive, extraocular eye movements intact  Ears - bilateral TM's and external ear canals normal  Nose - normal and patent, no erythema, discharge or polyps  Mouth - mucous membranes moist, pharynx normal without lesions  Neck - supple, no significant adenopathy, carotids upstroke normal bilaterally, no bruits, thyroid exam: thyroid is normal in size without nodules or tenderness  Chest - clear to auscultation, no wheezes, rales or rhonchi, symmetric air entry  Heart - normal rate, regular rhythm, normal S1, S2, no murmurs, rubs, clicks or gallops  Abdomen - soft, nontender, nondistended, no masses or organomegaly  Rectal - negative without mass, lesions or tenderness, stool guaiac negative, PROSTATE EXAM: smooth and symmetric without nodules or tenderness  Musculoskeletal - no joint tenderness, deformity or swelling  Extremities - peripheral pulses normal, no pedal edema, no clubbing or cyanosis  Skin - normal coloration and turgor, no rashes, no suspicious skin lesions noted     Assessment/Plan:       lose weight, bring BP log to office visit. Diagnoses and all orders for this visit:    1. Annual physical exam  -     METABOLIC PANEL, COMPREHENSIVE  -     HEMOGLOBIN A1C WITH EAG    2. Type 2 diabetes mellitus without complication, without long-term current use of insulin ([address]    3. Essential hypertension, benign,inadequate control. Pt says its white coat syndrome,will check BP at home daily    4. Mixed hyperlipidemia  -     LIPID PANEL    5. Obesity, morbid ([address]    6. WESTLEY (obstructive sleep apnea)    7. Nocturia  -     PSA, DIAGNOSTIC (PROSTATE SPECIFIC AG)    8. Malignant neoplasm of urinary bladder, unspecified site ([address]    9. Needs flu shot  -     INFLUENZA VIRUS VACCINE, QUADRIVALENT (RIV4), DERIVED FROM RECOMBINANT DNA,HEMAGGLUTININ(HA) PROTEIN ONLY, PF ABX FREE      Follow-up and Dispositions    · Return in about 2 weeks (around 12/14/2020).      Macy Fulton

## 2020-11-30 NOTE — PROGRESS NOTES
Chief Complaint   Patient presents with    Well Male     physical       1. Have you been to the ER, urgent care clinic since your last visit? Hospitalized since your last visit?no    2. Have you seen or consulted any other health care providers outside of the [address] [place] [address] since your last visit? Include any pap smears or colon screening.  no

## 2020-12-01 LAB
ALBUMIN SERPL-MCNC: 4.5 G/DL (ref 3.8–4.9)
ALBUMIN/GLOB SERPL: 1.7 {RATIO} (ref 1.2–2.2)
ALP SERPL-CCNC: 104 IU/L (ref 39–117)
ALT SERPL-CCNC: 17 IU/L (ref 0–44)
AST SERPL-CCNC: 26 IU/L (ref 0–40)
BILIRUB SERPL-MCNC: 0.3 MG/DL (ref 0–1.2)
BUN SERPL-MCNC: 14 MG/DL (ref 6–24)
BUN/CREAT SERPL: 11 (ref 9–20)
CALCIUM SERPL-MCNC: 10 MG/DL (ref 8.7–10.2)
CHLORIDE SERPL-SCNC: 98 MMOL/L (ref 96–106)
CHOLEST SERPL-MCNC: 164 MG/DL (ref 100–199)
CO2 SERPL-SCNC: 30 MMOL/L (ref 20–29)
CREAT SERPL-MCNC: 1.32 MG/DL (ref 0.76–1.27)
EST. AVERAGE GLUCOSE BLD GHB EST-MCNC: 151 MG/DL
GLOBULIN SER CALC-MCNC: 2.7 G/DL (ref 1.5–4.5)
GLUCOSE SERPL-MCNC: 116 MG/DL (ref 65–99)
HBA1C MFR BLD: 6.9 % (ref 4.8–5.6)
HDLC SERPL-MCNC: 62 MG/DL
INTERPRETATION, 910389: NORMAL
LDLC SERPL CALC-MCNC: 79 MG/DL (ref 0–99)
Lab: NORMAL
POTASSIUM SERPL-SCNC: 3.9 MMOL/L (ref 3.5–5.2)
PROT SERPL-MCNC: 7.2 G/DL (ref 6–8.5)
PSA SERPL-MCNC: 1.3 NG/ML (ref 0–4)
SODIUM SERPL-SCNC: 140 MMOL/L (ref 134–144)
TRIGL SERPL-MCNC: 134 MG/DL (ref 0–149)
VLDLC SERPL CALC-MCNC: 23 MG/DL (ref 5–40)

## 2020-12-09 ENCOUNTER — TELEPHONE (OUTPATIENT)
Dept: FAMILY MEDICINE CLINIC | Age: 55
End: 2020-12-09

## 2020-12-09 ENCOUNTER — PATIENT OUTREACH (OUTPATIENT)
Dept: CASE MANAGEMENT | Age: 55
End: 2020-12-09

## 2020-12-09 NOTE — TELEPHONE ENCOUNTER
----- Message from Yovani Madera sent at 12/9/2020  1:41 PM EST -----  Regarding: Dr. Bryce Thompson  General Message/Vendor Calls    Caller's first and last name:      Reason for call: Nurse Call       Callback required yes/no and why: Yes       Best contact number(s): [phone]      Details to clarify the request: Patient is having foot pain, hard to walk, please call       Yovani Madera

## 2020-12-09 NOTE — TELEPHONE ENCOUNTER
Pt requesting a call back for foot pain possible gout. Pt state he has been out of work for 3 days and cannot put pressure on the foot. He can be reached at [phone].

## 2020-12-10 DIAGNOSIS — M10.9 ACUTE GOUT OF RIGHT FOOT, UNSPECIFIED CAUSE: Primary | ICD-10-CM

## 2020-12-10 RX ORDER — PREDNISONE 10 MG/1
10 TABLET ORAL 2 TIMES DAILY
Qty: 10 TAB | Refills: 0 | Status: SHIPPED | OUTPATIENT
Start: 2020-12-10 | End: 2020-12-21

## 2020-12-10 NOTE — PROGRESS NOTES
Ambulatory Care Management Note      Date/Time:  12/10/2020 3:03 PM    This patient was received as a referral from daily discharge assignment   Top Challenges reviewed with the provider   No challenges at this time             Ambulatory  contacted patient for discussion and case management of diabetes   Summary of patients top problems:   1. Diabetes - A1C 6.9. Taking glimepiride daily. 2. Gout - Patient reports he has been out of work this week due to gout flare. Has contacted PCP who called in prescription. Patient's challenges to self management identified:   lack of knowledge about disease and level of motivation      Medication Management:  good adherence and poor understanding    Advance Care Planning:   Does patient have an Advance Directive:  currently not on file, will address during future call    [employer], Referrals, and Durable Medical Equipment: none    PCP/Specialist follow up:   Future Appointments   Date Time Provider Ly Mac   12/15/2020 10:00 AM Mariola Garay MD [facility] BS AMB   1/13/2021  9:00 AM Alex Monteiro MD [facility] BS AMB   1/19/2021  8:00 AM Guillaume De Los Santos, DO [facility] BS AMB          Goals      Skills and education necessary to properly manage diabetes. 12/10/20 - A1C 6.9. Patient reports taking medications as prescribed. Discussed diabetic diet. Patient interested in improving diet and also achieving weight loss. Contact information for [facility] \"Dial and Dietician\" service given. Patient will call and discuss strategies for weight loss and blood sugar management. Patient verbalized understanding of all information discussed. Patient has this Ambulatory Care Manager's contact information for any further questions, concerns, or needs.

## 2020-12-15 ENCOUNTER — OFFICE VISIT (OUTPATIENT)
Dept: FAMILY MEDICINE CLINIC | Age: 55
End: 2020-12-15
Payer: COMMERCIAL

## 2020-12-15 VITALS
SYSTOLIC BLOOD PRESSURE: 136 MMHG | DIASTOLIC BLOOD PRESSURE: 90 MMHG | RESPIRATION RATE: 18 BRPM | HEIGHT: 64 IN | HEART RATE: 76 BPM | TEMPERATURE: 97.1 F | OXYGEN SATURATION: 97 % | BODY MASS INDEX: 52 KG/M2 | WEIGHT: 304.6 LBS

## 2020-12-15 DIAGNOSIS — E11.9 TYPE 2 DIABETES MELLITUS WITHOUT COMPLICATION, WITHOUT LONG-TERM CURRENT USE OF INSULIN (HCC): ICD-10-CM

## 2020-12-15 DIAGNOSIS — I10 ESSENTIAL HYPERTENSION, BENIGN: Primary | ICD-10-CM

## 2020-12-15 DIAGNOSIS — M25.571 ACUTE RIGHT ANKLE PAIN: ICD-10-CM

## 2020-12-15 PROCEDURE — 99213 OFFICE O/P EST LOW 20 MIN: CPT | Performed by: FAMILY MEDICINE

## 2020-12-15 RX ORDER — INDOMETHACIN 50 MG/1
50 CAPSULE ORAL
Qty: 50 CAP | Refills: 1 | Status: SHIPPED | OUTPATIENT
Start: 2020-12-15 | End: 2021-03-15

## 2020-12-15 NOTE — PROGRESS NOTES
Chief Complaint   Patient presents with    Hypertension     F/U on elevated BP.  Foot Pain     Pt having R foot pain. 1. Have you been to the ER, urgent care clinic since your last visit? Hospitalized since your last visit? No    2. Have you seen or consulted any other health care providers outside of the [address] [place] [address] since your last visit? Include any pap smears or colon screening.  No

## 2020-12-15 NOTE — PROGRESS NOTES
HISTORY OF PRESENT ILLNESS  Ivette Gifford III is a 63 y.o. male. f/u rt ankle pain ,improving rapidly. No apparent injury,suspicious for gout,improving rapidly on prednisone  Foot Pain   The history is provided by the patient. This is a new problem. The current episode started more than 1 week ago. The problem occurs daily. The problem has been rapidly improving. Review of Systems   Gastrointestinal: Negative for heartburn. Musculoskeletal: Positive for joint pain. Physical Exam  Constitutional:       Appearance: Normal appearance. He is obese. Cardiovascular:      Pulses: Normal pulses. Heart sounds: Normal heart sounds. Pulmonary:      Effort: Pulmonary effort is normal.      Breath sounds: Normal breath sounds. Musculoskeletal:      Right ankle: He exhibits decreased range of motion and swelling. He exhibits no deformity. Neurological:      Mental Status: He is alert. ASSESSMENT and PLAN  Diagnoses and all orders for this visit:    1. Essential hypertension, benign  -     METABOLIC PANEL, BASIC    2. Type 2 diabetes mellitus without complication, without long-term current use of insulin ([address]    3. Acute right ankle pain,likely gout  -     XR ANKLE RT MIN 3 V; Future  -     XR FOOT RT MIN 3 V; Future  -     URIC ACID  -     SED RATE (ESR)  -     C REACTIVE PROTEIN, QT  -     indomethacin (INDOCIN) 50 mg capsule; Take 1 Cap by mouth three (3) times daily as needed for Gout or Pain for up to 90 days. Follow-up and Dispositions    · Return in about 3 months (around 3/15/2021).

## 2020-12-17 DIAGNOSIS — E79.0 HYPERURICEMIA: Primary | ICD-10-CM

## 2020-12-17 LAB
BUN SERPL-MCNC: 12 MG/DL (ref 6–24)
BUN/CREAT SERPL: 10 (ref 9–20)
CALCIUM SERPL-MCNC: 9.6 MG/DL (ref 8.7–10.2)
CHLORIDE SERPL-SCNC: 101 MMOL/L (ref 96–106)
CO2 SERPL-SCNC: 30 MMOL/L (ref 20–29)
CREAT SERPL-MCNC: 1.15 MG/DL (ref 0.76–1.27)
CRP SERPL-MCNC: 13 MG/L (ref 0–10)
ERYTHROCYTE [SEDIMENTATION RATE] IN BLOOD BY WESTERGREN METHOD: 13 MM/HR (ref 0–30)
GLUCOSE SERPL-MCNC: 98 MG/DL (ref 65–99)
POTASSIUM SERPL-SCNC: 4.1 MMOL/L (ref 3.5–5.2)
SODIUM SERPL-SCNC: 143 MMOL/L (ref 134–144)
URATE SERPL-MCNC: 8.9 MG/DL (ref 3.8–8.4)

## 2020-12-17 RX ORDER — ALLOPURINOL 100 MG/1
100 TABLET ORAL DAILY
Qty: 30 TAB | Refills: 11 | Status: SHIPPED | OUTPATIENT
Start: 2020-12-17 | End: 2022-01-06

## 2020-12-21 DIAGNOSIS — M10.9 ACUTE GOUT OF RIGHT FOOT, UNSPECIFIED CAUSE: ICD-10-CM

## 2020-12-21 RX ORDER — PREDNISONE 10 MG/1
TABLET ORAL
Qty: 10 TAB | Refills: 0 | Status: SHIPPED | OUTPATIENT
Start: 2020-12-21 | End: 2020-12-22 | Stop reason: SDUPTHER

## 2020-12-21 NOTE — TELEPHONE ENCOUNTER
----- Message from Amberly Devine sent at 12/21/2020  4:04 PM EST -----  Regarding: Dr. Nori Leal        Reason for call: callback       Callback required yes/no and why: yes       Best contact number(s): [phone]      Details to clarify the request: Patient would like a callback regarding gout      Amberly Devine

## 2020-12-22 RX ORDER — PREDNISONE 10 MG/1
TABLET ORAL
Qty: 10 TAB | Refills: 0 | Status: SHIPPED | OUTPATIENT
Start: 2020-12-22 | End: 2020-12-29 | Stop reason: ALTCHOICE

## 2020-12-22 NOTE — TELEPHONE ENCOUNTER
Patient c/o gout in his right knee. Patient is requesting prednisone.  Telephone number [phone]

## 2020-12-28 ENCOUNTER — TELEPHONE (OUTPATIENT)
Dept: FAMILY MEDICINE CLINIC | Age: 55
End: 2020-12-28

## 2020-12-28 NOTE — TELEPHONE ENCOUNTER
----- Message from Madelin Celis sent at 12/28/2020  8:07 AM EST -----  Regarding: Dr. Linda Levine  Appointment not available    Caller's first and last name and relationship to patient (if not the patient): pt      Best contact number: [phone]      Preferred date and time: today      Scheduled appointment date and time: 12/30/20 3:00      Reason for appointment: Bilateral knee pain, rated at an 8      Details to clarify the request: n/a      Madelin Celis

## 2020-12-29 ENCOUNTER — TELEPHONE (OUTPATIENT)
Dept: FAMILY MEDICINE CLINIC | Age: 55
End: 2020-12-29

## 2020-12-29 ENCOUNTER — OFFICE VISIT (OUTPATIENT)
Dept: FAMILY MEDICINE CLINIC | Age: 55
End: 2020-12-29
Payer: COMMERCIAL

## 2020-12-29 VITALS
RESPIRATION RATE: 20 BRPM | BODY MASS INDEX: 50.5 KG/M2 | HEIGHT: 64 IN | TEMPERATURE: 97.3 F | HEART RATE: 84 BPM | WEIGHT: 295.8 LBS | SYSTOLIC BLOOD PRESSURE: 142 MMHG | DIASTOLIC BLOOD PRESSURE: 84 MMHG | OXYGEN SATURATION: 97 %

## 2020-12-29 DIAGNOSIS — M10.9 ACUTE GOUT OF RIGHT FOOT, UNSPECIFIED CAUSE: ICD-10-CM

## 2020-12-29 DIAGNOSIS — M17.12 PRIMARY OSTEOARTHRITIS OF LEFT KNEE: ICD-10-CM

## 2020-12-29 DIAGNOSIS — M17.11 PRIMARY OSTEOARTHRITIS OF RIGHT KNEE: Primary | ICD-10-CM

## 2020-12-29 DIAGNOSIS — I10 ESSENTIAL HYPERTENSION, BENIGN: ICD-10-CM

## 2020-12-29 PROCEDURE — 99212 OFFICE O/P EST SF 10 MIN: CPT | Performed by: FAMILY MEDICINE

## 2020-12-29 PROCEDURE — 20610 DRAIN/INJ JOINT/BURSA W/O US: CPT | Performed by: FAMILY MEDICINE

## 2020-12-29 RX ORDER — METHYLPREDNISOLONE ACETATE 40 MG/ML
40 INJECTION, SUSPENSION INTRA-ARTICULAR; INTRALESIONAL; INTRAMUSCULAR; SOFT TISSUE ONCE
Qty: 1 ML | Refills: 0
Start: 2020-12-29 | End: 2020-12-29

## 2020-12-29 NOTE — TELEPHONE ENCOUNTER
FMLA form from [facility] faxed to [phone] and copy left at  for patient to

## 2020-12-29 NOTE — PROGRESS NOTES
[address]  OFFICE PROCEDURE PROGRESS NOTE        Chart reviewed for the following:   Mitchel Tee LPN, have reviewed the History, Physical and updated the Allergic reactions for [address] performed immediately prior to start of procedure:   Mitchel Tee LPN, have performed the following reviews on [facility] prior to the start of the procedure:            * Patient was identified by name and date of birth   * Agreement on procedure being performed was verified  * Risks and Benefits explained to the patient  * Procedure site verified and marked as necessary  * Patient was positioned for comfort  * Consent was signed and verified     Time:       Date of procedure: 12/29/2020    Procedure performed by: Alireza Franco MD    Provider assisted by: none    Patient assisted by: none    How tolerated by patient: well    Post Procedural Pain Scale: 5/10    Comments: Patient is here for bilateral knee joint injections pain was rated 8/10 prior to the procedure.

## 2020-12-29 NOTE — PROGRESS NOTES
HISTORY OF PRESENT ILLNESS  Tammy Ansari III is a 63 y.o. male. C/O worsening bilateralm knee pain,giving way. Last shot in rt knee did not help much. Having difficulty with stairs  Knee Pain   The history is provided by the patient. This is a chronic problem. The problem occurs daily. The problem has been gradually worsening. The pain is present in the left knee and right knee. The pain is at a severity of 5/10. The pain is moderate. Associated symptoms include limited range of motion and stiffness. He has tried OTC pain medications for the symptoms. The treatment provided mild relief. There has been no history of extremity trauma. Review of Systems   Constitutional: Negative for fever and malaise/fatigue. Cardiovascular: Negative for chest pain. Genitourinary: Negative for frequency. Musculoskeletal: Positive for stiffness. Physical Exam  Constitutional:       Appearance: Normal appearance. He is obese. HENT:      Head: Normocephalic and atraumatic. Cardiovascular:      Rate and Rhythm: Normal rate and regular rhythm. Pulses: Normal pulses. Heart sounds: Normal heart sounds. Pulmonary:      Effort: Pulmonary effort is normal.      Breath sounds: Normal breath sounds. Abdominal:      General: Abdomen is flat. Palpations: Abdomen is soft. Musculoskeletal:      Right knee: He exhibits decreased range of motion, swelling and effusion. Tenderness found. Medial joint line tenderness noted. Left knee: He exhibits decreased range of motion, swelling and effusion. Tenderness found. Lateral joint line tenderness noted. Skin:     General: Skin is warm and dry. Neurological:      Mental Status: He is alert. ASSESSMENT and PLAN  Diagnoses and all orders for this visit:    1. Primary osteoarthritis of right knee  -     methylPREDNISolone acetate (DEPO-MedroL) 40 mg/mL injection; 1 mL by IntraMUSCular route once for 1 dose.   -     METHYLPREDNISOLONE ACETATE INJECTION 40 MG  -     NC DRAIN/INJECT LARGE JOINT/BURSA    2. Primary osteoarthritis of left knee  -     methylPREDNISolone acetate (DEPO-MedroL) 40 mg/mL injection; 1 mL by IntraMUSCular route once for 1 dose. -     METHYLPREDNISOLONE ACETATE INJECTION 40 MG  -     NC DRAIN/INJECT LARGE JOINT/BURSA    3. Essential hypertension, benign    4. Acute gout of right foot, unspecified cause      Follow-up and Dispositions    · Return in about 3 months (around 3/29/2021). Indications:   Symptom relief from bilateral knee osteoarthritis    Procedure:  After consent was obtained,and procedure risks and benefits reviewed,using sterile technique the Rt and Lt Knee joints were prepped using Betadine. The joints were entered usinga 23 ga needles and 40 mg of Depo-Medrol and 1 ml Marcaine were injected without difficulty ,and the needles were  withdrawn. The procedure was well tolerated,with negligile discomfort postprocedure. The patient was asked to continue to rest the joint fora few days before resuming normal activities. They were advised that there may be increased pain for the next 1-2 days,and to watch for fever,redness,or increased swelling or pain. They were advised to call if such symptoms develop. Joint Injection instruction sheet was given to the patient and reviewed. The patient departed in good condition

## 2020-12-29 NOTE — PROGRESS NOTES
Chief Complaint   Patient presents with    Knee Pain     Bilateral knee pain     1. Have you been to the ER, urgent care clinic since your last visit? Hospitalized since your last visit?no    2. Have you seen or consulted any other health care providers outside of the [address] [place] [address] since your last visit? Include any pap smears or colon screening.  no

## 2021-01-14 ENCOUNTER — PATIENT OUTREACH (OUTPATIENT)
Dept: CASE MANAGEMENT | Age: 56
End: 2021-01-14

## 2021-01-14 DIAGNOSIS — I10 ESSENTIAL HYPERTENSION, BENIGN: ICD-10-CM

## 2021-01-15 RX ORDER — CHLORTHALIDONE 25 MG/1
TABLET ORAL
Qty: 90 TAB | Refills: 3 | Status: SHIPPED | OUTPATIENT
Start: 2021-01-15 | End: 2021-07-27 | Stop reason: ALTCHOICE

## 2021-01-15 NOTE — PROGRESS NOTES
Goals      Skills and education necessary to properly manage diabetes. 12/10/20 - A1C 6.9. Patient reports taking medications as prescribed. Discussed diabetic diet. Patient interested in improving diet and also achieving weight loss. [facility] \"Dial and Dietician\" service given. Patient will call and discuss strategies for weight loss and blood sugar management. 1/15/20 - Patient reports doing well with diabetes management and has no concerns at this time. Reports he is working on improving diet and increasing exercise. Has not utilized dietician service yet, but confirms he has contact information saved if he decided to call in the future. Patient will continue plan of care.

## 2021-02-22 ENCOUNTER — PATIENT OUTREACH (OUTPATIENT)
Dept: CASE MANAGEMENT | Age: 56
End: 2021-02-22

## 2021-02-23 NOTE — PROGRESS NOTES
Patient has graduated from the Complex Case Management  program on 2/23/21.  Patient/family has the ability to self-manage at this time Care management goals have been completed. No further Ambulatory Care Manager follow up scheduled.    Goals Addressed                 This Visit's Progress    • COMPLETED: Completes ACP        2/23/21 - Discussed advanced care planning. Patient declines at this time.       • COMPLETED: Skills and education necessary to properly manage diabetes.        12/10/20 - A1C 6.9. Patient reports taking medications as prescribed. Discussed diabetic diet. Patient interested in improving diet and also achieving weight loss. Contact information for [facility] \"Dial and Dietician\" service given. Patient will call and discuss strategies for weight loss and blood sugar management.     1/15/20 - Patient reports doing well with diabetes management and has no concerns at this time. Reports he is working on improving diet and increasing exercise. Has not utilized dietician service yet, but confirms he has contact information saved if he decided to call in the future. Patient will continue plan of care.     2/23/21 - Patient has no questions or concerns. Feels that he is self managing well. Resolving goal.             Patient has Ambulatory Care Manager's contact information for any further questions, concerns, or needs.  Patients upcoming visits:    Future Appointments   Date Time Provider Department Center   3/16/2021  8:20 AM Pj Connelly MD [facility] BS AMB

## 2021-02-25 DIAGNOSIS — E11.9 TYPE 2 DIABETES MELLITUS WITHOUT COMPLICATION, WITHOUT LONG-TERM CURRENT USE OF INSULIN (HCC): ICD-10-CM

## 2021-02-25 RX ORDER — GLIMEPIRIDE 4 MG/1
TABLET ORAL
Qty: 90 TAB | Refills: 0 | Status: SHIPPED | OUTPATIENT
Start: 2021-02-25 | End: 2021-06-18

## 2021-04-14 ENCOUNTER — OFFICE VISIT (OUTPATIENT)
Dept: FAMILY MEDICINE CLINIC | Age: 56
End: 2021-04-14
Payer: COMMERCIAL

## 2021-04-14 VITALS
HEIGHT: 64 IN | RESPIRATION RATE: 18 BRPM | HEART RATE: 84 BPM | DIASTOLIC BLOOD PRESSURE: 96 MMHG | BODY MASS INDEX: 51.49 KG/M2 | OXYGEN SATURATION: 97 % | WEIGHT: 301.6 LBS | SYSTOLIC BLOOD PRESSURE: 144 MMHG | TEMPERATURE: 98 F

## 2021-04-14 DIAGNOSIS — M17.11 PRIMARY OSTEOARTHRITIS OF RIGHT KNEE: Primary | ICD-10-CM

## 2021-04-14 DIAGNOSIS — I10 ESSENTIAL HYPERTENSION, BENIGN: ICD-10-CM

## 2021-04-14 DIAGNOSIS — M25.571 ACUTE RIGHT ANKLE PAIN: ICD-10-CM

## 2021-04-14 PROCEDURE — 99212 OFFICE O/P EST SF 10 MIN: CPT | Performed by: FAMILY MEDICINE

## 2021-04-14 RX ORDER — PREDNISONE 10 MG/1
10 TABLET ORAL 2 TIMES DAILY
Qty: 14 TAB | Refills: 0 | Status: SHIPPED | OUTPATIENT
Start: 2021-04-14 | End: 2021-08-06 | Stop reason: ALTCHOICE

## 2021-04-14 NOTE — LETTER
NOTIFICATION OF RETURN TO WORK / SCHOOL 
 
4/14/2021 3:11 PM 
 
[phone] [id number] Yovani Recio To Whom It May Concern: 
 
Dirk Gifford III was under the care of [facility] from 4/9/21. He will be able to return to work/school on 4/19/21 with no restrictions. If there are questions or concerns please have the patient contact our office. Sincerely, Abigail Powell MD

## 2021-04-14 NOTE — PROGRESS NOTES
HISTORY OF PRESENT ILLNESS  Americo Gonzalez III is a 63 y.o. male. worsening rt knee pain and swelling. No apparent injury,aggravated by walking at work. Having achilles tendonitis managed by Podiatry. F/U HBP,did not take BP meds today  Knee Pain   The history is provided by the patient. This is a chronic problem. The problem occurs daily. The problem has been gradually worsening. The pain is present in the right knee. The pain is at a severity of 5/10. The pain is moderate. Associated symptoms include limited range of motion and stiffness. The symptoms are aggravated by activity and standing. He has tried OTC pain medications for the symptoms. Hypertension   The history is provided by the patient. This is a chronic problem. The problem has been gradually worsening. Pertinent negatives include no chest pain, no orthopnea, no palpitations, no malaise/fatigue, no peripheral edema, no dizziness and no shortness of breath. Review of Systems   Constitutional: Negative. Negative for chills, fever and malaise/fatigue. Respiratory: Negative for shortness of breath. Cardiovascular: Negative for chest pain, palpitations and orthopnea. Musculoskeletal: Positive for joint pain and stiffness. Neurological: Negative for dizziness. Physical Exam  Constitutional:       Appearance: Normal appearance. He is obese. HENT:      Head: Atraumatic. Cardiovascular:      Rate and Rhythm: Normal rate and regular rhythm. Pulses: Normal pulses. Heart sounds: Normal heart sounds. Pulmonary:      Effort: Pulmonary effort is normal.      Breath sounds: Normal breath sounds. Musculoskeletal:      Right knee: He exhibits decreased range of motion, swelling and effusion. Tenderness found. Lateral joint line tenderness noted. Neurological:      Mental Status: He is alert. Diagnoses and all orders for this visit:    1. Primary osteoarthritis of right knee,recommend icing ,rest,prednisone,rtw 4/19/21    2. Acute right ankle pain    3. Essential hypertension, benign    Other orders  -     predniSONE (DELTASONE) 10 mg tablet; Take 10 mg by mouth two (2) times a day. Follow-up and Dispositions    · Return in about 4 weeks (around 5/12/2021). Follow-up and Dispositions    · Return in about 4 weeks (around 5/12/2021).

## 2021-05-25 RX ORDER — DILTIAZEM HYDROCHLORIDE 300 MG/1
CAPSULE, COATED, EXTENDED RELEASE ORAL
Qty: 90 CAPSULE | Refills: 2 | Status: SHIPPED | OUTPATIENT
Start: 2021-05-25

## 2021-06-18 DIAGNOSIS — E11.9 TYPE 2 DIABETES MELLITUS WITHOUT COMPLICATION, WITHOUT LONG-TERM CURRENT USE OF INSULIN (HCC): ICD-10-CM

## 2021-06-18 RX ORDER — GLIMEPIRIDE 4 MG/1
TABLET ORAL
Qty: 90 TABLET | Refills: 0 | Status: SHIPPED | OUTPATIENT
Start: 2021-06-18 | End: 2021-07-27

## 2021-06-20 RX ORDER — BLOOD SUGAR DIAGNOSTIC
STRIP MISCELLANEOUS
Qty: 100 STRIP | Refills: 1 | Status: SHIPPED | OUTPATIENT
Start: 2021-06-20

## 2021-07-06 ENCOUNTER — TRANSCRIBE ORDER (OUTPATIENT)
Dept: SCHEDULING | Age: 56
End: 2021-07-06

## 2021-07-06 DIAGNOSIS — Z86.010 HISTORY OF COLON POLYPS: ICD-10-CM

## 2021-07-06 DIAGNOSIS — K62.5 PAINLESS RECTAL BLEEDING: ICD-10-CM

## 2021-07-06 DIAGNOSIS — R14.0 BLOATING: Primary | ICD-10-CM

## 2021-07-08 ENCOUNTER — OFFICE VISIT (OUTPATIENT)
Dept: FAMILY MEDICINE CLINIC | Age: 56
End: 2021-07-08
Payer: COMMERCIAL

## 2021-07-08 VITALS
TEMPERATURE: 98.1 F | OXYGEN SATURATION: 97 % | DIASTOLIC BLOOD PRESSURE: 84 MMHG | HEART RATE: 100 BPM | SYSTOLIC BLOOD PRESSURE: 138 MMHG | WEIGHT: 285.4 LBS | RESPIRATION RATE: 18 BRPM | HEIGHT: 64 IN | BODY MASS INDEX: 48.73 KG/M2

## 2021-07-08 DIAGNOSIS — I10 ESSENTIAL HYPERTENSION, BENIGN: ICD-10-CM

## 2021-07-08 DIAGNOSIS — E66.01 OBESITY, MORBID (HCC): ICD-10-CM

## 2021-07-08 DIAGNOSIS — E66.01 MORBID OBESITY WITH BMI OF 45.0-49.9, ADULT (HCC): ICD-10-CM

## 2021-07-08 DIAGNOSIS — R60.0 PEDAL EDEMA: Primary | ICD-10-CM

## 2021-07-08 DIAGNOSIS — E11.9 TYPE 2 DIABETES MELLITUS WITHOUT COMPLICATION, WITHOUT LONG-TERM CURRENT USE OF INSULIN (HCC): ICD-10-CM

## 2021-07-08 DIAGNOSIS — E78.2 MIXED HYPERLIPIDEMIA: ICD-10-CM

## 2021-07-08 LAB
COMMENT, HOLDF: NORMAL
HBA1C MFR BLD HPLC: 7 %
SAMPLES BEING HELD,HOLD: NORMAL

## 2021-07-08 PROCEDURE — 99213 OFFICE O/P EST LOW 20 MIN: CPT | Performed by: FAMILY MEDICINE

## 2021-07-08 PROCEDURE — 3051F HG A1C>EQUAL 7.0%<8.0%: CPT | Performed by: FAMILY MEDICINE

## 2021-07-08 PROCEDURE — 83036 HEMOGLOBIN GLYCOSYLATED A1C: CPT | Performed by: FAMILY MEDICINE

## 2021-07-08 RX ORDER — FUROSEMIDE 20 MG/1
20 TABLET ORAL
Qty: 30 TABLET | Refills: 1 | OUTPATIENT
Start: 2021-07-08 | End: 2021-07-20

## 2021-07-08 NOTE — LETTER
NOTIFICATION OF RETURN TO WORK / SCHOOL    7/8/2021 10:22 AM    Mr. Tete Trivedi  [address] [facility] [id number]  . To Whom It May Concern:    Shyanne Carpio III was under the care of [facility] from 7/6/21. He will be able to return to work/school on 7/12/21   with no restrictions. If there are questions or concerns please have the patient contact our office.     Sincerely,      Moraima Guidry MD

## 2021-07-08 NOTE — PROGRESS NOTES
HISTORY OF PRESENT ILLNESS  Esme Camacho III is a 63 y.o. male. c/o 1 week bilateral foot and ankle swelling. No pnd orthopnea,sob,escalante,chest pain. No change in meds or diet. F/U HBP,Hyperuricemia and gout,chol  Swelling  The history is provided by the patient. This is a new problem. The problem occurs daily. The problem has not changed since onset. Pertinent negatives include no chest pain. Diabetes  The history is provided by the patient. This is a chronic problem. The problem occurs daily. Pertinent negatives include no chest pain. Review of Systems   Constitutional: Negative for fever and malaise/fatigue. Cardiovascular: Negative for chest pain and claudication. Skin: Negative for rash. Physical Exam  Constitutional:       Appearance: He is obese. HENT:      Head: Normocephalic. Right Ear: Tympanic membrane normal.      Left Ear: Tympanic membrane normal.      Nose: Nose normal.      Mouth/Throat:      Mouth: Mucous membranes are moist.   Cardiovascular:      Rate and Rhythm: Normal rate and regular rhythm. Pulses: Normal pulses. Heart sounds: Normal heart sounds. Pulmonary:      Effort: Pulmonary effort is normal.      Breath sounds: Normal breath sounds. Abdominal:      Palpations: Abdomen is soft. Musculoskeletal:         General: No swelling. Cervical back: Normal range of motion and neck supple. Right lower leg: Edema present. Left lower leg: Edema present. Neurological:      General: No focal deficit present. Mental Status: He is alert and oriented to person, place, and time. ASSESSMENT and PLAN  Diagnoses and all orders for this visit:    1. Pedal edema,multifactorial  -     furosemide (LASIX) 20 mg tablet; Take 1 Tablet by mouth daily as needed (edema).     2. Type 2 diabetes mellitus without complication, without long-term current use of insulin (HCC)  -     AMB POC HEMOGLOBIN A1C  -     MICROALBUMIN, UR, RAND W/ MICROALB/CREAT RATIO; Future    3. Essential hypertension, benign,controlled  -     METABOLIC PANEL, COMPREHENSIVE; Future  -     CBC WITH AUTOMATED DIFF; Future  -     AMB POC URINALYSIS DIP STICK AUTO W/O MICRO    4. Mixed hyperlipidemia  -     LIPID PANEL; Future    5. Obesity, morbid ([facility]    6. Morbid obesity with BMI of 45.0-49.9, adult ([facility]      Follow-up and Dispositions    · Return in about 3 months (around 10/8/2021).

## 2021-07-08 NOTE — PROGRESS NOTES
Chief Complaint   Patient presents with    Swelling     Pt has swelling in feet and ankles w/ pain. 1. Have you been to the ER, urgent care clinic since your last visit? Hospitalized since your last visit? No    2. Have you seen or consulted any other health care providers outside of the [address] [place] [address] since your last visit? Include any pap smears or colon screening.  No

## 2021-07-11 LAB
ALBUMIN SERPL-MCNC: 4.5 G/DL (ref 3.5–5)
ALBUMIN/GLOB SERPL: 1.4 {RATIO} (ref 1.1–2.2)
ALP SERPL-CCNC: 92 U/L (ref 45–117)
ALT SERPL-CCNC: 36 U/L (ref 12–78)
ANION GAP SERPL CALC-SCNC: 8 MMOL/L (ref 5–15)
AST SERPL-CCNC: 50 U/L (ref 15–37)
BASOPHILS # BLD: 0 K/UL (ref 0–0.1)
BASOPHILS NFR BLD: 0 % (ref 0–1)
BILIRUB SERPL-MCNC: 0.7 MG/DL (ref 0.2–1)
BUN SERPL-MCNC: 10 MG/DL (ref 6–20)
BUN/CREAT SERPL: 8 (ref 12–20)
CALCIUM SERPL-MCNC: 9.7 MG/DL (ref 8.5–10.1)
CHLORIDE SERPL-SCNC: 102 MMOL/L (ref 97–108)
CHOLEST SERPL-MCNC: 215 MG/DL
CO2 SERPL-SCNC: 30 MMOL/L (ref 21–32)
CREAT SERPL-MCNC: 1.28 MG/DL (ref 0.7–1.3)
DIFFERENTIAL METHOD BLD: ABNORMAL
EOSINOPHIL # BLD: 0.1 K/UL (ref 0–0.4)
EOSINOPHIL NFR BLD: 0 % (ref 0–7)
ERYTHROCYTE [DISTWIDTH] IN BLOOD BY AUTOMATED COUNT: 15.2 % (ref 11.5–14.5)
GLOBULIN SER CALC-MCNC: 3.2 G/DL (ref 2–4)
GLUCOSE SERPL-MCNC: 125 MG/DL (ref 65–100)
HCT VFR BLD AUTO: 42.6 % (ref 36.6–50.3)
HDLC SERPL-MCNC: 72 MG/DL
HDLC SERPL: 3 {RATIO} (ref 0–5)
HGB BLD-MCNC: 14.1 G/DL (ref 12.1–17)
IMM GRANULOCYTES # BLD AUTO: 0.1 K/UL (ref 0–0.04)
IMM GRANULOCYTES NFR BLD AUTO: 1 % (ref 0–0.5)
LDLC SERPL CALC-MCNC: 121.4 MG/DL (ref 0–100)
LYMPHOCYTES # BLD: 2.6 K/UL (ref 0.8–3.5)
LYMPHOCYTES NFR BLD: 15 % (ref 12–49)
MCH RBC QN AUTO: 28.2 PG (ref 26–34)
MCHC RBC AUTO-ENTMCNC: 33.1 G/DL (ref 30–36.5)
MCV RBC AUTO: 85.2 FL (ref 80–99)
MONOCYTES # BLD: 1.5 K/UL (ref 0–1)
MONOCYTES NFR BLD: 9 % (ref 5–13)
NEUTS SEG # BLD: 12.8 K/UL (ref 1.8–8)
NEUTS SEG NFR BLD: 75 % (ref 32–75)
NRBC # BLD: 0 K/UL (ref 0–0.01)
NRBC BLD-RTO: 0 PER 100 WBC
PLATELET # BLD AUTO: 330 K/UL (ref 150–400)
PMV BLD AUTO: 10.9 FL (ref 8.9–12.9)
POTASSIUM SERPL-SCNC: 3.1 MMOL/L (ref 3.5–5.1)
PROT SERPL-MCNC: 7.7 G/DL (ref 6.4–8.2)
RBC # BLD AUTO: 5 M/UL (ref 4.1–5.7)
SODIUM SERPL-SCNC: 140 MMOL/L (ref 136–145)
TRIGL SERPL-MCNC: 108 MG/DL (ref ?–150)
VLDLC SERPL CALC-MCNC: 21.6 MG/DL
WBC # BLD AUTO: 17 K/UL (ref 4.1–11.1)

## 2021-07-14 DIAGNOSIS — E87.6 HYPOKALEMIA: Primary | ICD-10-CM

## 2021-07-14 RX ORDER — POTASSIUM CHLORIDE 750 MG/1
20 TABLET, EXTENDED RELEASE ORAL DAILY
Qty: 60 TABLET | Refills: 5 | Status: SHIPPED | OUTPATIENT
Start: 2021-07-14 | End: 2021-07-20 | Stop reason: SDUPTHER

## 2021-07-20 ENCOUNTER — HOSPITAL ENCOUNTER (EMERGENCY)
Age: 56
Discharge: HOME | End: 2021-07-20
Attending: EMERGENCY MEDICINE | Admitting: EMERGENCY MEDICINE
Payer: COMMERCIAL

## 2021-07-20 VITALS
RESPIRATION RATE: 22 BRPM | OXYGEN SATURATION: 98 % | HEART RATE: 98 BPM | SYSTOLIC BLOOD PRESSURE: 168 MMHG | TEMPERATURE: 98.1 F | DIASTOLIC BLOOD PRESSURE: 90 MMHG

## 2021-07-20 DIAGNOSIS — E87.6 HYPOKALEMIA: ICD-10-CM

## 2021-07-20 DIAGNOSIS — R60.0 BILATERAL LOWER EXTREMITY EDEMA: Primary | ICD-10-CM

## 2021-07-20 LAB
ALBUMIN SERPL-MCNC: 3.8 G/DL (ref 3.5–5)
ALBUMIN/GLOB SERPL: 1 {RATIO} (ref 1.1–2.2)
ALP SERPL-CCNC: 84 U/L (ref 45–117)
ALT SERPL-CCNC: 34 U/L (ref 12–78)
ANION GAP SERPL CALC-SCNC: 6 MMOL/L (ref 5–15)
AST SERPL-CCNC: 37 U/L (ref 15–37)
BASOPHILS # BLD: 0 K/UL (ref 0–0.1)
BASOPHILS NFR BLD: 0 % (ref 0–1)
BILIRUB SERPL-MCNC: 0.5 MG/DL (ref 0.2–1)
BUN SERPL-MCNC: 6 MG/DL (ref 6–20)
BUN/CREAT SERPL: 6 (ref 12–20)
CALCIUM SERPL-MCNC: 9.7 MG/DL (ref 8.5–10.1)
CHLORIDE SERPL-SCNC: 99 MMOL/L (ref 97–108)
CO2 SERPL-SCNC: 32 MMOL/L (ref 21–32)
COMMENT, HOLDF: NORMAL
CREAT SERPL-MCNC: 0.99 MG/DL (ref 0.7–1.3)
DIFFERENTIAL METHOD BLD: ABNORMAL
EOSINOPHIL # BLD: 0.1 K/UL (ref 0–0.4)
EOSINOPHIL NFR BLD: 1 % (ref 0–7)
ERYTHROCYTE [DISTWIDTH] IN BLOOD BY AUTOMATED COUNT: 14.6 % (ref 11.5–14.5)
GLOBULIN SER CALC-MCNC: 3.8 G/DL (ref 2–4)
GLUCOSE SERPL-MCNC: 118 MG/DL (ref 65–100)
HCT VFR BLD AUTO: 38.9 % (ref 36.6–50.3)
HGB BLD-MCNC: 13.4 G/DL (ref 12.1–17)
IMM GRANULOCYTES # BLD AUTO: 0.1 K/UL (ref 0–0.04)
IMM GRANULOCYTES NFR BLD AUTO: 1 % (ref 0–0.5)
LYMPHOCYTES # BLD: 1.8 K/UL (ref 0.8–3.5)
LYMPHOCYTES NFR BLD: 18 % (ref 12–49)
MCH RBC QN AUTO: 28.7 PG (ref 26–34)
MCHC RBC AUTO-ENTMCNC: 34.4 G/DL (ref 30–36.5)
MCV RBC AUTO: 83.3 FL (ref 80–99)
MONOCYTES # BLD: 0.9 K/UL (ref 0–1)
MONOCYTES NFR BLD: 8 % (ref 5–13)
NEUTS SEG # BLD: 7.5 K/UL (ref 1.8–8)
NEUTS SEG NFR BLD: 72 % (ref 32–75)
NRBC # BLD: 0 K/UL (ref 0–0.01)
NRBC BLD-RTO: 0 PER 100 WBC
PLATELET # BLD AUTO: 361 K/UL (ref 150–400)
PMV BLD AUTO: 9.6 FL (ref 8.9–12.9)
POTASSIUM SERPL-SCNC: 2.4 MMOL/L (ref 3.5–5.1)
PROT SERPL-MCNC: 7.6 G/DL (ref 6.4–8.2)
RBC # BLD AUTO: 4.67 M/UL (ref 4.1–5.7)
SAMPLES BEING HELD,HOLD: NORMAL
SODIUM SERPL-SCNC: 137 MMOL/L (ref 136–145)
WBC # BLD AUTO: 10.3 K/UL (ref 4.1–11.1)

## 2021-07-20 PROCEDURE — 74011250637 HC RX REV CODE- 250/637: Performed by: EMERGENCY MEDICINE

## 2021-07-20 PROCEDURE — 74011250636 HC RX REV CODE- 250/636: Performed by: EMERGENCY MEDICINE

## 2021-07-20 PROCEDURE — 85025 COMPLETE CBC W/AUTO DIFF WBC: CPT

## 2021-07-20 PROCEDURE — 36415 COLL VENOUS BLD VENIPUNCTURE: CPT

## 2021-07-20 PROCEDURE — 99283 EMERGENCY DEPT VISIT LOW MDM: CPT

## 2021-07-20 PROCEDURE — 80053 COMPREHEN METABOLIC PANEL: CPT

## 2021-07-20 PROCEDURE — 96374 THER/PROPH/DIAG INJ IV PUSH: CPT

## 2021-07-20 RX ORDER — POTASSIUM CHLORIDE 750 MG/1
20 TABLET, EXTENDED RELEASE ORAL DAILY
Qty: 60 TABLET | Refills: 5 | Status: SHIPPED | OUTPATIENT
Start: 2021-07-20 | End: 2021-07-23 | Stop reason: DRUGHIGH

## 2021-07-20 RX ORDER — FUROSEMIDE 10 MG/ML
40 INJECTION INTRAMUSCULAR; INTRAVENOUS
Status: COMPLETED | OUTPATIENT
Start: 2021-07-20 | End: 2021-07-20

## 2021-07-20 RX ORDER — FUROSEMIDE 40 MG/1
40 TABLET ORAL DAILY
Qty: 20 TABLET | Refills: 0 | Status: SHIPPED | OUTPATIENT
Start: 2021-07-20 | End: 2021-08-06 | Stop reason: ALTCHOICE

## 2021-07-20 RX ORDER — POTASSIUM CHLORIDE 750 MG/1
40 TABLET, FILM COATED, EXTENDED RELEASE ORAL
Status: COMPLETED | OUTPATIENT
Start: 2021-07-20 | End: 2021-07-20

## 2021-07-20 RX ORDER — POTASSIUM CHLORIDE 750 MG/1
10 TABLET, FILM COATED, EXTENDED RELEASE ORAL DAILY
Qty: 7 TABLET | Refills: 0 | Status: SHIPPED | OUTPATIENT
Start: 2021-07-20 | End: 2021-07-23 | Stop reason: DRUGHIGH

## 2021-07-20 RX ADMIN — POTASSIUM CHLORIDE 40 MEQ: 750 TABLET, FILM COATED, EXTENDED RELEASE ORAL at 18:11

## 2021-07-20 RX ADMIN — FUROSEMIDE 40 MG: 10 INJECTION, SOLUTION INTRAMUSCULAR; INTRAVENOUS at 18:11

## 2021-07-20 NOTE — ED PROVIDER NOTES
53-year-old male presents from home with complaint of swelling. He states symptoms have been gradually getting worse over the past week or so. Swelling is located mostly in both feet and ankles but has been spreading up his lower legs. Seems to be little bit better in the morning when he wakes up and gets worse throughout the day. He saw his primary care doctor several days ago regarding this and started on 20 mg daily Lasix which she says has been taking. He has not taken his blood pressure medication today. He states he has been peeing more than usual but has not seen any improvement in the swelling. He denies any chest pain or shortness of breath. No cough or fevers. No vomiting or diarrhea. No other complaints at this time. No history of coronary artery disease or congestive heart failure.            Past Medical History:   Diagnosis Date    [facility]     Diabetes ([facility]     ED (erectile dysfunction) 6/15/2010    Encounter for long-term (current) use of other medications 4/10/2011    Essential hypertension, benign 6/15/2010    Family history of colon cancer 2/17/2012    GERD (gastroesophageal reflux disease) 6/15/2010    Headache(784.0)     Hypertension     Migraine syndrome 6/15/2010    Mixed hyperlipidemia 4/10/2011    WESTLEY (obstructive sleep apnea) 6/15/2010       Past Surgical History:   Procedure Laterality Date    COLONOSCOPY N/A 6/7/2016    COLONOSCOPY performed by Marily Ferraro MD at . [facility] [address]  6/7/2016         HX COLONOSCOPY      HX ORTHOPAEDIC      HX UROLOGICAL           Family History:   Problem Relation Age of Onset    Diabetes Mother     Stroke Mother     Hypertension Mother     Cancer Father     Diabetes Father        Social History     Socioeconomic History    Marital status: SINGLE     Spouse name: Not on file    Number of children: Not on file    Years of education: Not on file    Highest education level: Not on file Occupational History    Not on file   Tobacco Use    Smoking status: Never Smoker    Smokeless tobacco: Never Used   Vaping Use    Vaping Use: Never used   Substance and Sexual Activity    Alcohol use: Yes     Alcohol/week: 1.7 standard drinks     Types: 2 Cans of beer per week    Drug use: No    Sexual activity: Yes     Partners: Female   Other Topics Concern    Not on file   Social History Narrative    Not on file     Social Determinants of Health     Financial Resource Strain:     Difficulty of Paying Living Expenses:    Food Insecurity:     Worried About Running Out of Food in the Last Year:     [address] in the Last Year:    Transportation Needs:     Lack of Transportation (Medical):  Lack of Transportation (Non-Medical):    Physical Activity:     Days of Exercise per Week:     Minutes of Exercise per Session:    Stress:     Feeling of Stress :    Social Connections:     Frequency of Communication with Friends and Family:     Frequency of Social Gatherings with Friends and Family:     Attends [employer]ism Services:     Active Member of Clubs or Organizations:     Attends Club or Organization Meetings:     Marital Status:    Intimate Partner Violence:     Fear of Current or Ex-Partner:     Emotionally Abused:     Physically Abused:     Sexually Abused: ALLERGIES: Ramipril    Review of Systems   Constitutional: Negative for diaphoresis and fever. HENT: Negative for facial swelling. Eyes: Negative for visual disturbance. Respiratory: Negative for cough. Cardiovascular: Negative for chest pain. Gastrointestinal: Negative for abdominal pain. Genitourinary: Negative for dysuria. Musculoskeletal: Negative for joint swelling. Skin: Negative for rash. Neurological: Negative for headaches. Hematological: Negative for adenopathy. Psychiatric/Behavioral: Negative for suicidal ideas.        Vitals:    07/20/21 1552 07/20/21 1700   BP: (!) 172/112 (!) 168/90 Pulse: 98    Resp: 22    Temp: 98.1 °F (36.7 °C)    SpO2: 95% 98%            Physical Exam  Vitals and nursing note reviewed. Constitutional:       General: He is not in acute distress. Appearance: He is well-developed. HENT:      Head: Normocephalic and atraumatic. Eyes:      Pupils: Pupils are equal, round, and reactive to light. Cardiovascular:      Rate and Rhythm: Normal rate. Pulmonary:      Effort: Pulmonary effort is normal. No respiratory distress. Abdominal:      General: There is no distension. Musculoskeletal:         General: Normal range of motion. Cervical back: Normal range of motion and neck supple. Right lower leg: Edema present. Left lower leg: Edema present. Comments: 1+ pitting edema b/l lower extremities   Skin:     General: Skin is warm and dry. Neurological:      Mental Status: He is alert and oriented to person, place, and time. MDM  Number of Diagnoses or Management Options  Bilateral lower extremity edema  Hypokalemia  Diagnosis management comments: Assessment: Patient's blood work was unremarkable except for mildly low potassium likely due to his recent Lasix use. Vital signs are stable except for elevated blood pressure not surprising given that he has not taken his blood pressure medication today. His labs are otherwise reassuring. He has no history of coronary artery disease or other reason to suspect just of heart failure. He has no shortness of breath or chest pain. I think is reasonable to continue with the Lasix treatment and increase the dose for the next few days. I also recommended the increased dose potassium. He can follow-up with his primary care doctor in 3 to 4 days.        Amount and/or Complexity of Data Reviewed  Clinical lab tests: reviewed           Procedures

## 2021-07-20 NOTE — ED TRIAGE NOTES
Pt c/o bilateral leg and feet swelling x1 week. Denies SOB. Pt reports taking medication with no improvement. Pt also reports constipation. Last BM 4 days ago.

## 2021-07-23 ENCOUNTER — OFFICE VISIT (OUTPATIENT)
Dept: FAMILY MEDICINE CLINIC | Age: 56
End: 2021-07-23
Payer: COMMERCIAL

## 2021-07-23 VITALS — DIASTOLIC BLOOD PRESSURE: 84 MMHG | SYSTOLIC BLOOD PRESSURE: 142 MMHG | HEART RATE: 68 BPM

## 2021-07-23 DIAGNOSIS — R60.0 PEDAL EDEMA: Primary | ICD-10-CM

## 2021-07-23 DIAGNOSIS — E87.6 HYPOKALEMIA: ICD-10-CM

## 2021-07-23 DIAGNOSIS — M79.10 MYALGIA: ICD-10-CM

## 2021-07-23 PROCEDURE — 99213 OFFICE O/P EST LOW 20 MIN: CPT | Performed by: FAMILY MEDICINE

## 2021-07-23 RX ORDER — LANOLIN ALCOHOL/MO/W.PET/CERES
400 CREAM (GRAM) TOPICAL DAILY
Qty: 30 TABLET | Refills: 0 | Status: SHIPPED | OUTPATIENT
Start: 2021-07-23 | End: 2022-08-16 | Stop reason: ALTCHOICE

## 2021-07-23 RX ORDER — TIZANIDINE 2 MG/1
2 TABLET ORAL 4 TIMES DAILY
Qty: 30 TABLET | Refills: 1 | Status: SHIPPED | OUTPATIENT
Start: 2021-07-23 | End: 2021-07-27 | Stop reason: ALTCHOICE

## 2021-07-23 RX ORDER — POTASSIUM CHLORIDE 750 MG/1
30 TABLET, EXTENDED RELEASE ORAL 2 TIMES DAILY
Qty: 180 TABLET | Refills: 1 | Status: SHIPPED | OUTPATIENT
Start: 2021-07-23 | End: 2021-07-27

## 2021-07-23 NOTE — PROGRESS NOTES
HISTORY OF PRESENT ILLNESS  Gorge Sal III is a 63 y.o. male. ER f/u for lower ext edema and hypokalemia. Having back spasm,improving edema. Has not gotten Potassium tablet rx filled yet  Leg Swelling  This is a chronic problem. The problem occurs daily. The problem has been gradually improving. Pertinent negatives include no chest pain. Myalgia  The history is provided by the patient. This is a new problem. The problem occurs hourly. The problem has been gradually worsening. Pertinent negatives include no chest pain. Abnormal Lab Results  The history is provided by the patient. This is a chronic problem. The problem occurs daily. Pertinent negatives include no chest pain. Review of Systems   Cardiovascular: Positive for leg swelling. Negative for chest pain, palpitations and orthopnea. Musculoskeletal: Positive for back pain and myalgias. Physical Exam  Constitutional:       Appearance: Normal appearance. He is obese. HENT:      Head: Normocephalic and atraumatic. Cardiovascular:      Rate and Rhythm: Normal rate and regular rhythm. Pulses: Normal pulses. Heart sounds: Normal heart sounds. Pulmonary:      Effort: Pulmonary effort is normal.      Breath sounds: Normal breath sounds. Musculoskeletal:      Cervical back: Normal range of motion and neck supple. Right lower leg: Edema present. Left lower leg: Edema present. Comments: 1+ edema bilateral ankles   Skin:     General: Skin is warm and dry. Neurological:      Mental Status: He is alert. Psychiatric:         Mood and Affect: Mood normal.         Behavior: Behavior normal.         ASSESSMENT and PLAN  Diagnoses and all orders for this visit:    1. Pedal edema    2. Hypokalemia,severe,start kcl 30meq tid today then bid  -     potassium chloride (KLOR-CON) 10 mEq tablet; Take 3 Tablets by mouth two (2) times a day. (Patient not taking: Reported on 7/23/2021)  -     magnesium oxide (MAG-OX) 400 mg tablet;  Take 1 Tablet by mouth daily. 3. Myalgia  -     tiZANidine (ZANAFLEX) 2 mg tablet; Take 1 Tablet by mouth four (4) times daily. Follow-up and Dispositions    · Return in about 4 days (around 7/27/2021).

## 2021-07-27 ENCOUNTER — OFFICE VISIT (OUTPATIENT)
Dept: FAMILY MEDICINE CLINIC | Age: 56
End: 2021-07-27
Payer: COMMERCIAL

## 2021-07-27 VITALS
RESPIRATION RATE: 18 BRPM | WEIGHT: 285 LBS | DIASTOLIC BLOOD PRESSURE: 96 MMHG | OXYGEN SATURATION: 99 % | BODY MASS INDEX: 48.65 KG/M2 | SYSTOLIC BLOOD PRESSURE: 144 MMHG | TEMPERATURE: 98.1 F | HEIGHT: 64 IN | HEART RATE: 92 BPM

## 2021-07-27 DIAGNOSIS — M79.10 MYALGIA: ICD-10-CM

## 2021-07-27 DIAGNOSIS — E87.6 HYPOKALEMIA: Primary | ICD-10-CM

## 2021-07-27 DIAGNOSIS — R60.0 PEDAL EDEMA: ICD-10-CM

## 2021-07-27 DIAGNOSIS — E11.9 TYPE 2 DIABETES MELLITUS WITHOUT COMPLICATION, WITHOUT LONG-TERM CURRENT USE OF INSULIN (HCC): ICD-10-CM

## 2021-07-27 PROCEDURE — 99213 OFFICE O/P EST LOW 20 MIN: CPT | Performed by: FAMILY MEDICINE

## 2021-07-27 PROCEDURE — 3051F HG A1C>EQUAL 7.0%<8.0%: CPT | Performed by: FAMILY MEDICINE

## 2021-07-27 RX ORDER — SPIRONOLACTONE 50 MG/1
50 TABLET, FILM COATED ORAL DAILY
Qty: 30 TABLET | Refills: 5 | Status: SHIPPED | OUTPATIENT
Start: 2021-07-27 | End: 2022-01-06

## 2021-07-27 RX ORDER — GLIMEPIRIDE 4 MG/1
2 TABLET ORAL
Qty: 90 TABLET | Refills: 0
Start: 2021-07-27 | End: 2021-12-19

## 2021-07-27 RX ORDER — POTASSIUM CHLORIDE 750 MG/1
20 TABLET, EXTENDED RELEASE ORAL 2 TIMES DAILY
Qty: 180 TABLET | Refills: 1
Start: 2021-07-27 | End: 2021-08-29

## 2021-07-27 NOTE — PROGRESS NOTES
HISTORY OF PRESENT ILLNESS  Sergio Villeda III is a 63 y.o. male. f/u hypokalemia,lumbago. feeling some better. Tolerating KCl well. Some persistent pedal edema  Swelling  The history is provided by the patient. This is a chronic problem. The problem occurs daily. The problem has been gradually improving. Pertinent negatives include no chest pain. Abnormal Lab Results  The history is provided by the patient. This is a new problem. The problem occurs daily. The problem has been gradually improving. Pertinent negatives include no chest pain. Review of Systems   Constitutional: Negative for fever and malaise/fatigue. Cardiovascular: Negative for chest pain, claudication and leg swelling. Musculoskeletal: Positive for back pain. Physical Exam  Constitutional:       Appearance: Normal appearance. HENT:      Head: Normocephalic and atraumatic. Right Ear: Tympanic membrane normal.      Left Ear: Tympanic membrane normal.   Cardiovascular:      Rate and Rhythm: Normal rate and regular rhythm. Pulses: Normal pulses. Pulmonary:      Effort: Pulmonary effort is normal.   Musculoskeletal:         General: Swelling present. Cervical back: Normal range of motion and neck supple. Lumbar back: Spasms and tenderness present. Negative right straight leg raise test and negative left straight leg raise test.      Right lower leg: Edema present. Left lower leg: Edema present. Neurological:      Mental Status: He is alert. ASSESSMENT and PLAN  Diagnoses and all orders for this visit:    1. Hypokalemia,unable to draw blood today,will try on next visit  -     potassium chloride (KLOR-CON) 10 mEq tablet; Take 2 Tablets by mouth two (2) times a day. -     METABOLIC PANEL, BASIC; Future    2. Myalgia    3. Type 2 diabetes mellitus without complication, without long-term current use of insulin ([facility])  -     glimepiride (AMARYL) 4 mg tablet; Take 0.5 Tablets by mouth every morning.     4. Pedal edema  -     spironolactone (ALDACTONE) 50 mg tablet; Take 1 Tablet by mouth daily. Follow-up and Dispositions    · Return in about 2 weeks (around 8/10/2021).

## 2021-07-27 NOTE — LETTER
NOTIFICATION OF RETURN TO WORK / SCHOOL    7/27/2021 10:59 AM    Mr. Antonieta Upton  [address] [facility] [address] [phone]  . To Whom It May Concern:    Caron Mitchell III was under the care of [facility] from 7/7/21. He will be able to return to work/school on 8/2/21 with no restrictions. If there are questions or concerns please have the patient contact our office.     Sincerely,      Olamide Lea MD

## 2021-07-27 NOTE — PROGRESS NOTES
Chief Complaint   Patient presents with    Swelling     F/U for swelling in danae feet and legs. 1. Have you been to the ER, urgent care clinic since your last visit? Hospitalized since your last visit? No    2. Have you seen or consulted any other health care providers outside of the [address] [place] [address] since your last visit? Include any pap smears or colon screening.  No

## 2021-07-30 DIAGNOSIS — R60.0 PEDAL EDEMA: ICD-10-CM

## 2021-07-30 RX ORDER — FUROSEMIDE 20 MG/1
20 TABLET ORAL
Qty: 30 TABLET | Refills: 1 | Status: SHIPPED | OUTPATIENT
Start: 2021-07-30 | End: 2021-08-12 | Stop reason: SDUPTHER

## 2021-08-06 ENCOUNTER — OFFICE VISIT (OUTPATIENT)
Dept: FAMILY MEDICINE CLINIC | Age: 56
End: 2021-08-06
Payer: COMMERCIAL

## 2021-08-06 VITALS
WEIGHT: 275.8 LBS | RESPIRATION RATE: 18 BRPM | SYSTOLIC BLOOD PRESSURE: 156 MMHG | OXYGEN SATURATION: 98 % | BODY MASS INDEX: 47.08 KG/M2 | TEMPERATURE: 98.3 F | HEIGHT: 64 IN | HEART RATE: 100 BPM | DIASTOLIC BLOOD PRESSURE: 86 MMHG

## 2021-08-06 DIAGNOSIS — G47.33 OSA (OBSTRUCTIVE SLEEP APNEA): ICD-10-CM

## 2021-08-06 DIAGNOSIS — E87.6 HYPOKALEMIA: ICD-10-CM

## 2021-08-06 DIAGNOSIS — R40.0 DROWSINESS: Primary | ICD-10-CM

## 2021-08-06 DIAGNOSIS — G47.9 SLEEP DISORDER: ICD-10-CM

## 2021-08-06 DIAGNOSIS — R60.0 BILATERAL LEG EDEMA: ICD-10-CM

## 2021-08-06 LAB
ANION GAP SERPL CALC-SCNC: 4 MMOL/L (ref 5–15)
BUN SERPL-MCNC: 8 MG/DL (ref 6–20)
BUN/CREAT SERPL: 7 (ref 12–20)
CALCIUM SERPL-MCNC: 9.4 MG/DL (ref 8.5–10.1)
CHLORIDE SERPL-SCNC: 104 MMOL/L (ref 97–108)
CO2 SERPL-SCNC: 32 MMOL/L (ref 21–32)
CREAT SERPL-MCNC: 1.11 MG/DL (ref 0.7–1.3)
GLUCOSE SERPL-MCNC: 155 MG/DL (ref 65–100)
MAGNESIUM SERPL-MCNC: 2.6 MG/DL (ref 1.6–2.4)
POTASSIUM SERPL-SCNC: 3.9 MMOL/L (ref 3.5–5.1)
SODIUM SERPL-SCNC: 140 MMOL/L (ref 136–145)

## 2021-08-06 PROCEDURE — 99213 OFFICE O/P EST LOW 20 MIN: CPT | Performed by: FAMILY MEDICINE

## 2021-08-06 RX ORDER — ZOLPIDEM TARTRATE 5 MG/1
5 TABLET ORAL
Qty: 30 TABLET | Refills: 0 | Status: SHIPPED | OUTPATIENT
Start: 2021-08-06 | End: 2022-08-16 | Stop reason: ALTCHOICE

## 2021-08-06 NOTE — LETTER
NOTIFICATION OF RETURN TO WORK / SCHOOL    8/6/2021 11:35 AM    Mr. Lori Hargrove  [address] [facility] [id number]  . To Whom It May Concern:    Esme Camacho III was under the care of [facility] from 7/7/21. He will be able to return to work/school on 8/9/21 with no restrictions. If there are questions or concerns please have the patient contact our office.     Sincerely,      Lenin Paulino MD

## 2021-08-06 NOTE — PROGRESS NOTES
Chief Complaint   Patient presents with    Swelling     F/u on danae foot swelling.  Sleep Problem     Pt state he is not sleeping and falling asleep while driving. 1. Have you been to the ER, urgent care clinic since your last visit? Hospitalized since your last visit? No    2. Have you seen or consulted any other health care providers outside of the [address] [place] [address] [place] since your last visit? Include any pap smears or colon screening.  no

## 2021-08-06 NOTE — LETTER
NOTIFICATION OF RETURN TO WORK / SCHOOL    8/6/2021 11:11 AM    Mr. Klaus Villaseñor  [facility] [address]. [phone]  . To Whom It May Concern:    Chidi Stone III was under the care of [facility] from 8/2/21. He will be able to return to work/school on 8/9/21 with no restrictions. If there are questions or concerns please have the patient contact our office.     Sincerely,      Avinash Schultz MD

## 2021-08-06 NOTE — PROGRESS NOTES
HISTORY OF PRESENT ILLNESS  Clarisa Mosley III is a 63 y.o. male. f/u hypokalemia, BLE edema ,lumbago. feeling some better. Having increased stress and insomnia with daytime drowsiness related to care for his ill mother. Has nodded while driving x 2Tolerating KCl well. Some persistent pedal edema  Swelling  The history is provided by the patient. This is a chronic problem. The problem occurs daily. The problem has been gradually improving. Pertinent negatives include no chest pain. Abnormal Lab Results  The history is provided by the patient. This is a new problem. The problem occurs daily. The problem has been gradually improving. Pertinent negatives include no chest pain. Sleep Problem  The history is provided by the patient. This is a chronic problem. The problem occurs daily. The problem has not changed since onset. Pertinent negatives include no chest pain. Review of Systems   Constitutional: Negative for fever and malaise/fatigue. Cardiovascular: Negative for chest pain, claudication and leg swelling. Musculoskeletal: Positive for back pain. Physical Exam  Constitutional:       Appearance: Normal appearance. HENT:      Head: Normocephalic and atraumatic. Right Ear: Tympanic membrane normal.      Left Ear: Tympanic membrane normal.      Nose: Nose normal.      Mouth/Throat:      Mouth: Mucous membranes are moist.   Cardiovascular:      Rate and Rhythm: Normal rate and regular rhythm. Pulses: Normal pulses. Pulmonary:      Effort: Pulmonary effort is normal.   Musculoskeletal:         General: Swelling present. Cervical back: Normal range of motion and neck supple. Lumbar back: Spasms and tenderness present. Negative right straight leg raise test and negative left straight leg raise test.      Right lower leg: Edema present. Left lower leg: Edema present. Neurological:      Mental Status: He is alert. Diagnoses and all orders for this visit:    1. Drowsiness    2. Sleep disorder  -     zolpidem (AMBIEN) 5 mg tablet; Take 1 Tablet by mouth nightly as needed for Sleep. Max Daily Amount: 5 mg.    3. WESTLEY (obstructive sleep apnea)    4. Hypokalemia  -     METABOLIC PANEL, BASIC; Future  -     MAGNESIUM; Future    5. Bilateral leg edema      Follow-up and Dispositions    · Return in about 2 weeks (around 8/20/2021). .      Follow-up and Dispositions    · Return in about 2 weeks (around 8/20/2021).

## 2021-08-09 PROBLEM — R60.0 BILATERAL LOWER EXTREMITY EDEMA: Status: ACTIVE | Noted: 2021-08-09

## 2021-08-09 PROBLEM — E87.6 HYPOKALEMIA: Status: ACTIVE | Noted: 2021-08-09

## 2021-08-12 DIAGNOSIS — R60.0 PEDAL EDEMA: ICD-10-CM

## 2021-08-13 RX ORDER — FUROSEMIDE 20 MG/1
20 TABLET ORAL
Qty: 90 TABLET | Refills: 1 | Status: SHIPPED | OUTPATIENT
Start: 2021-08-13 | End: 2022-02-23 | Stop reason: ALTCHOICE

## 2021-08-25 ENCOUNTER — HOSPITAL ENCOUNTER (EMERGENCY)
Age: 56
Discharge: HOME | End: 2021-08-25
Attending: EMERGENCY MEDICINE
Payer: COMMERCIAL

## 2021-08-25 ENCOUNTER — APPOINTMENT (OUTPATIENT)
Dept: GENERAL RADIOLOGY | Age: 56
End: 2021-08-25
Attending: STUDENT IN AN ORGANIZED HEALTH CARE EDUCATION/TRAINING PROGRAM
Payer: COMMERCIAL

## 2021-08-25 VITALS
TEMPERATURE: 97.4 F | HEART RATE: 101 BPM | SYSTOLIC BLOOD PRESSURE: 153 MMHG | RESPIRATION RATE: 16 BRPM | OXYGEN SATURATION: 96 % | DIASTOLIC BLOOD PRESSURE: 98 MMHG

## 2021-08-25 DIAGNOSIS — M25.551 RIGHT HIP PAIN: ICD-10-CM

## 2021-08-25 DIAGNOSIS — M16.0 BILATERAL PRIMARY OSTEOARTHRITIS OF HIP: Primary | ICD-10-CM

## 2021-08-25 PROCEDURE — 74011250637 HC RX REV CODE- 250/637: Performed by: STUDENT IN AN ORGANIZED HEALTH CARE EDUCATION/TRAINING PROGRAM

## 2021-08-25 PROCEDURE — 99283 EMERGENCY DEPT VISIT LOW MDM: CPT

## 2021-08-25 PROCEDURE — 73502 X-RAY EXAM HIP UNI 2-3 VIEWS: CPT

## 2021-08-25 RX ORDER — IBUPROFEN 400 MG/1
800 TABLET ORAL
Status: COMPLETED | OUTPATIENT
Start: 2021-08-25 | End: 2021-08-25

## 2021-08-25 RX ORDER — NAPROXEN 500 MG/1
500 TABLET ORAL 2 TIMES DAILY WITH MEALS
Qty: 20 TABLET | Refills: 0 | Status: SHIPPED | OUTPATIENT
Start: 2021-08-25 | End: 2021-09-04

## 2021-08-25 RX ADMIN — IBUPROFEN 800 MG: 400 TABLET, FILM COATED ORAL at 12:05

## 2021-08-25 NOTE — ED TRIAGE NOTES
Patient presents from home with complaints of right hip pain for the last week or 2.  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

## 2021-08-25 NOTE — ED PROVIDER NOTES
Patient is a 63year old male who presents to ED c/o right hip pain that started 2 weeks prior. Reports pain is to lateral right hip, exacerbated by walking, not alleviated by any factors. Denies any specific injury but states he has been taking care of his mom and moving her a lot and thinks this may have exacerbated his arthritis. He denies any back pain, fever, chills, groin pain, leg numbness or weakness. Past Medical History:   Diagnosis Date    [facility] ([facility]     Diabetes ([facility]     ED (erectile dysfunction) 6/15/2010    Encounter for long-term (current) use of other medications 4/10/2011    Essential hypertension, benign 6/15/2010    Family history of colon cancer 2/17/2012    GERD (gastroesophageal reflux disease) 6/15/2010    Headache(784.0)     Hypertension     Migraine syndrome 6/15/2010    Mixed hyperlipidemia 4/10/2011    WESTLEY (obstructive sleep apnea) 6/15/2010       Past Surgical History:   Procedure Laterality Date    COLONOSCOPY N/A 6/7/2016    COLONOSCOPY performed by Manish West MD at . [facility] [address]  6/7/2016         HX COLONOSCOPY      HX ORTHOPAEDIC      HX UROLOGICAL           Family History:   Problem Relation Age of Onset    Diabetes Mother     Stroke Mother     Hypertension Mother     Cancer Father     Diabetes Father        Social History     Socioeconomic History    Marital status: SINGLE     Spouse name: Not on file    Number of children: Not on file    Years of education: Not on file    Highest education level: Not on file   Occupational History    Not on file   Tobacco Use    Smoking status: Never Smoker    Smokeless tobacco: Never Used   Vaping Use    Vaping Use: Never used   Substance and Sexual Activity    Alcohol use:  Yes     Alcohol/week: 1.7 standard drinks     Types: 2 Cans of beer per week    Drug use: No    Sexual activity: Yes     Partners: Female   Other Topics Concern    Not on file   Social History Narrative    Not on file     Social Determinants of Health     Financial Resource Strain:     Difficulty of Paying Living Expenses:    Food Insecurity:     Worried About Running Out of Food in the Last Year:     [address] in the Last Year:    Transportation Needs:     Lack of Transportation (Medical):  Lack of Transportation (Non-Medical):    Physical Activity:     Days of Exercise per Week:     Minutes of Exercise per Session:    Stress:     Feeling of Stress :    Social Connections:     Frequency of Communication with Friends and Family:     Frequency of Social Gatherings with Friends and Family:     Attends Taoist Services:     Active Member of Clubs or Organizations:     Attends Club or Organization Meetings:     Marital Status:    Intimate Partner Violence:     Fear of Current or Ex-Partner:     Emotionally Abused:     Physically Abused:     Sexually Abused: ALLERGIES: Ramipril    Review of Systems   Constitutional: Negative for chills and fever. Musculoskeletal: Positive for arthralgias. Negative for back pain, gait problem, joint swelling, myalgias, neck pain and neck stiffness. Allergic/Immunologic: Negative for immunocompromised state. Neurological: Negative for weakness and numbness. Hematological: Does not bruise/bleed easily. All other systems reviewed and are negative. Vitals:    08/25/21 1130   BP: (!) 155/100   Pulse: (!) 101   Resp: 16   Temp: 97.4 °F (36.3 °C)   SpO2: 96%            Physical Exam  Vitals and nursing note reviewed. Constitutional:       Appearance: Normal appearance. He is well-developed. HENT:      Head: Normocephalic and atraumatic. Nose: Nose normal.      Mouth/Throat:      Mouth: Mucous membranes are moist.   Eyes:      General: Lids are normal.      Extraocular Movements: Extraocular movements intact.       Conjunctiva/sclera: Conjunctivae normal.   Cardiovascular:      Rate and Rhythm: Normal rate and regular rhythm. Pulses: Normal pulses. Heart sounds: Normal heart sounds, S1 normal and S2 normal.   Pulmonary:      Effort: Pulmonary effort is normal. No accessory muscle usage. Abdominal:      General: Abdomen is flat. Palpations: Abdomen is soft. Musculoskeletal:         General: Normal range of motion. Cervical back: Normal range of motion and neck supple. Comments: No lumbar tenderness noted. Tenderness to right lateral hip. Full ROM of bilateral lower extremities. Ambulates without difficulty. Distal pulses 2+. NVI distally. Skin:     General: Skin is warm and dry. Capillary Refill: Capillary refill takes less than 2 seconds. Neurological:      General: No focal deficit present. Mental Status: He is alert and oriented to person, place, and time. Mental status is at baseline. Psychiatric:         Attention and Perception: Attention normal.         Mood and Affect: Mood and affect normal.         Speech: Speech normal.         Behavior: Behavior normal. Behavior is cooperative. Thought Content: Thought content normal.         Cognition and Memory: Cognition normal.         Judgment: Judgment normal.          MDM  Number of Diagnoses or Management Options  Bilateral primary osteoarthritis of hip  Right hip pain  Diagnosis management comments: XR shows bilateral hip osteoarthritis. Will give rx for naprosyn and follow-up with ortho. Patient understands and agrees with plan. All questions addressed and answered.         Amount and/or Complexity of Data Reviewed  Tests in the radiology section of CPT®: reviewed  Discuss the patient with other providers: yes (Dr. Deangelo Samano, ED Attending )           Procedures

## 2021-08-28 DIAGNOSIS — E87.6 HYPOKALEMIA: ICD-10-CM

## 2021-08-29 RX ORDER — POTASSIUM CHLORIDE 750 MG/1
TABLET, EXTENDED RELEASE ORAL
Qty: 180 TABLET | Refills: 1 | Status: SHIPPED | OUTPATIENT
Start: 2021-08-29 | End: 2022-08-16 | Stop reason: ALTCHOICE

## 2021-09-24 DIAGNOSIS — I10 ESSENTIAL HYPERTENSION, BENIGN: ICD-10-CM

## 2021-09-24 RX ORDER — LOSARTAN POTASSIUM 100 MG/1
TABLET ORAL
Qty: 90 TABLET | Refills: 3 | Status: SHIPPED | OUTPATIENT
Start: 2021-09-24 | End: 2022-03-11 | Stop reason: ALTCHOICE

## 2021-10-18 ENCOUNTER — TELEPHONE (OUTPATIENT)
Dept: FAMILY MEDICINE CLINIC | Age: 56
End: 2021-10-18

## 2021-10-18 LAB
ALBUMIN SERPL-MCNC: 3.4 G/DL (ref 3.5–5)
ALBUMIN/GLOB SERPL: 0.8 {RATIO} (ref 1.1–2.2)
ALP SERPL-CCNC: 132 U/L (ref 45–117)
ALT SERPL-CCNC: 30 U/L (ref 12–78)
AMPHET UR QL SCN: NEGATIVE
ANION GAP SERPL CALC-SCNC: 4 MMOL/L (ref 5–15)
APPEARANCE UR: CLEAR
AST SERPL-CCNC: 22 U/L (ref 15–37)
BACTERIA URNS QL MICRO: NEGATIVE /HPF
BARBITURATES UR QL SCN: NEGATIVE
BASOPHILS # BLD: 0 K/UL (ref 0–0.1)
BASOPHILS NFR BLD: 0 % (ref 0–1)
BENZODIAZ UR QL: NEGATIVE
BILIRUB SERPL-MCNC: 0.2 MG/DL (ref 0.2–1)
BILIRUB UR QL: NEGATIVE
BUN SERPL-MCNC: 11 MG/DL (ref 6–20)
BUN/CREAT SERPL: 9 (ref 12–20)
CALCIUM SERPL-MCNC: 9.8 MG/DL (ref 8.5–10.1)
CANNABINOIDS UR QL SCN: NEGATIVE
CHLORIDE SERPL-SCNC: 102 MMOL/L (ref 97–108)
CO2 SERPL-SCNC: 34 MMOL/L (ref 21–32)
COCAINE UR QL SCN: POSITIVE
COLOR UR: ABNORMAL
CREAT SERPL-MCNC: 1.27 MG/DL (ref 0.7–1.3)
DIFFERENTIAL METHOD BLD: ABNORMAL
DRUG SCRN COMMENT,DRGCM: ABNORMAL
EOSINOPHIL # BLD: 0.1 K/UL (ref 0–0.4)
EOSINOPHIL NFR BLD: 1 % (ref 0–7)
EPITH CASTS URNS QL MICRO: ABNORMAL /LPF
ERYTHROCYTE [DISTWIDTH] IN BLOOD BY AUTOMATED COUNT: 13.7 % (ref 11.5–14.5)
GLOBULIN SER CALC-MCNC: 4.2 G/DL (ref 2–4)
GLUCOSE SERPL-MCNC: 276 MG/DL (ref 65–100)
GLUCOSE UR STRIP.AUTO-MCNC: >1000 MG/DL
HCT VFR BLD AUTO: 42.6 % (ref 36.6–50.3)
HGB BLD-MCNC: 14.2 G/DL (ref 12.1–17)
HGB UR QL STRIP: NEGATIVE
HYALINE CASTS URNS QL MICRO: ABNORMAL /LPF (ref 0–5)
IMM GRANULOCYTES # BLD AUTO: 0.1 K/UL (ref 0–0.04)
IMM GRANULOCYTES NFR BLD AUTO: 1 % (ref 0–0.5)
KETONES UR QL STRIP.AUTO: NEGATIVE MG/DL
LEUKOCYTE ESTERASE UR QL STRIP.AUTO: NEGATIVE
LYMPHOCYTES # BLD: 2.4 K/UL (ref 0.8–3.5)
LYMPHOCYTES NFR BLD: 18 % (ref 12–49)
MAGNESIUM SERPL-MCNC: 2.5 MG/DL (ref 1.6–2.4)
MCH RBC QN AUTO: 27.4 PG (ref 26–34)
MCHC RBC AUTO-ENTMCNC: 33.3 G/DL (ref 30–36.5)
MCV RBC AUTO: 82.1 FL (ref 80–99)
METHADONE UR QL: NEGATIVE
MONOCYTES # BLD: 1.1 K/UL (ref 0–1)
MONOCYTES NFR BLD: 8 % (ref 5–13)
NEUTS SEG # BLD: 9.5 K/UL (ref 1.8–8)
NEUTS SEG NFR BLD: 72 % (ref 32–75)
NITRITE UR QL STRIP.AUTO: NEGATIVE
NRBC # BLD: 0 K/UL (ref 0–0.01)
NRBC BLD-RTO: 0 PER 100 WBC
OPIATES UR QL: NEGATIVE
PCP UR QL: NEGATIVE
PH UR STRIP: 6 [PH] (ref 5–8)
PLATELET # BLD AUTO: 393 K/UL (ref 150–400)
PMV BLD AUTO: 9.8 FL (ref 8.9–12.9)
POTASSIUM SERPL-SCNC: 3.5 MMOL/L (ref 3.5–5.1)
PROT SERPL-MCNC: 7.6 G/DL (ref 6.4–8.2)
PROT UR STRIP-MCNC: NEGATIVE MG/DL
RBC # BLD AUTO: 5.19 M/UL (ref 4.1–5.7)
RBC #/AREA URNS HPF: ABNORMAL /HPF (ref 0–5)
SODIUM SERPL-SCNC: 140 MMOL/L (ref 136–145)
SP GR UR REFRACTOMETRY: 1.02 (ref 1–1.03)
UA: UC IF INDICATED,UAUC: ABNORMAL
UROBILINOGEN UR QL STRIP.AUTO: 1 EU/DL (ref 0.2–1)
WBC # BLD AUTO: 13.2 K/UL (ref 4.1–11.1)
WBC URNS QL MICRO: ABNORMAL /HPF (ref 0–4)

## 2021-10-18 PROCEDURE — 81001 URINALYSIS AUTO W/SCOPE: CPT

## 2021-10-18 PROCEDURE — 85025 COMPLETE CBC W/AUTO DIFF WBC: CPT

## 2021-10-18 PROCEDURE — 36415 COLL VENOUS BLD VENIPUNCTURE: CPT

## 2021-10-18 PROCEDURE — 83735 ASSAY OF MAGNESIUM: CPT

## 2021-10-18 PROCEDURE — 99284 EMERGENCY DEPT VISIT MOD MDM: CPT

## 2021-10-18 PROCEDURE — 80307 DRUG TEST PRSMV CHEM ANLYZR: CPT

## 2021-10-18 PROCEDURE — 80053 COMPREHEN METABOLIC PANEL: CPT

## 2021-10-18 NOTE — TELEPHONE ENCOUNTER
----- Message from Og Disha sent at 10/18/2021  3:28 PM EDT -----  Subject: Message to Provider    QUESTIONS  Information for Provider? pt is going to the ED for addiction and would   like Dr. Mara Jara to call him   ---------------------------------------------------------------------------  --------------  [address]  What is the best way for the office to contact you? OK to leave message on   voicemail  Preferred Call Back Phone Number? [phone]  ---------------------------------------------------------------------------  --------------  SCRIPT ANSWERS  Relationship to Patient?  Self

## 2021-10-19 ENCOUNTER — APPOINTMENT (OUTPATIENT)
Dept: CT IMAGING | Age: 56
End: 2021-10-19
Attending: EMERGENCY MEDICINE
Payer: COMMERCIAL

## 2021-10-19 ENCOUNTER — HOSPITAL ENCOUNTER (EMERGENCY)
Age: 56
Discharge: HOME | End: 2021-10-19
Attending: EMERGENCY MEDICINE
Payer: COMMERCIAL

## 2021-10-19 VITALS
HEIGHT: 64 IN | SYSTOLIC BLOOD PRESSURE: 166 MMHG | HEART RATE: 101 BPM | RESPIRATION RATE: 19 BRPM | OXYGEN SATURATION: 99 % | DIASTOLIC BLOOD PRESSURE: 97 MMHG | BODY MASS INDEX: 45.81 KG/M2 | TEMPERATURE: 98.2 F | WEIGHT: 268.3 LBS

## 2021-10-19 DIAGNOSIS — F14.10 COCAINE ABUSE (HCC): Primary | ICD-10-CM

## 2021-10-19 PROCEDURE — 70450 CT HEAD/BRAIN W/O DYE: CPT

## 2021-10-19 PROCEDURE — 74011250636 HC RX REV CODE- 250/636: Performed by: EMERGENCY MEDICINE

## 2021-10-19 RX ADMIN — SODIUM CHLORIDE 500 ML: 9 INJECTION, SOLUTION INTRAVENOUS at 03:02

## 2021-10-19 NOTE — Clinical Note
[facility] EMERGENCY DEPT  [address]  [place] [id number] [phone]    Work/School Note    Date: 10/18/2021    To Whom It May concern:      Keegan Mccallum III was seen and treated today in the emergency room by the following provider(s):  Attending Provider: Sandrine Carter, [email or web address] JUAN is excused from work/school on 10/19/21. He is clear to return to work/school on 10/20/21.         Sincerely,          Yun Chanel, DO

## 2021-10-19 NOTE — ED PROVIDER NOTES
EMERGENCY DEPARTMENT HISTORY AND PHYSICAL EXAM      Date: 10/19/2021  Patient Name: Tia Castillo III    History of Presenting Illness     Chief Complaint   Patient presents with    Other     Pt wants help detoxing from crack. Pt is not having any sx       History Provided By: Patient    HPI: Vivian Hernandez, 61 y.o. male presents to the ED with cc of hx of cocaine use and dependency. Pt states he had been clean for some time however he began using again secondary to life and social stressors. He states use minimum three times a week. He feels that he may need help in stopping. He has had some auditory hallucinations and mild dull headache intermittently. There are no alleviating or exacerbating factors. He denies any fever chills. There is no chest pain, SOA, abd pain, n/v/d. He denies any palpitations. There has been no diaphoresis. He denies any SI/HI. There are no other complaints, changes, or physical findings at this time. PCP: Cece Ramirez MD    No current facility-administered medications on file prior to encounter. Current Outpatient Medications on File Prior to Encounter   Medication Sig Dispense Refill    losartan (COZAAR) 100 mg tablet TAKE 1 TABLET BY MOUTH EVERY DAY 90 Tablet 3    Klor-Con M10 10 mEq tablet TAKE 3 TABLETS BY MOUTH TWO (2) TIMES A DAY. 180 Tablet 1    furosemide (LASIX) 20 mg tablet Take 1 Tablet by mouth daily as needed (edema). 90 Tablet 1    zolpidem (AMBIEN) 5 mg tablet Take 1 Tablet by mouth nightly as needed for Sleep. Max Daily Amount: 5 mg. 30 Tablet 0    spironolactone (ALDACTONE) 50 mg tablet Take 1 Tablet by mouth daily. 30 Tablet 5    glimepiride (AMARYL) 4 mg tablet Take 0.5 Tablets by mouth every morning. 90 Tablet 0    magnesium oxide (MAG-OX) 400 mg tablet Take 1 Tablet by mouth daily. 30 Tablet 0    Accu-Chek Xochilt Plus test strp strip USE TO TEST BLOOD DAILY (BEFORE BREAKFAST).  100 Strip 1    dilTIAZem ER (CARDIZEM CD) 300 mg capsule TAKE 1 CAPSULE BY MOUTH EVERY DAY 90 Capsule 2    allopurinoL (ZYLOPRIM) 100 mg tablet Take 1 Tab by mouth daily for 360 days. 30 Tab 11    tadalafiL (CIALIS) 20 mg tablet TAKE 1 TABLET BY MOUTH EVERY DAY AS NEEDED 6 Tab 11    Accu-Chek Xochilt Plus Meter misc Test blood sugar daily before breakfast. 1 Each 0    HYDROcodone-acetaminophen (NORCO) 5-325 mg per tablet TAKE ONE TABLET BY MOUTH 3 TIMES PER DAY FOR 2 DAYS (Patient not taking: Reported on 7/8/2021)      Blood-Glucose Meter monitoring kit As directed,daily,fasting 1 Kit 0    glucose blood VI test strips (BLOOD GLUCOSE TEST) strip by Does Not Apply route Daily (before breakfast). 100 Strip 2    lancets (ONE TOUCH DELICA) 33 gauge misc by Does Not Apply route daily. Check blood sugars once daily 100 Lancet 3    glucose blood VI test strips (ONETOUCH VERIO) strip by Does Not Apply route daily.  Check blood sugars once daily 50 Strip 11       Past History     Past Medical History:  Past Medical History:   Diagnosis Date    [facility] [address]     Diabetes ([facility] [address]     ED (erectile dysfunction) 6/15/2010    Encounter for long-term (current) use of other medications 4/10/2011    Essential hypertension, benign 6/15/2010    Family history of colon cancer 2/17/2012    GERD (gastroesophageal reflux disease) 6/15/2010    Headache(784.0)     Hypertension     Migraine syndrome 6/15/2010    Mixed hyperlipidemia 4/10/2011    WESTLEY (obstructive sleep apnea) 6/15/2010       Past Surgical History:  Past Surgical History:   Procedure Laterality Date    COLONOSCOPY N/A 6/7/2016    COLONOSCOPY performed by Natali Seay MD at [facility]CA[facility]ERY  6/7/2016         HX COLONOSCOPY      HX ORTHOPAEDIC      HX UROLOGICAL         Family History:  Family History   Problem Relation Age of Onset    Diabetes Mother     Stroke Mother     Hypertension Mother     Cancer Father     Diabetes Father        Social History:  Social History Tobacco Use    Smoking status: Never Smoker    Smokeless tobacco: Never Used   Vaping Use    Vaping Use: Never used   Substance Use Topics    Alcohol use: Yes     Alcohol/week: 1.7 standard drinks     Types: 2 Cans of beer per week    Drug use: No       Allergies: Allergies   Allergen Reactions    Ramipril Cough         Review of Systems   Review of Systems   Constitutional: Negative. Negative for appetite change, chills, fatigue and fever. HENT: Negative. Negative for congestion, rhinorrhea, sinus pressure and sore throat. Eyes: Negative. Respiratory: Negative. Negative for cough, choking, chest tightness, shortness of breath and wheezing. Cardiovascular: Negative. Negative for chest pain, palpitations and leg swelling. Gastrointestinal: Negative for abdominal pain, constipation, diarrhea, nausea and vomiting. Endocrine: Negative. Genitourinary: Negative. Negative for difficulty urinating, dysuria, flank pain and urgency. Musculoskeletal: Negative. Skin: Negative. Neurological: Positive for headaches. Negative for dizziness, speech difficulty, weakness, light-headedness and numbness. Psychiatric/Behavioral: Positive for hallucinations and sleep disturbance. Hx of Cocaine use and dependency      All other systems reviewed and are negative. Physical Exam   Physical Exam  Vitals and nursing note reviewed. Constitutional:       General: He is not in acute distress. Appearance: He is well-developed. He is not diaphoretic. Comments: Morbidly obese     HENT:      Head: Normocephalic and atraumatic. Mouth/Throat:      Mouth: Mucous membranes are dry. Pharynx: No oropharyngeal exudate. Eyes:      Extraocular Movements: Extraocular movements intact. Conjunctiva/sclera: Conjunctivae normal.      Pupils: Pupils are equal, round, and reactive to light. Neck:      Vascular: No JVD. Trachea: No tracheal deviation.    Cardiovascular: Rate and Rhythm: Normal rate and regular rhythm. Heart sounds: Normal heart sounds. No murmur heard. Pulmonary:      Effort: Pulmonary effort is normal. No respiratory distress. Breath sounds: Normal breath sounds. No stridor. No wheezing or rales. Abdominal:      General: There is no distension. Palpations: Abdomen is soft. Tenderness: There is no abdominal tenderness. There is no guarding or rebound. Musculoskeletal:         General: No tenderness. Normal range of motion. Cervical back: Normal range of motion and neck supple. Right lower leg: No edema. Left lower leg: No edema. Skin:     General: Skin is warm and dry. Capillary Refill: Capillary refill takes less than 2 seconds. Neurological:      Mental Status: He is alert and oriented to person, place, and time. Cranial Nerves: No cranial nerve deficit. Comments: No gross motor or sensory deficits    Psychiatric:         Behavior: Behavior normal.      Comments: Substance use/depndence, no SI/HI, good insight         Diagnostic Study Results     Labs -     Recent Results (from the past 12 hour(s))   CBC WITH AUTOMATED DIFF    Collection Time: 10/18/21  8:46 PM   Result Value Ref Range    WBC 13.2 (H) 4.1 - 11.1 K/uL    RBC 5.19 4. 10 - 5.70 M/uL    HGB 14.2 12.1 - 17.0 g/dL    HCT 42.6 36.6 - 50.3 %    MCV 82.1 80.0 - 99.0 FL    MCH 27.4 26.0 - 34.0 PG    MCHC 33.3 30.0 - 36.5 g/dL    RDW 13.7 11.5 - 14.5 %    PLATELET 142 415 - 498 K/uL    MPV 9.8 8.9 - 12.9 FL    NRBC 0.0 0  WBC    ABSOLUTE NRBC 0.00 0.00 - 0.01 K/uL    NEUTROPHILS 72 32 - 75 %    LYMPHOCYTES 18 12 - 49 %    MONOCYTES 8 5 - 13 %    EOSINOPHILS 1 0 - 7 %    BASOPHILS 0 0 - 1 %    IMMATURE GRANULOCYTES 1 (H) 0.0 - 0.5 %    ABS. NEUTROPHILS 9.5 (H) 1.8 - 8.0 K/UL    ABS. LYMPHOCYTES 2.4 0.8 - 3.5 K/UL    ABS. MONOCYTES 1.1 (H) 0.0 - 1.0 K/UL    ABS. EOSINOPHILS 0.1 0.0 - 0.4 K/UL    ABS. BASOPHILS 0.0 0.0 - 0.1 K/UL    ABS. IMM. GRANS. 0.1 (H) 0.00 - 0.04 K/UL    DF AUTOMATED     METABOLIC PANEL, COMPREHENSIVE    Collection Time: 10/18/21  8:46 PM   Result Value Ref Range    Sodium 140 136 - 145 mmol/L    Potassium 3.5 3.5 - 5.1 mmol/L    Chloride 102 97 - 108 mmol/L    CO2 34 (H) 21 - 32 mmol/L    Anion gap 4 (L) 5 - 15 mmol/L    Glucose 276 (H) 65 - 100 mg/dL    BUN 11 6 - 20 MG/DL    Creatinine 1.27 0.70 - 1.30 MG/DL    BUN/Creatinine ratio 9 (L) 12 - 20      GFR est AA >60 >60 ml/min/1.73m2    GFR est non-AA 58 (L) >60 ml/min/1.73m2    Calcium 9.8 8.5 - 10.1 MG/DL    Bilirubin, total 0.2 0.2 - 1.0 MG/DL    ALT (SGPT) 30 12 - 78 U/L    AST (SGOT) 22 15 - 37 U/L    Alk.  phosphatase 132 (H) 45 - 117 U/L    Protein, total 7.6 6.4 - 8.2 g/dL    Albumin 3.4 (L) 3.5 - 5.0 g/dL    Globulin 4.2 (H) 2.0 - 4.0 g/dL    A-G Ratio 0.8 (L) 1.1 - 2.2     MAGNESIUM    Collection Time: 10/18/21  8:46 PM   Result Value Ref Range    Magnesium 2.5 (H) 1.6 - 2.4 mg/dL   DRUG SCREEN, URINE    Collection Time: 10/18/21  8:53 PM   Result Value Ref Range    AMPHETAMINES Negative NEG      BARBITURATES Negative NEG      BENZODIAZEPINES Negative NEG      COCAINE Positive (A) NEG      METHADONE Negative NEG      OPIATES Negative NEG      PCP(PHENCYCLIDINE) Negative NEG      THC (TH-CANNABINOL) Negative NEG      Drug screen comment (NOTE)    URINALYSIS W/ REFLEX CULTURE    Collection Time: 10/18/21  8:53 PM    Specimen: Urine   Result Value Ref Range    Color YELLOW/STRAW      Appearance CLEAR CLEAR      Specific gravity 1.025 1.003 - 1.030      pH (UA) 6.0 5.0 - 8.0      Protein Negative NEG mg/dL    Glucose >1,000 (A) NEG mg/dL    Ketone Negative NEG mg/dL    Bilirubin Negative NEG      Blood Negative NEG      Urobilinogen 1.0 0.2 - 1.0 EU/dL    Nitrites Negative NEG      Leukocyte Esterase Negative NEG      WBC 0-4 0 - 4 /hpf    RBC 0-5 0 - 5 /hpf    Epithelial cells FEW FEW /lpf    Bacteria Negative NEG /hpf    UA:UC IF INDICATED CULTURE NOT INDICATED BY UA RESULT CNI      Hyaline cast 0-2 0 - 5 /lpf       Radiologic Studies -   CT HEAD WO CONT   Final Result      No acute intracranial abnormality. CT Results  (Last 48 hours)               10/19/21 0238  CT HEAD WO CONT Final result    Impression:      No acute intracranial abnormality. Narrative:  EXAM:  CT HEAD WO CONT       INDICATION: Headaches       COMPARISON: None       TECHNIQUE: Noncontrast head CT. Coronal and sagittal reformats. CT dose   reduction was achieved through use of a standardized protocol tailored for this   examination and automatic exposure control for dose modulation. FINDINGS: The ventricles and sulci are age-appropriate without hydrocephalus. There is no mass effect or midline shift. There is no intracranial hemorrhage or   extra-axial fluid collection. There is no abnormal parenchymal attenuation. The   gray-white matter differentiation is maintained. The basal cisterns are patent. The osseous structures are intact. The visualized paranasal sinuses and mastoid   air cells are clear. CXR Results  (Last 48 hours)    None          Medical Decision Making   I am the first provider for this patient. I reviewed the vital signs, available nursing notes, past medical history, past surgical history, family history and social history. Vital Signs-Reviewed the patient's vital signs. Patient Vitals for the past 12 hrs:   Temp Pulse Resp BP SpO2   10/19/21 0022 98.2 °F (36.8 °C) (!) 105 15 (!) 166/106 --   10/18/21 1954 98.2 °F (36.8 °C) (!) 103 15 (!) 179/78 100 %       Records Reviewed: Nursing Notes, Old Medical Records, Previous Radiology Studies and Previous Laboratory Studies, Pt followed by Dr. Andi Resendiz, Last ED visit 8/25/2021- Marino hip pain, last PCP visit 8/11       Provider Notes (Medical Decision Making):   DDx- Substance use disorder, Cocaine dependency, electrolyte abnormality         ED Course:   Initial assessment performed.  The patients presenting problems have been discussed, and they are in agreement with the care plan formulated and outlined with them. I have encouraged them to ask questions as they arise throughout their visit. Pt with no acute medical issues. There is no SI/HI. I have provided him with several phone numbers including local inpatient treatment programs as well as [place] to [phone] with instructions to call 1st thing in the morning. I have provided him with a copy of his results for medical clearance. Disposition:  MT home    DISCHARGE PLAN:  1. Current Discharge Medication List      No new medications     2. Follow-up Information    None       3. Return to ED if worse     Diagnosis     Clinical Impression:   1. Cocaine abuse [facility])        Attestations:    Rafat Jarvis, DO    Please note that this dictation was completed with [facility], the computer voice recognition software. Quite often unanticipated grammatical, syntax, homophones, and other interpretive errors are inadvertently transcribed by the computer software. Please disregard these errors. Please excuse any errors that have escaped final proofreading. Thank you.

## 2021-10-19 NOTE — DISCHARGE INSTRUCTIONS
Inpatient Treatment Center:    [facility] [phone]    [facility] [phone]    Make sure you are taking your daily medications

## 2021-10-19 NOTE — ED NOTES
Discharge paperwork reviewed with patient, he doesn't have any questions at this time. He is ambulatory and is driving himself home.

## 2021-11-04 ENCOUNTER — TELEPHONE (OUTPATIENT)
Dept: SLEEP MEDICINE | Age: 56
End: 2021-11-04

## 2021-11-05 ENCOUNTER — OFFICE VISIT (OUTPATIENT)
Dept: FAMILY MEDICINE CLINIC | Age: 56
End: 2021-11-05
Payer: COMMERCIAL

## 2021-11-05 VITALS
HEART RATE: 102 BPM | OXYGEN SATURATION: 99 % | WEIGHT: 282.6 LBS | RESPIRATION RATE: 20 BRPM | SYSTOLIC BLOOD PRESSURE: 160 MMHG | TEMPERATURE: 98.8 F | HEIGHT: 64 IN | DIASTOLIC BLOOD PRESSURE: 100 MMHG | BODY MASS INDEX: 48.25 KG/M2

## 2021-11-05 DIAGNOSIS — G47.33 OSA (OBSTRUCTIVE SLEEP APNEA): ICD-10-CM

## 2021-11-05 DIAGNOSIS — I10 ESSENTIAL HYPERTENSION, BENIGN: Primary | ICD-10-CM

## 2021-11-05 DIAGNOSIS — E11.9 TYPE 2 DIABETES MELLITUS WITHOUT COMPLICATION, WITHOUT LONG-TERM CURRENT USE OF INSULIN (HCC): ICD-10-CM

## 2021-11-05 PROBLEM — E11.22 TYPE 2 DIABETES MELLITUS WITH CHRONIC KIDNEY DISEASE (HCC): Status: ACTIVE | Noted: 2021-11-05

## 2021-11-05 PROCEDURE — 3051F HG A1C>EQUAL 7.0%<8.0%: CPT | Performed by: FAMILY MEDICINE

## 2021-11-05 PROCEDURE — 99213 OFFICE O/P EST LOW 20 MIN: CPT | Performed by: FAMILY MEDICINE

## 2021-11-05 RX ORDER — METFORMIN HYDROCHLORIDE 750 MG/1
750 TABLET, EXTENDED RELEASE ORAL DAILY
Qty: 30 TABLET | Refills: 11 | Status: SHIPPED | OUTPATIENT
Start: 2021-11-05

## 2021-11-05 RX ORDER — BISOPROLOL FUMARATE AND HYDROCHLOROTHIAZIDE 2.5; 6.25 MG/1; MG/1
1 TABLET ORAL DAILY
Qty: 30 TABLET | Refills: 11 | Status: SHIPPED | OUTPATIENT
Start: 2021-11-05 | End: 2021-11-29 | Stop reason: ALTCHOICE

## 2021-11-05 RX ORDER — METFORMIN HYDROCHLORIDE 750 MG/1
750 TABLET, EXTENDED RELEASE ORAL DAILY
Qty: 30 TABLET | Refills: 11 | Status: SHIPPED | OUTPATIENT
Start: 2021-11-05 | End: 2021-11-05 | Stop reason: SDUPTHER

## 2021-11-05 RX ORDER — NAPROXEN 250 MG/1
TABLET ORAL 2 TIMES DAILY WITH MEALS
COMMUNITY
End: 2022-08-16 | Stop reason: ALTCHOICE

## 2021-11-05 NOTE — PROGRESS NOTES
HISTORY OF PRESENT ILLNESS  Georgiana Jean-Baptiste III is a 61 y.o. male. F/U HBP,DM2 Substance Abuse Disorder now in inpt Rehab in [place]. Doing well,benefiting greatly from rehab  Diabetes  The history is provided by the patient. This is a chronic problem. The problem occurs daily. The problem has not changed since onset. Pertinent negatives include no chest pain. Hypertension   The history is provided by the patient. The problem has not changed since onset. Associated symptoms include peripheral edema. Pertinent negatives include no chest pain, no PND and no malaise/fatigue. Review of Systems   Constitutional: Negative for fever and malaise/fatigue. Cardiovascular: Negative for chest pain and PND. Physical Exam  Constitutional:       Appearance: Normal appearance. HENT:      Head: Normocephalic and atraumatic. Right Ear: Tympanic membrane normal.      Left Ear: Tympanic membrane normal.      Nose: Nose normal.      Mouth/Throat:      Mouth: Mucous membranes are moist.   Cardiovascular:      Pulses: Normal pulses. Heart sounds: Normal heart sounds. Pulmonary:      Effort: Pulmonary effort is normal.      Breath sounds: Normal breath sounds. Abdominal:      General: Abdomen is flat. Palpations: Abdomen is soft. Musculoskeletal:      Cervical back: Normal range of motion and neck supple. Neurological:      Mental Status: He is alert. ASSESSMENT and PLAN  Diagnoses and all orders for this visit:    1. Essential hypertension, benign  -     bisoprolol-hydroCHLOROthiazide (ZIAC) 2.5-6.25 mg per tablet; Take 1 Tablet by mouth daily. 2. WESTLEY (obstructive sleep apnea)    3. Type 2 diabetes mellitus without complication, without long-term current use of insulin (HCC)  -     metFORMIN ER (GLUCOPHAGE XR) 750 mg tablet; Take 1 Tablet by mouth daily. Follow-up and Dispositions    · Return in about 4 weeks (around 12/3/2021).

## 2021-11-05 NOTE — PROGRESS NOTES
Chief Complaint   Patient presents with    Diabetes     follow up    Hypertension     follow up     1. Have you been to the ER, urgent care clinic since your last visit? Hospitalized since your last visit? yes [facility] HTN    2. Have you seen or consulted any other health care providers outside of the [address] [place] [address] since your last visit? Include any pap smears or colon screening.  no

## 2021-11-11 ENCOUNTER — OFFICE VISIT (OUTPATIENT)
Dept: SLEEP MEDICINE | Age: 56
End: 2021-11-11
Payer: COMMERCIAL

## 2021-11-11 VITALS
RESPIRATION RATE: 20 BRPM | HEART RATE: 87 BPM | BODY MASS INDEX: 49.35 KG/M2 | SYSTOLIC BLOOD PRESSURE: 139 MMHG | OXYGEN SATURATION: 94 % | WEIGHT: 287.5 LBS | DIASTOLIC BLOOD PRESSURE: 97 MMHG | TEMPERATURE: 97.5 F

## 2021-11-11 DIAGNOSIS — G47.33 OBSTRUCTIVE SLEEP APNEA (ADULT) (PEDIATRIC): Primary | ICD-10-CM

## 2021-11-11 DIAGNOSIS — Z11.59 SPECIAL SCREENING EXAMINATION FOR VIRAL DISEASE: ICD-10-CM

## 2021-11-11 DIAGNOSIS — I10 ESSENTIAL HYPERTENSION, BENIGN: ICD-10-CM

## 2021-11-11 PROCEDURE — 99204 OFFICE O/P NEW MOD 45 MIN: CPT | Performed by: INTERNAL MEDICINE

## 2021-11-11 NOTE — PROGRESS NOTES
[address] [facility] [place]. [place], [address]  Tel.  [phone]  Fax. 100 [facility] 60  [place], [address] [facility]  Tel.  [phone]  Fax. [phone] [address]  Tel.  [phone]  Fax. [phone]       Subjective:     Conchita Claudio III is a 61 y.o. male self-referred for evaluation and management of sleep apnea. He was diagnosed with sleep apnea 5 years ago. He is using his device regularly. He complains of excessive daytime sleepiness associated with frequent wakings at night. Symptoms began 3 months ago, unchanged since that time. He usually can fall asleep in 20-30 minutes. He denies falling asleep while driving. There are no  mask comfort problems. The following problems are noted with PAP:     Drowsiness yes Problems exhaling no   Snoring no Forget to put on no   Mask Comfortable yes Can't fall asleep no   Dry Mouth no Mask falls off no   Air Leaking no Frequent awakenings yes     Conchita Claudio III does wake up frequently at night. He is not bothered by waking up too early and left unable to get back to sleep. He actually sleeps about 4 hours at night and wakes up about 3 times during the night. He does work shifts: Second Shift. Jamie indicates he does get too little sleep at night. His bedtime is 0030. He awakens at 0600. He does not take naps. . He has the following observed behaviors: Loud snoring, Pauses in breathing;  . Other remarks:    He is recently out of rehab. He has a lot of stressors with his mom being very ill and in the hospital  Parkman Sleepiness Score: 14   which reflect moderate daytime drowsiness. Allergies   Allergen Reactions    Ramipril Cough         Current Outpatient Medications:     naproxen (NAPROSYN) 250 mg tablet, Take  by mouth two (2) times daily (with meals). , Disp: , Rfl:     metFORMIN ER (GLUCOPHAGE XR) 750 mg tablet, Take 1 Tablet by mouth daily. , Disp: 30 Tablet, Rfl: 11   bisoprolol-hydroCHLOROthiazide (ZIAC) 2.5-6.25 mg per tablet, Take 1 Tablet by mouth daily. , Disp: 30 Tablet, Rfl: 11    losartan (COZAAR) 100 mg tablet, TAKE 1 TABLET BY MOUTH EVERY DAY, Disp: 90 Tablet, Rfl: 3    glimepiride (AMARYL) 4 mg tablet, Take 0.5 Tablets by mouth every morning., Disp: 90 Tablet, Rfl: 0    Accu-Chek Xochilt Plus test strp strip, USE TO TEST BLOOD DAILY (BEFORE BREAKFAST). , Disp: 100 Strip, Rfl: 1    dilTIAZem ER (CARDIZEM CD) 300 mg capsule, TAKE 1 CAPSULE BY MOUTH EVERY DAY, Disp: 90 Capsule, Rfl: 2    tadalafiL (CIALIS) 20 mg tablet, TAKE 1 TABLET BY MOUTH EVERY DAY AS NEEDED, Disp: 6 Tab, Rfl: 11    Accu-Chek Xochilt Plus Meter misc, Test blood sugar daily before breakfast., Disp: 1 Each, Rfl: 0    Blood-Glucose Meter monitoring kit, As directed,daily,fasting, Disp: 1 Kit, Rfl: 0    Klor-Con M10 10 mEq tablet, TAKE 3 TABLETS BY MOUTH TWO (2) TIMES A DAY. (Patient not taking: Reported on 11/5/2021), Disp: 180 Tablet, Rfl: 1    furosemide (LASIX) 20 mg tablet, Take 1 Tablet by mouth daily as needed (edema). (Patient not taking: Reported on 11/5/2021), Disp: 90 Tablet, Rfl: 1    zolpidem (AMBIEN) 5 mg tablet, Take 1 Tablet by mouth nightly as needed for Sleep. Max Daily Amount: 5 mg. (Patient not taking: Reported on 11/5/2021), Disp: 30 Tablet, Rfl: 0    spironolactone (ALDACTONE) 50 mg tablet, Take 1 Tablet by mouth daily. (Patient not taking: Reported on 11/5/2021), Disp: 30 Tablet, Rfl: 5    magnesium oxide (MAG-OX) 400 mg tablet, Take 1 Tablet by mouth daily. (Patient not taking: Reported on 11/5/2021), Disp: 30 Tablet, Rfl: 0    allopurinoL (ZYLOPRIM) 100 mg tablet, Take 1 Tab by mouth daily for 360 days.  (Patient not taking: Reported on 11/5/2021), Disp: 30 Tab, Rfl: 11    HYDROcodone-acetaminophen (NORCO) 5-325 mg per tablet, TAKE ONE TABLET BY MOUTH 3 TIMES PER DAY FOR 2 DAYS (Patient not taking: Reported on 7/8/2021), Disp: , Rfl:     glucose blood VI test strips (BLOOD GLUCOSE TEST) strip, by Does Not Apply route Daily (before breakfast). (Patient not taking: Reported on 11/5/2021), Disp: 100 Strip, Rfl: 2    lancets (ONE TOUCH DELICA) 33 gauge misc, by Does Not Apply route daily. Check blood sugars once daily (Patient not taking: Reported on 11/5/2021), Disp: 100 Lancet, Rfl: 3    glucose blood VI test strips (ONETOUCH VERIO) strip, by Does Not Apply route daily. Check blood sugars once daily (Patient not taking: Reported on 11/5/2021), Disp: 48 Strip, Rfl: 11     He  has a past medical history of Cancer ([facility] [address] Diabetes ([facility] [address] ED (erectile dysfunction) (6/15/2010), Encounter for long-term (current) use of other medications (4/10/2011), Essential hypertension, benign (6/15/2010), Family history of colon cancer (2/17/2012), GERD (gastroesophageal reflux disease) (6/15/2010), Headache(784.0), Hypertension, Migraine syndrome (6/15/2010), Mixed hyperlipidemia (4/10/2011), and WESTLEY (obstructive sleep apnea) (6/15/2010). He also has no past medical history of Arrhythmia, Arthritis, CAD (coronary artery disease), Calculus of kidney, Chronic kidney disease, Chronic obstructive pulmonary disease ([facility] [address] or Thyroid disease. He  has a past surgical history that includes colonoscopy,remv lesn,forcep/cautery (6/7/2016); colonoscopy (N/A, 6/7/2016); hx colonoscopy; hx urological; and hx orthopaedic. He family history includes Cancer in his father; Diabetes in his father and mother; Hypertension in his mother; Stroke in his mother. He  reports that he has never smoked. He has never used smokeless tobacco. He reports current alcohol use of about 1.7 standard drinks of alcohol per week. He reports previous drug use. Drug: Cocaine. Review of Systems:  Constitutional:  No significant weight loss or weight gain. Eyes:  No blurred vision.   CVS:  No significant chest pain  Pulm:  No significant shortness of breath  GI:  No significant nausea or vomiting  :  + significant nocturia  Musculoskeletal:  No significant joint pain at night  Skin:  No significant rashes  Neuro:  No significant dizziness   Psych:  +anxiety    Sleep Review of Systems: notable for no difficulty falling asleep; +frequent awakenings at night; no dreaming noted; no nightmares ; no early morning headaches ; no memory problems; no concentration issues    Objective:     Visit Vitals  BP (!) 139/97 (BP 1 Location: Right arm, BP Patient Position: Sitting, BP Cuff Size: Large adult)   Pulse 87   Temp 97.5 °F (36.4 °C) (Temporal)   Resp 20   Wt 287 lb 8 oz (130.4 kg)   SpO2 94%   BMI 49.35 kg/m²         General:   Not in acute distress   Eyes:  Anicteric sclerae, no obvious strabismus   Nose:  No obvious nasal septum deviation    Neck:    ; midline trachea   Chest/Lungs:  Equal lung expansion, clear on auscultation    CVS:  Normal rate, regular rhythm; no JVD   Skin:  Warm to touch; no obvious rashes   Neuro:  No focal deficits ; no obvious tremor    Psych:  Normal affect,  normal countenance;          Assessment:       ICD-10-CM ICD-9-CM    1. Obstructive sleep apnea (adult) (pediatric)  G47.33 327.23 SLEEP LAB (PAP TITRATION)   2. Special screening examination for viral disease  Z11.59 V73.99 NOVEL CORONAVIRUS (COVID-19)   3. Essential hypertension, benign  I10 401.1        Plan:       he is compliant with PAP therapy and PAP continues to benefit patient and remains necessary for control of his sleep apnea. PAP titration to optimize pressure settings. I will order replacement device for him    I reviewed the Elie Arceo recall. We discussed the problem of possible sound abatement foam breakdown. he does not use a So-clean device or other commercial PAP . he understands the use of those devices may increase likelihood of the foam breakdown. he was advised of the 's recommendation to stop use of these PAP devices until the problem is rectified.   We discussed the pros and cons of withholding PAP therapy. he was informed that [employer] is working on a solution and updating their website daily. he was encouraged to check the site. he was advised to get on the recall register. Should he decide to discontinue PAP device, he should sleep with head of bed elevated or avoid sleeping on his back to help minimize respiratory events. 2. Hypertension - he continues on his current regimen. I have reviewed the relationship between hypertension as it relates to sleep-disordered breathing.          Electronically signed by    Dena Hwang MD  Diplomate in Sleep Medicine  [facility]    11/11/2021

## 2021-11-11 NOTE — PATIENT INSTRUCTIONS
[address] [facility] [address] [place]. [place], [address]  Tel.  [phone]  Fax. [address] [facility] [address]  [place], [address] [facility] [address]  Tel.  [phone]  Fax. [phone] [address]  Tel.  [phone]  Fax. [phone]     PROPER SLEEP HYGIENE    What to avoid  · Do not have drinks with caffeine, such as coffee or black tea, for 8 hours before bed. · Do not smoke or use other types of tobacco near bedtime. Nicotine is a stimulant and can keep you awake. · Avoid drinking alcohol late in the evening, because it can cause you to wake in the middle of the night. · Do not eat a big meal close to bedtime. If you are hungry, eat a light snack. · Do not drink a lot of water close to bedtime, because the need to urinate may wake you up during the night. · Do not read or watch TV in bed. Use the bed only for sleeping and sexual activity. What to try  · Go to bed at the same time every night, and wake up at the same time every morning. Do not take naps during the day. · Keep your bedroom quiet, dark, and cool. · Get regular exercise, but not within 3 to 4 hours of your bedtime. .  · Sleep on a comfortable pillow and mattress. · If watching the clock makes you anxious, turn it facing away from you so you cannot see the time. · If you worry when you lie down, start a worry book. Well before bedtime, write down your worries, and then set the book and your concerns aside. · Try meditation or other relaxation techniques before you go to bed. · If you cannot fall asleep, get up and go to another room until you feel sleepy. Do something relaxing. Repeat your bedtime routine before you go to bed again. · Make your house quiet and calm about an hour before bedtime. Turn down the lights, turn off the TV, log off the computer, and turn down the volume on music. This can help you relax after a busy day.     Drowsy Driving  The [address] [place] [address] Road Traffic Safety Administration cites drowsiness as a causing factor in more than 288,652 police reported crashes annually, resulting in 76,000 injuries and 1,500 deaths. Other surveys suggest 55% of people polled have driven while drowsy in the past year, 23% had fallen asleep but not crashed, 3% crashed, and 2% had and accident due to drowsy driving. Who is at risk? Young Drivers: One study of drowsy driving accidents states that 55% of the drivers were under 25 years. Of those, 75% were male. Shift Workers and Travelers: People who work overnight or travel across time zones frequently are at higher risk of experiencing Circadian Rhythm Disorders. They are trying to work and function when their body is programed to sleep. Sleep Deprived: Lack of sleep has a serious impact on your ability to pay attention or focus on a task. Consistently getting less than the average of 8 hours your body needs creates partial or cumulative sleep deprivation. Untreated Sleep Disorders: Sleep Apnea, Narcolepsy, R.L.S., and other sleep disorders (untreated) prevent a person from getting enough restful sleep. This leads to excessive daytime sleepiness and increases the risk for drowsy driving accidents by up to 7 times. Medications / Alcohol: Even over the counter medications can cause drowsiness. Medications that impair a drivers attention should have a warning label. Alcohol naturally makes you sleepy and on its own can cause accidents. Combined with excessive drowsiness its effects are amplified. Signs of Drowsy Driving:   * You don't remember driving the last few miles   * You may drift out of your neftaly   * You are unable to focus and your thoughts wander   * You may yawn more often than normal   * You have difficulty keeping your eyes open / nodding off   * Missing traffic signs, speeding, or tailgating  Prevention-   Good sleep hygiene, lifestyle and behavioral choices have the most impact on drowsy driving.  There is no substitute for sleep and the average person requires 8 hours nightly. If you find yourself driving drowsy, stop and sleep. Consider the sleep hygiene tips provided during your visit as well. Medication Refill Policy: Refills for all medications require 1 week advance notice. Please have your pharmacy fax a refill request. We are unable to fax, or call in \"controled substance\" medications and you will need to pick these prescriptions up from our office. WireImage Activation    Thank you for requesting access to [employer]Image. Please follow the instructions below to securely access and download your online medical record. WireImage allows you to send messages to your doctor, view your test results, renew your prescriptions, schedule appointments, and more. How Do I Sign Up? 1. In your internet browser, go to [email or web address]. 2. Click on the First Time User? Click Here link in the Sign In box. You will see the New Member Sign Up page. 3. Enter your WireImage Access Code exactly as it appears below. You will not need to use this code after youve completed the sign-up process. If you do not sign up before the expiration date, you must request a new code. WireImage Access Code: [email or web address]  Expires: 12/3/2021  1:24 AM (This is the date your WireImage access code will )    4. Enter the last four digits of your Social Security Number (xxxx) and Date of Birth (mm/dd/yyyy) as indicated and click Submit. You will be taken to the next sign-up page. 5. Create a WireImage ID. This will be your WireImage login ID and cannot be changed, so think of one that is secure and easy to remember. 6. Create a WireImage password. You can change your password at any time. 7. Enter your Password Reset Question and Answer. This can be used at a later time if you forget your password. 8. Enter your e-mail address. You will receive e-mail notification when new information is available in [address] 9. Click Sign Up.  You can now view and download portions of your medical record. 10. Click the Download Summary menu link to download a portable copy of your medical information. Additional Information    If you have questions, please call [phone]. Remember, Outski is NOT to be used for urgent needs. For medical emergencies, dial 911.

## 2021-11-12 RX ORDER — TADALAFIL 20 MG/1
TABLET ORAL
Qty: 6 TABLET | Refills: 9 | Status: SHIPPED | OUTPATIENT
Start: 2021-11-12

## 2021-11-26 ENCOUNTER — TELEPHONE (OUTPATIENT)
Dept: SLEEP MEDICINE | Age: 56
End: 2021-11-26

## 2021-11-29 ENCOUNTER — OFFICE VISIT (OUTPATIENT)
Dept: FAMILY MEDICINE CLINIC | Age: 56
End: 2021-11-29
Payer: COMMERCIAL

## 2021-11-29 VITALS
HEIGHT: 64 IN | HEART RATE: 83 BPM | RESPIRATION RATE: 20 BRPM | TEMPERATURE: 99.1 F | SYSTOLIC BLOOD PRESSURE: 160 MMHG | BODY MASS INDEX: 49.95 KG/M2 | DIASTOLIC BLOOD PRESSURE: 102 MMHG | OXYGEN SATURATION: 95 % | WEIGHT: 292.6 LBS

## 2021-11-29 DIAGNOSIS — I10 ESSENTIAL HYPERTENSION, BENIGN: Primary | ICD-10-CM

## 2021-11-29 DIAGNOSIS — E78.2 MIXED HYPERLIPIDEMIA: ICD-10-CM

## 2021-11-29 DIAGNOSIS — E11.9 TYPE 2 DIABETES MELLITUS WITHOUT COMPLICATION, WITHOUT LONG-TERM CURRENT USE OF INSULIN (HCC): ICD-10-CM

## 2021-11-29 DIAGNOSIS — G47.9 SLEEP DISORDER: ICD-10-CM

## 2021-11-29 PROCEDURE — 3051F HG A1C>EQUAL 7.0%<8.0%: CPT | Performed by: FAMILY MEDICINE

## 2021-11-29 PROCEDURE — 99213 OFFICE O/P EST LOW 20 MIN: CPT | Performed by: FAMILY MEDICINE

## 2021-11-29 RX ORDER — BISOPROLOL FUMARATE AND HYDROCHLOROTHIAZIDE 10; 6.25 MG/1; MG/1
1 TABLET ORAL DAILY
Qty: 30 TABLET | Refills: 11 | Status: SHIPPED | OUTPATIENT
Start: 2021-11-29

## 2021-11-29 NOTE — PROGRESS NOTES
Chief Complaint   Patient presents with    Diabetes     follow up    Hypertension     follow up    Cholesterol Problem     follow up     1. Have you been to the ER, urgent care clinic since your last visit? Hospitalized since your last visit?no    2. Have you seen or consulted any other health care providers outside of the [address] [place] [address] since your last visit? Include any pap smears or colon screening.  no

## 2021-11-29 NOTE — PROGRESS NOTES
HISTORY OF PRESENT ILLNESS  Kareen Preston III is a 61 y.o. male. F/U HBP,DM2, Substance Abuse Disorder now ha completed Rehab in [place]. Doing well,has returned to work  Diabetes  The history is provided by the patient. This is a chronic problem. The problem occurs daily. The problem has not changed since onset. Pertinent negatives include no chest pain. Hypertension   The history is provided by the patient. The problem has not changed since onset. Associated symptoms include peripheral edema. Pertinent negatives include no chest pain, no palpitations, no PND and no malaise/fatigue. Cholesterol Problem  This is a chronic problem. The problem occurs daily. The problem has not changed since onset. Pertinent negatives include no chest pain. Review of Systems   Constitutional: Negative for fever and malaise/fatigue. Cardiovascular: Negative for chest pain, palpitations and PND. Musculoskeletal: Negative for myalgias. Psychiatric/Behavioral: Negative for depression. Physical Exam  Constitutional:       Appearance: Normal appearance. He is obese. HENT:      Head: Normocephalic and atraumatic. Right Ear: Tympanic membrane normal.      Left Ear: Tympanic membrane normal.      Nose: Nose normal.      Mouth/Throat:      Mouth: Mucous membranes are moist.   Cardiovascular:      Rate and Rhythm: Normal rate and regular rhythm. Pulses: Normal pulses. Heart sounds: Normal heart sounds. Pulmonary:      Effort: Pulmonary effort is normal.      Breath sounds: Normal breath sounds. Abdominal:      General: Abdomen is flat. Palpations: Abdomen is soft. Musculoskeletal:      Cervical back: Normal range of motion and neck supple. Skin:     General: Skin is warm and dry. Neurological:      Mental Status: He is alert. ASSESSMENT and PLAN  Diagnoses and all orders for this visit:    1.  Essential hypertension, benign,uncontrolled ,will increase ziac  -     METABOLIC PANEL, COMPREHENSIVE; Future    2. Type 2 diabetes mellitus without complication, without long-term current use of insulin (HCC)  -     AMB POC HEMOGLOBIN A1C    3. Sleep disorder    4. Mixed hyperlipidemia  -     CHOLESTEROL, TOTAL; Future    Other orders  -     bisoprolol-hydroCHLOROthiazide (ZIAC) 10-6.25 mg per tablet; Take 1 Tablet by mouth daily. Follow-up and Dispositions    · Return in about 4 weeks (around 12/27/2021).

## 2021-11-29 NOTE — TELEPHONE ENCOUNTER
LVM to inform the patient that his paperwork is a reminder of his sleep study and COVID test.  Nothing must be completed.

## 2021-11-30 LAB
ALBUMIN SERPL-MCNC: 3.8 G/DL (ref 3.5–5)
ALBUMIN/GLOB SERPL: 1.1 {RATIO} (ref 1.1–2.2)
ALP SERPL-CCNC: 120 U/L (ref 45–117)
ALT SERPL-CCNC: 26 U/L (ref 12–78)
ANION GAP SERPL CALC-SCNC: 7 MMOL/L (ref 5–15)
AST SERPL-CCNC: 19 U/L (ref 15–37)
BILIRUB SERPL-MCNC: 0.4 MG/DL (ref 0.2–1)
BUN SERPL-MCNC: 10 MG/DL (ref 6–20)
BUN/CREAT SERPL: 11 (ref 12–20)
CALCIUM SERPL-MCNC: 9.7 MG/DL (ref 8.5–10.1)
CHLORIDE SERPL-SCNC: 104 MMOL/L (ref 97–108)
CHOLEST SERPL-MCNC: 199 MG/DL
CO2 SERPL-SCNC: 29 MMOL/L (ref 21–32)
COMMENT, HOLDF: NORMAL
CREAT SERPL-MCNC: 0.93 MG/DL (ref 0.7–1.3)
GLOBULIN SER CALC-MCNC: 3.6 G/DL (ref 2–4)
GLUCOSE SERPL-MCNC: 123 MG/DL (ref 65–100)
POTASSIUM SERPL-SCNC: 3.8 MMOL/L (ref 3.5–5.1)
PROT SERPL-MCNC: 7.4 G/DL (ref 6.4–8.2)
SAMPLES BEING HELD,HOLD: NORMAL
SODIUM SERPL-SCNC: 140 MMOL/L (ref 136–145)

## 2021-12-06 ENCOUNTER — OFFICE VISIT (OUTPATIENT)
Dept: SLEEP MEDICINE | Age: 56
End: 2021-12-06

## 2021-12-06 DIAGNOSIS — G47.33 OSA (OBSTRUCTIVE SLEEP APNEA): Primary | ICD-10-CM

## 2021-12-08 LAB
SARS-COV-2, NAA 2 DAY TAT: NORMAL
SARS-COV-2, NAA: NOT DETECTED

## 2021-12-10 ENCOUNTER — HOSPITAL ENCOUNTER (OUTPATIENT)
Dept: SLEEP MEDICINE | Age: 56
Discharge: HOME | End: 2021-12-10
Payer: COMMERCIAL

## 2021-12-10 DIAGNOSIS — G47.33 OBSTRUCTIVE SLEEP APNEA (ADULT) (PEDIATRIC): ICD-10-CM

## 2021-12-10 PROCEDURE — 95811 POLYSOM 6/>YRS CPAP 4/> PARM: CPT | Performed by: INTERNAL MEDICINE

## 2021-12-11 ENCOUNTER — DOCUMENTATION ONLY (OUTPATIENT)
Dept: SLEEP MEDICINE | Age: 56
End: 2021-12-11

## 2021-12-11 VITALS
DIASTOLIC BLOOD PRESSURE: 82 MMHG | HEIGHT: 64 IN | BODY MASS INDEX: 49.85 KG/M2 | OXYGEN SATURATION: 97 % | SYSTOLIC BLOOD PRESSURE: 142 MMHG | WEIGHT: 292 LBS | HEART RATE: 78 BPM | TEMPERATURE: 97.6 F

## 2021-12-11 NOTE — PROGRESS NOTES
[address] [place]. [place], [address]  Tel.  [phone]  Fax. 100 [facility] [address]  [place], [address] [facility]  Tel.  [phone]  Fax. [phone] [address] [facility] [address]  Tel.  [phone]  Fax. [phone]     Sleep Study Technical Notes        PRE-Test:  Carlos Davis III (: 1965) arrived in the lobby. Patient was greeted, temperature checked (97.6) and screening questions asked. The patient was taken directly to his room. BP (142/82) and SaO2 (97) were taken. Weight per patient (292). Procedure explained to the patient and questions were answered. The patient expressed understanding of the procedure. Electrodes were applied without incident. The patient was placed in bed and the study was started. Acquisition Notes:   Lights off: 10:25pm    Respiratory events: hypops   ECG:  NSR   Snoring:  mild    PAP titration: CPAP 13   Desensitization Mask(s) Used: Respironics Nuance Pro pillows size medium   Other comments:   o Patient had TV on  after lights out   o Patient to bathroom 1 time    POST Test:   Patient was awakened. Electrodes were removed. The patient was discharged after answering the Post Questionnaire. Patient stated that he was alert and ok to drive.  Equipment and room cleaned per infection control policy.

## 2021-12-19 DIAGNOSIS — E11.9 TYPE 2 DIABETES MELLITUS WITHOUT COMPLICATION, WITHOUT LONG-TERM CURRENT USE OF INSULIN (HCC): ICD-10-CM

## 2021-12-19 RX ORDER — GLIMEPIRIDE 4 MG/1
TABLET ORAL
Qty: 90 TABLET | Refills: 0 | Status: SHIPPED | OUTPATIENT
Start: 2021-12-19

## 2021-12-22 ENCOUNTER — TELEPHONE (OUTPATIENT)
Dept: SLEEP MEDICINE | Age: 56
End: 2021-12-22

## 2021-12-22 DIAGNOSIS — G47.33 OBSTRUCTIVE SLEEP APNEA (ADULT) (PEDIATRIC): Primary | ICD-10-CM

## 2021-12-22 NOTE — TELEPHONE ENCOUNTER
Results of sleep study in [employer] tech to convey results to patient    Initial Apnea/Hypopnea index was 90(2016). PAP titration study was ordered to optimize settings prior to ordering replacement device for him. .   Most of the respiratory events were resolved on CPAP 13 cmH2O. I am ordering a replacement device for him. Patient should call the office the day he gets set up with new PAP device so we can schedule him for an adherence/compliance visit within 31-90 days of obtaining a new device.

## 2021-12-27 ENCOUNTER — DOCUMENTATION ONLY (OUTPATIENT)
Dept: SLEEP MEDICINE | Age: 56
End: 2021-12-27

## 2022-01-06 DIAGNOSIS — R60.0 PEDAL EDEMA: ICD-10-CM

## 2022-01-06 DIAGNOSIS — E79.0 HYPERURICEMIA: ICD-10-CM

## 2022-01-06 RX ORDER — SPIRONOLACTONE 50 MG/1
TABLET, FILM COATED ORAL
Qty: 90 TABLET | Refills: 1 | Status: SHIPPED | OUTPATIENT
Start: 2022-01-06

## 2022-01-06 RX ORDER — ALLOPURINOL 100 MG/1
TABLET ORAL
Qty: 90 TABLET | Refills: 3 | Status: SHIPPED | OUTPATIENT
Start: 2022-01-06

## 2022-02-23 ENCOUNTER — NURSE TRIAGE (OUTPATIENT)
Dept: OTHER | Facility: CLINIC | Age: 57
End: 2022-02-23

## 2022-02-23 ENCOUNTER — OFFICE VISIT (OUTPATIENT)
Dept: FAMILY MEDICINE CLINIC | Age: 57
End: 2022-02-23
Payer: COMMERCIAL

## 2022-02-23 VITALS
OXYGEN SATURATION: 96 % | TEMPERATURE: 97.5 F | BODY MASS INDEX: 44.18 KG/M2 | DIASTOLIC BLOOD PRESSURE: 98 MMHG | RESPIRATION RATE: 20 BRPM | WEIGHT: 258.8 LBS | HEART RATE: 99 BPM | HEIGHT: 64 IN | SYSTOLIC BLOOD PRESSURE: 152 MMHG

## 2022-02-23 DIAGNOSIS — G47.9 SLEEP DISORDER: ICD-10-CM

## 2022-02-23 DIAGNOSIS — I10 ESSENTIAL HYPERTENSION, BENIGN: Primary | ICD-10-CM

## 2022-02-23 DIAGNOSIS — E11.9 TYPE 2 DIABETES MELLITUS WITHOUT COMPLICATION, WITHOUT LONG-TERM CURRENT USE OF INSULIN (HCC): ICD-10-CM

## 2022-02-23 PROCEDURE — 99213 OFFICE O/P EST LOW 20 MIN: CPT | Performed by: FAMILY MEDICINE

## 2022-02-23 NOTE — PROGRESS NOTES
HISTORY OF PRESENT ILLNESS  Chris Bhatt III is a 61 y.o. male. feeling very stressed by the care of his ill mother who is soon to be discharged from [facility]. He has forgotten to take BP meds for the last few days. Headache  The history is provided by the patient. This is a new problem. The current episode started more than 2 days ago. The problem occurs daily. The problem has not changed since onset. Associated symptoms include headaches. Pertinent negatives include no chest pain and no shortness of breath. Hypertension   The history is provided by the patient. The problem has been rapidly worsening. Associated symptoms include anxiety, malaise/fatigue and headaches. Pertinent negatives include no chest pain, no orthopnea, no palpitations and no shortness of breath. Anxiety  The history is provided by the patient. This is a chronic problem. The problem occurs daily. The problem has been rapidly worsening. Associated symptoms include headaches. Pertinent negatives include no chest pain and no shortness of breath. Review of Systems   Constitutional: Positive for malaise/fatigue. Respiratory: Negative for shortness of breath. Cardiovascular: Negative for chest pain, palpitations and orthopnea. Gastrointestinal: Negative for heartburn. Musculoskeletal: Positive for joint pain. Neurological: Positive for headaches. Psychiatric/Behavioral: Negative for depression. The patient is nervous/anxious and has insomnia. Physical Exam  Constitutional:       Appearance: Normal appearance. He is obese. HENT:      Head: Normocephalic and atraumatic. Right Ear: Tympanic membrane normal.      Left Ear: Tympanic membrane normal.   Cardiovascular:      Rate and Rhythm: Normal rate and regular rhythm. Pulses: Normal pulses. Heart sounds: Normal heart sounds. Pulmonary:      Effort: Pulmonary effort is normal.      Breath sounds: Normal breath sounds.    Abdominal:      General: Abdomen is flat.      Palpations: Abdomen is soft. Musculoskeletal:      Cervical back: Normal range of motion and neck supple. Skin:     General: Skin is warm and dry. Neurological:      Mental Status: He is alert and oriented to person, place, and time. ASSESSMENT and PLAN  Diagnoses and all orders for this visit:    1. Essential hypertension, benign due to medication noncompliance. Strongly urged to take meds as directed    2. Type 2 diabetes mellitus without complication, without long-term current use of insulin ([facility] [address] [address]    3. Sleep disorder    Medication compliance strongly encouraged,rtc 2 days  Follow-up and Dispositions    · Return in about 2 days (around 2/25/2022).

## 2022-02-23 NOTE — TELEPHONE ENCOUNTER
Received call from ANGELA at [facility] with Red Flag Complaint. Current Symptoms: Pt reports high blood pressure. He last took his BP yesterday. He is not 100% sure what the reading was, but he thinks it was around 200/100. He is not able to recheck his blood pressure right now. He does have a h/o high blood pressure. He does take medication. He did miss a dose 2 days ago. He does have a headache currently. Onset: Yesterday    Associated Symptoms: None    Pain Severity: Headache, 6 out of 10 currently, comes and goes    Temperature: Denies fever    What has been tried: Tylenol for headache    Pregnant: NA    Recommended disposition: See a provider today. Pt is not able to come in today. He would like to schedule an appointment for tomorrow. Unable to reach [facility]. Care advice provided, patient verbalizes understanding; denies any other questions or concerns; instructed to call back for any new or worsening symptoms. Patient/caller agrees to follow-up with PCP     Attention Provider: Thank you for allowing me to participate in the care of your patient. The patient was connected to triage in response to information provided to the [facility]. Please do not respond through this encounter as the response is not directed to a shared pool.     Reason for Disposition   Systolic BP >= 238 OR Diastolic >= 246 and having NO cardiac or neurologic symptoms    Protocols used: BLOOD PRESSURE - HIGH-ADULT-OH

## 2022-02-23 NOTE — LETTER
NOTIFICATION OF RETURN TO WORK / SCHOOL    2/23/2022 3:09 PM    Mr. Scot Gamez  [address] [facility] [address]  . To Whom It May Concern:    Faheem Garcia III was under the care of [facility] from 2/22/22. He will be able to return to work/school on 3/1/22 with no restrictions. If there are questions or concerns please have the patient contact our office.     Sincerely,      Renee Posadas MD

## 2022-03-05 DIAGNOSIS — M25.562 CHRONIC PAIN OF LEFT KNEE: ICD-10-CM

## 2022-03-05 DIAGNOSIS — G89.29 CHRONIC PAIN OF LEFT KNEE: ICD-10-CM

## 2022-03-06 RX ORDER — NAPROXEN 500 MG/1
TABLET ORAL
Qty: 180 TABLET | Refills: 0 | Status: SHIPPED | OUTPATIENT
Start: 2022-03-06 | End: 2022-08-16 | Stop reason: ALTCHOICE

## 2022-03-11 ENCOUNTER — OFFICE VISIT (OUTPATIENT)
Dept: FAMILY MEDICINE CLINIC | Age: 57
End: 2022-03-11
Payer: COMMERCIAL

## 2022-03-11 ENCOUNTER — NURSE TRIAGE (OUTPATIENT)
Dept: OTHER | Facility: CLINIC | Age: 57
End: 2022-03-11

## 2022-03-11 VITALS
HEIGHT: 64 IN | BODY MASS INDEX: 43.81 KG/M2 | DIASTOLIC BLOOD PRESSURE: 94 MMHG | SYSTOLIC BLOOD PRESSURE: 162 MMHG | OXYGEN SATURATION: 97 % | HEART RATE: 102 BPM | RESPIRATION RATE: 18 BRPM | TEMPERATURE: 98.7 F | WEIGHT: 256.6 LBS

## 2022-03-11 DIAGNOSIS — M17.11 PRIMARY OSTEOARTHRITIS OF RIGHT KNEE: Primary | ICD-10-CM

## 2022-03-11 DIAGNOSIS — E66.01 OBESITY, CLASS III, BMI 40-49.9 (MORBID OBESITY) (HCC): ICD-10-CM

## 2022-03-11 DIAGNOSIS — I10 ESSENTIAL HYPERTENSION, BENIGN: ICD-10-CM

## 2022-03-11 PROCEDURE — 20610 DRAIN/INJ JOINT/BURSA W/O US: CPT | Performed by: FAMILY MEDICINE

## 2022-03-11 PROCEDURE — 99214 OFFICE O/P EST MOD 30 MIN: CPT | Performed by: FAMILY MEDICINE

## 2022-03-11 RX ORDER — VALSARTAN 160 MG/1
160 TABLET ORAL DAILY
Qty: 30 TABLET | Refills: 5 | Status: SHIPPED | OUTPATIENT
Start: 2022-03-11

## 2022-03-11 NOTE — LETTER
NOTIFICATION OF RETURN TO WORK / SCHOOL    3/11/2022 4:24 PM    Mr. Dino Robles  [address] [facility] [address]  . To Whom It May Concern:    Jesus Alberto Amanda III was under the care of [facility] from 3/10/22. He will be able to return to work/school on 3/14/22 with no restrictions. If there are questions or concerns please have the patient contact our office.     Sincerely,      Virgil Brasher MD

## 2022-03-11 NOTE — PROGRESS NOTES
Chief Complaint   Patient presents with    Blood Pressure Check     F/U on elevated BP. 1. \"Have you been to the ER, urgent care clinic since your last visit? Hospitalized since your last visit? \" No    2. \"Have you seen or consulted any other health care providers outside of the [address] [place] [address] since your last visit? \" No     3. For patients aged 39-70: Has the patient had a colonoscopy / FIT/ Cologuard? Yes - Care Gap present. Most recent result on file      If the patient is female:    4. For patients aged 41-77: Has the patient had a mammogram within the past 2 years? NA - based on age or sex      11. For patients aged 21-65: Has the patient had a pap smear?  NA - based on age or sex    Health Maintenance Due   Topic Date Due    COVID-19 Vaccine (1) Never done    Pneumococcal 0-64 years (1 of 2 - PPSV23) Never done    Shingrix Vaccine Age 50> (1 of 2) Never done    Foot Exam Q1  08/13/2020    MICROALBUMIN Q1  08/13/2020    Flu Vaccine (1) 09/01/2021

## 2022-03-11 NOTE — TELEPHONE ENCOUNTER
Received call from Rebeca Kimble at [facility] with Red Flag Complaint. Subjective: Caller states \"blood pressure is high and my knee is swollen\"     Current Symptoms: BP was 212/130 five minutes ago on home machine. Hasn't taken his BP meds in 3 days. Says his pharmacy is out of losartan and so are all the local [facility] stores and they were unable to get alternative from provider. Denies headache, chest pain or shortness of breath or blurry vision. Onset: only checked it today    Associated Symptoms: NA    Pain Severity: 8/10; pain; severe    Temperature: no fever by unknown method    What has been tried: nothing    LMP: NA Pregnant: NA    Recommended disposition: Go to Office Now    Care advice provided, patient verbalizes understanding; denies any other questions or concerns; instructed to call back for any new or worsening symptoms. Patient/Caller agrees with recommended disposition; writer provided warm transfer to [facility] at [facility] for appointment scheduling    Attention Provider: Thank you for allowing me to participate in the care of your patient. The patient was connected to triage in response to information provided to the [facility]. Please do not respond through this encounter as the response is not directed to a shared pool.         Reason for Disposition   Systolic BP >= 139 OR Diastolic >= 171 and having NO cardiac or neurologic symptoms    Protocols used: BLOOD PRESSURE - HIGH-ADULT-OH

## 2022-03-12 RX ORDER — BUPIVACAINE HYDROCHLORIDE 2.5 MG/ML
1 INJECTION, SOLUTION EPIDURAL; INFILTRATION; INTRACAUDAL ONCE
Qty: 1 ML | Refills: 0
Start: 2022-03-12 | End: 2022-03-12

## 2022-03-12 RX ORDER — TRIAMCINOLONE ACETONIDE 40 MG/ML
40 INJECTION, SUSPENSION INTRA-ARTICULAR; INTRAMUSCULAR ONCE
Qty: 1 ML | Refills: 0
Start: 2022-03-12 | End: 2022-03-12

## 2022-03-12 NOTE — PROGRESS NOTES
HISTORY OF PRESENT ILLNESS  Beka Tenorio III is a 61 y.o. male. c/o worsening rt knee pain and swelling. F/U HBP losartan no longer available. Feeling ok,stressed  Blood Pressure Check  The history is provided by the patient. This is a chronic problem. The problem occurs daily. The problem has been gradually worsening. Pertinent negatives include no chest pain. Knee Pain   The history is provided by the patient. This is a chronic problem. The problem occurs daily. The problem has been gradually worsening. The pain is present in the right knee. The pain is at a severity of 5/10. The pain is moderate. Review of Systems   Constitutional: Negative for chills, fever and malaise/fatigue. Cardiovascular: Negative for chest pain and palpitations. Genitourinary: Negative for frequency. Musculoskeletal: Positive for joint pain. Physical Exam  Constitutional:       Appearance: He is obese. HENT:      Head: Normocephalic and atraumatic. Cardiovascular:      Rate and Rhythm: Normal rate and regular rhythm. Heart sounds: Normal heart sounds. Pulmonary:      Breath sounds: Normal breath sounds. Musculoskeletal:      Right knee: Swelling, deformity, bony tenderness and crepitus present. Decreased range of motion. Skin:     General: Skin is warm and dry. Neurological:      Mental Status: He is alert. ASSESSMENT and PLAN  Diagnoses and all orders for this visit:    1. Primary osteoarthritis of right knee    2. Essential hypertension, benign    3. Obesity, Class III, BMI 40-49.9 (morbid obesity) ([facility] [address]    Other orders  -     valsartan (DIOVAN) 160 mg tablet; Take 1 Tablet by mouth daily. Follow-up and Dispositions    · Return in about 4 weeks (around 4/8/2022). Indications:   Symptom relief from  Right Knee osteoarthritis    Procedure:  After consent was obtained,and procedure risks and benefits reviewed,using sterile technique the Rt Knee joint   was prepped using Betadine.    The joint was entered usinga 23 ga needle and 40 mg of Kenalog   and 1 ml Marcaine were injected without difficulty ,and the needle was withdrawn. The procedure was well tolerated,with negligile discomfort postprocedure. The patient was asked to continue to rest the joint fora few days before resuming normal activities. They were advised that there may be increased pain for the next 1-2 days,and to watch for fever,redness,or increased swelling or pain. They were advised to call if such symptoms develop. Joint Injection instruction sheet was given to the patient and reviewed. The patient departed in good condition

## 2022-03-19 PROBLEM — R73.02 IGT (IMPAIRED GLUCOSE TOLERANCE): Status: ACTIVE | Noted: 2017-06-14

## 2022-03-19 PROBLEM — R60.0 BILATERAL LOWER EXTREMITY EDEMA: Status: ACTIVE | Noted: 2021-08-09

## 2022-03-19 PROBLEM — E87.6 HYPOKALEMIA: Status: ACTIVE | Noted: 2021-08-09

## 2022-03-19 PROBLEM — E11.22 TYPE 2 DIABETES MELLITUS WITH CHRONIC KIDNEY DISEASE (HCC): Status: ACTIVE | Noted: 2021-11-05

## 2022-03-19 PROBLEM — R35.1 NOCTURIA: Status: ACTIVE | Noted: 2017-06-14

## 2022-03-20 PROBLEM — E66.01 OBESITY, MORBID (HCC): Status: ACTIVE | Noted: 2018-01-22

## 2022-03-21 ENCOUNTER — TELEPHONE (OUTPATIENT)
Dept: FAMILY MEDICINE CLINIC | Age: 57
End: 2022-03-21

## 2022-03-21 NOTE — TELEPHONE ENCOUNTER
Called patient, he was told that it's 7-14 working days for [employer] forms we will call him when they are completed.

## 2022-03-21 NOTE — TELEPHONE ENCOUNTER
----- Message from Stefania Agent sent at 3/21/2022  2:26 PM EDT -----  Subject: Message to Provider    QUESTIONS  Information for Provider? Pt called to check and see if his FMLA paperwork   is ready to be picked up. Please advise.   ---------------------------------------------------------------------------  --------------  CALL BACK INFO  What is the best way for the office to contact you? OK to leave message on   voicemail  Preferred Call Back Phone Number? [phone]  ---------------------------------------------------------------------------  --------------  SCRIPT ANSWERS  Relationship to Patient?  Self

## 2022-03-25 ENCOUNTER — TELEPHONE (OUTPATIENT)
Dept: FAMILY MEDICINE CLINIC | Age: 57
End: 2022-03-25

## 2022-03-25 NOTE — TELEPHONE ENCOUNTER
Spoke to pt about his FMLA forms to let him know that Dr. Tracy Kumar was out of the office and his FMLA forms were on Dr. Americo Coburn desk with the date waiting to be filled out by 3/31 pr pt. Pt stated he was upset because he was put in the street for a week by his job because of the forms were not being completed in time.

## 2022-03-25 NOTE — TELEPHONE ENCOUNTER
----- Message from Sumit West sent at 3/25/2022 12:39 PM EDT -----  Subject: Message to Provider    QUESTIONS  Information for Provider? Patient states the FMLA forms are due by 3/31   and needs them with enough time to submit. Please call Pt to discuss   further  ---------------------------------------------------------------------------  --------------  CALL BACK INFO  What is the best way for the office to contact you? OK to leave message on   voicemail  Preferred Call Back Phone Number? [phone]  ---------------------------------------------------------------------------  --------------  SCRIPT ANSWERS  Relationship to Patient?  Self

## 2022-03-25 NOTE — TELEPHONE ENCOUNTER
----- Message from Daniel Tarango sent at 3/25/2022  1:12 PM EDT -----  Subject: Message to Provider    QUESTIONS  Information for Provider? The patient has called in requesting to speak   with the nurse about documents for his job (FMLA). Requesting a call back  ---------------------------------------------------------------------------  --------------  CALL BACK INFO  What is the best way for the office to contact you? OK to leave message on   voicemail  Preferred Call Back Phone Number? [phone]  ---------------------------------------------------------------------------  --------------  SCRIPT ANSWERS  Relationship to Patient?  Self

## 2022-05-11 ENCOUNTER — OFFICE VISIT (OUTPATIENT)
Dept: FAMILY MEDICINE CLINIC | Age: 57
End: 2022-05-11
Payer: COMMERCIAL

## 2022-05-11 VITALS
HEART RATE: 88 BPM | DIASTOLIC BLOOD PRESSURE: 108 MMHG | BODY MASS INDEX: 42.95 KG/M2 | WEIGHT: 250.2 LBS | TEMPERATURE: 98.4 F | OXYGEN SATURATION: 97 % | RESPIRATION RATE: 18 BRPM | SYSTOLIC BLOOD PRESSURE: 166 MMHG

## 2022-05-11 DIAGNOSIS — F06.4 ANXIETY DISORDER DUE TO KNOWN PHYSIOLOGICAL CONDITION: ICD-10-CM

## 2022-05-11 DIAGNOSIS — G47.33 OSA (OBSTRUCTIVE SLEEP APNEA): ICD-10-CM

## 2022-05-11 DIAGNOSIS — I10 ESSENTIAL HYPERTENSION, BENIGN: Primary | ICD-10-CM

## 2022-05-11 DIAGNOSIS — E11.9 TYPE 2 DIABETES MELLITUS WITHOUT COMPLICATION, WITHOUT LONG-TERM CURRENT USE OF INSULIN (HCC): ICD-10-CM

## 2022-05-11 DIAGNOSIS — G47.9 SLEEP DISORDER: ICD-10-CM

## 2022-05-11 LAB — HBA1C MFR BLD HPLC: 7 %

## 2022-05-11 PROCEDURE — 99213 OFFICE O/P EST LOW 20 MIN: CPT | Performed by: FAMILY MEDICINE

## 2022-05-11 PROCEDURE — 3051F HG A1C>EQUAL 7.0%<8.0%: CPT | Performed by: FAMILY MEDICINE

## 2022-05-11 PROCEDURE — 83036 HEMOGLOBIN GLYCOSYLATED A1C: CPT | Performed by: FAMILY MEDICINE

## 2022-05-11 RX ORDER — VENLAFAXINE HYDROCHLORIDE 37.5 MG/1
37.5 CAPSULE, EXTENDED RELEASE ORAL DAILY
Qty: 30 CAPSULE | Refills: 5 | Status: SHIPPED | OUTPATIENT
Start: 2022-05-11 | End: 2022-05-31 | Stop reason: SDUPTHER

## 2022-05-11 NOTE — LETTER
NOTIFICATION OF RETURN TO WORK / SCHOOL    5/11/2022 12:49 PM    Mr. Deangelo Harman  [address] [facility] [address] [address]  . To Whom It May Concern:    Eugenio Wall III was under the care of [facility] from 5/5/22. He will be able to return to work/school on 5/16/22 with no restrictions. If there are questions or concerns please have the patient contact our office.     Sincerely,      Venkat Grady MD

## 2022-05-11 NOTE — PROGRESS NOTES
HISTORY OF PRESENT ILLNESS  Alaina Madrid III is a 61 y.o. male. feeling very stressed by family issues,ill mother and son in [employer] . Having daytime drowsiness secondary to poor sleep from anxiety. F/U HBP,DM2,WESTLEY. Headache  The history is provided by the patient. This is a new problem. The current episode started more than 2 days ago. The problem occurs daily. The problem has not changed since onset. Associated symptoms include headaches. Pertinent negatives include no chest pain and no shortness of breath. Hypertension   The history is provided by the patient. The problem has been rapidly worsening. Associated symptoms include anxiety, malaise/fatigue and headaches. Pertinent negatives include no chest pain, no orthopnea, no palpitations and no shortness of breath. Anxiety  The history is provided by the patient. This is a chronic problem. The problem occurs daily. The problem has been gradually worsening. Associated symptoms include headaches. Pertinent negatives include no chest pain and no shortness of breath. Sleep Problem  The history is provided by the patient. This is a chronic problem. The problem occurs daily. The problem has been gradually worsening. Associated symptoms include headaches. Pertinent negatives include no chest pain and no shortness of breath. Diabetes  The history is provided by the patient. This is a chronic problem. The problem occurs daily. The problem has been gradually improving. Associated symptoms include headaches. Pertinent negatives include no chest pain and no shortness of breath. Review of Systems   Constitutional: Positive for malaise/fatigue. Respiratory: Negative for shortness of breath. Cardiovascular: Negative for chest pain, palpitations and orthopnea. Gastrointestinal: Negative for blood in stool, constipation, diarrhea and heartburn. Genitourinary: Negative for frequency. Musculoskeletal: Positive for joint pain.    Neurological: Positive for headaches. Psychiatric/Behavioral: Positive for depression. The patient is nervous/anxious and has insomnia. Physical Exam  Vitals reviewed. Constitutional:       Appearance: Normal appearance. He is obese. HENT:      Head: Normocephalic and atraumatic. Right Ear: Tympanic membrane normal.      Left Ear: Tympanic membrane normal.   Cardiovascular:      Rate and Rhythm: Normal rate and regular rhythm. Pulses: Normal pulses. Heart sounds: Normal heart sounds. Pulmonary:      Effort: Pulmonary effort is normal.      Breath sounds: Normal breath sounds. Abdominal:      General: Abdomen is flat. Palpations: Abdomen is soft. Musculoskeletal:      Cervical back: Normal range of motion and neck supple. Skin:     General: Skin is warm and dry. Neurological:      Mental Status: He is alert and oriented to person, place, and time. Psychiatric:      Comments: Tearful,anxious         Diagnoses and all orders for this visit:    1. Essential hypertension, benign,controlled    2. Type 2 diabetes mellitus without complication, without long-term current use of insulin (HCC),controlled  -     AMB POC HEMOGLOBIN A1C    3. Sleep disorder  -     suvorexant (BELSOMRA) 5 mg tablet; Take 1 Tablet by mouth nightly as needed for Insomnia. Max Daily Amount: 5 mg.  -     SLEEP MEDICINE REFERRAL    4. WESTLEY (obstructive sleep apnea),to f/u with Dr Florencia Costello    5. Anxiety disorder due to known physiological condition  -     venlafaxine-SR (EFFEXOR-XR) 37.5 mg capsule; Take 1 Capsule by mouth daily.  -     REFERRAL TO PSYCHOLOGY      Follow-up and Dispositions    · Return in about 2 weeks (around 5/25/2022). Follow-up and Dispositions    · Return in about 2 weeks (around 5/25/2022).

## 2022-05-11 NOTE — PROGRESS NOTES
Chief Complaint   Patient presents with    Sleep Problem     Pt state he is falling asleep at work and at stop lights while driving. 1. \"Have you been to the ER, urgent care clinic since your last visit? Hospitalized since your last visit? \" No    2. \"Have you seen or consulted any other health care providers outside of the [address] [place] [address] [place] since your last visit? \" No     3. For patients aged 39-70: Has the patient had a colonoscopy / FIT/ Cologuard? Yes - Care Gap present. Most recent result on file      If the patient is female:    4. For patients aged 41-77: Has the patient had a mammogram within the past 2 years? NA - based on age or sex      11. For patients aged 21-65: Has the patient had a pap smear?  NA - based on age or sex    Health Maintenance Due   Topic Date Due    COVID-19 Vaccine (1) Never done    Pneumococcal 0-64 years (1 - PCV) Never done    Shingrix Vaccine Age 50> (1 of 2) Never done    Foot Exam Q1  08/13/2020    MICROALBUMIN Q1  08/13/2020

## 2022-05-11 NOTE — LETTER
NOTIFICATION OF RETURN TO WORK / SCHOOL    5/11/2022 12:51 PM    Mr. Tammy Andrews  [address] [address]  . To Whom It May Concern:    Rebecca Paige III was under the care of [facility] from 5/4/22. He will be able to return to work/school on 5/16/22 with no restrictions. If there are questions or concerns please have the patient contact our office.     Sincerely,      Merlyn Marquez MD

## 2022-05-31 DIAGNOSIS — F06.4 ANXIETY DISORDER DUE TO KNOWN PHYSIOLOGICAL CONDITION: ICD-10-CM

## 2022-05-31 RX ORDER — VENLAFAXINE HYDROCHLORIDE 37.5 MG/1
37.5 CAPSULE, EXTENDED RELEASE ORAL DAILY
Qty: 90 CAPSULE | Refills: 1 | Status: SHIPPED | OUTPATIENT
Start: 2022-05-31 | End: 2022-08-16 | Stop reason: ALTCHOICE

## 2022-05-31 NOTE — TELEPHONE ENCOUNTER
Pharmacy sent fax requesting a 90 day supply for patients venlafaxine er 37.5mg. Last visit:5/11/22  Next visit: not scheduled  Previous refill 5/11/22(30 + 5R)    Requested Prescriptions     Pending Prescriptions Disp Refills    venlafaxine-SR (EFFEXOR-XR) 37.5 mg capsule 90 Capsule 1     Sig: Take 1 Capsule by mouth daily.

## 2022-06-08 ENCOUNTER — TELEPHONE (OUTPATIENT)
Dept: FAMILY MEDICINE CLINIC | Age: 57
End: 2022-06-08

## 2022-06-08 NOTE — TELEPHONE ENCOUNTER
----- Message from Navjot Silva sent at 6/7/2022  1:11 PM EDT -----  Subject: Message to Provider    QUESTIONS  Information for Provider? requesting a call back from the nurse, no   details. just said he has a question  ---------------------------------------------------------------------------  --------------  CALL BACK INFO  What is the best way for the office to contact you? OK to leave message on   voicemail  Preferred Call Back Phone Number? [phone]  ---------------------------------------------------------------------------  --------------  SCRIPT ANSWERS  Relationship to Patient?  Self

## 2022-06-15 ENCOUNTER — OFFICE VISIT (OUTPATIENT)
Dept: FAMILY MEDICINE CLINIC | Age: 57
End: 2022-06-15
Payer: COMMERCIAL

## 2022-06-15 VITALS
HEIGHT: 64 IN | WEIGHT: 262 LBS | SYSTOLIC BLOOD PRESSURE: 154 MMHG | BODY MASS INDEX: 44.73 KG/M2 | RESPIRATION RATE: 18 BRPM | OXYGEN SATURATION: 98 % | HEART RATE: 97 BPM | TEMPERATURE: 98.5 F | DIASTOLIC BLOOD PRESSURE: 112 MMHG

## 2022-06-15 DIAGNOSIS — G47.33 OSA (OBSTRUCTIVE SLEEP APNEA): ICD-10-CM

## 2022-06-15 DIAGNOSIS — C67.9 MALIGNANT NEOPLASM OF URINARY BLADDER, UNSPECIFIED SITE (HCC): ICD-10-CM

## 2022-06-15 DIAGNOSIS — I10 ESSENTIAL HYPERTENSION, BENIGN: ICD-10-CM

## 2022-06-15 DIAGNOSIS — M54.9 MID BACK PAIN: Primary | ICD-10-CM

## 2022-06-15 DIAGNOSIS — E11.9 TYPE 2 DIABETES MELLITUS WITHOUT COMPLICATION, WITHOUT LONG-TERM CURRENT USE OF INSULIN (HCC): ICD-10-CM

## 2022-06-15 LAB
BILIRUB UR QL STRIP: NEGATIVE
GLUCOSE UR-MCNC: NEGATIVE MG/DL
KETONES P FAST UR STRIP-MCNC: NEGATIVE MG/DL
PH UR STRIP: 6 [PH] (ref 4.6–8)
PROT UR QL STRIP: NEGATIVE
SP GR UR STRIP: 1.02 (ref 1–1.03)
UA UROBILINOGEN AMB POC: NORMAL (ref 0.2–1)
URINALYSIS CLARITY POC: CLEAR
URINALYSIS COLOR POC: YELLOW
URINE BLOOD POC: NEGATIVE
URINE LEUKOCYTES POC: NEGATIVE
URINE NITRITES POC: NEGATIVE

## 2022-06-15 PROCEDURE — 81003 URINALYSIS AUTO W/O SCOPE: CPT | Performed by: FAMILY MEDICINE

## 2022-06-15 PROCEDURE — 3051F HG A1C>EQUAL 7.0%<8.0%: CPT | Performed by: FAMILY MEDICINE

## 2022-06-15 PROCEDURE — 99213 OFFICE O/P EST LOW 20 MIN: CPT | Performed by: FAMILY MEDICINE

## 2022-06-15 NOTE — PROGRESS NOTES
HISTORY OF PRESENT ILLNESS  Yanet Peters III is a 61 y.o. male. c/o 1 week bilateral flank pain ,intermittently,no known triggers,no apparent injury. Did not takr \e BP meds this am.No abdominal discomcort,change in bowel habits  Back Pain   The history is provided by the patient. The current episode started more than 1 week ago. The problem has not changed since onset. The pain is associated with no known injury. The pain is present in the middle back. The quality of the pain is described as shooting. The pain does not radiate. The pain is at a severity of 4/10. The pain is moderate. The pain is worse during the day. Pertinent negatives include no fever, no abdominal pain, no bladder incontinence, no dysuria, no pelvic pain, no leg pain and no paresthesias. Review of Systems   Constitutional: Negative for chills, fever and malaise/fatigue. Respiratory: Negative for cough. Gastrointestinal: Negative for abdominal pain. Genitourinary: Negative for bladder incontinence, dysuria, frequency, pelvic pain and urgency. Musculoskeletal: Positive for back pain. Neurological: Negative for paresthesias. Physical Exam  Constitutional:       Appearance: He is obese. HENT:      Head: Normocephalic and atraumatic. Right Ear: Tympanic membrane normal.      Left Ear: Tympanic membrane normal.   Cardiovascular:      Rate and Rhythm: Normal rate and regular rhythm. Heart sounds: Normal heart sounds. Pulmonary:      Breath sounds: Normal breath sounds. Abdominal:      Palpations: Abdomen is soft. There is no mass. Tenderness: There is no abdominal tenderness. Musculoskeletal:      Thoracic back: Normal. No deformity, tenderness or bony tenderness. Normal range of motion. Lumbar back: Normal.   Neurological:      Mental Status: He is alert. ASSESSMENT and PLAN  Diagnoses and all orders for this visit:    1.  Mid back pain,udip ,spine films ok;reassure,recommend tylenol,light activities,call prn  -     AMB POC URINALYSIS DIP STICK AUTO W/O MICRO    2. Essential hypertension, benign    3. Type 2 diabetes mellitus without complication, without long-term current use of insulin ([facility] [address]    4. WESTLEY (obstructive sleep apnea)    5. Malignant neoplasm of urinary bladder, unspecified site [facility])      Follow-up and Dispositions    · Return in about 2 months (around 8/15/2022).

## 2022-06-15 NOTE — PROGRESS NOTES
Chief Complaint   Patient presents with    Back Pain     Pt state he has back pain. 1. \"Have you been to the ER, urgent care clinic since your last visit? Hospitalized since your last visit? \" No    2. \"Have you seen or consulted any other health care providers outside of the [address] [place] [address] since your last visit? \" No     3. For patients aged 39-70: Has the patient had a colonoscopy / FIT/ Cologuard? Yes - Care Gap present. Most recent result on file      If the patient is female:    4. For patients aged 41-77: Has the patient had a mammogram within the past 2 years? NA - based on age or sex      11. For patients aged 21-65: Has the patient had a pap smear?  NA - based on age or sex    Health Maintenance Due   Topic Date Due    COVID-19 Vaccine (1) Never done    Pneumococcal 0-64 years (1 - PCV) Never done    Shingrix Vaccine Age 50> (1 of 2) Never done    Foot Exam Q1  08/13/2020    MICROALBUMIN Q1  08/13/2020

## 2022-08-11 ENCOUNTER — TELEPHONE (OUTPATIENT)
Dept: FAMILY MEDICINE CLINIC | Age: 57
End: 2022-08-11

## 2022-08-11 NOTE — TELEPHONE ENCOUNTER
----- Message from Jerad Marie sent at 8/11/2022  3:57 PM EDT -----  Subject: Message to Provider    QUESTIONS  Information for Provider? pt needs to schd appt for a cortisone shot in   his rt knee--please call pt to help set up appt  ---------------------------------------------------------------------------  --------------  [address]  [phone]; OK to leave message on voicemail  ---------------------------------------------------------------------------  --------------  SCRIPT ANSWERS  Relationship to Patient?  Self

## 2022-08-15 ENCOUNTER — TELEPHONE (OUTPATIENT)
Dept: SLEEP MEDICINE | Age: 57
End: 2022-08-15

## 2022-08-15 PROBLEM — F14.10 COCAINE ABUSE (HCC): Status: ACTIVE | Noted: 2022-08-15

## 2022-08-16 ENCOUNTER — OFFICE VISIT (OUTPATIENT)
Dept: FAMILY MEDICINE CLINIC | Age: 57
End: 2022-08-16
Payer: COMMERCIAL

## 2022-08-16 VITALS
DIASTOLIC BLOOD PRESSURE: 105 MMHG | BODY MASS INDEX: 42.27 KG/M2 | OXYGEN SATURATION: 96 % | WEIGHT: 247.6 LBS | HEART RATE: 91 BPM | HEIGHT: 64 IN | SYSTOLIC BLOOD PRESSURE: 168 MMHG | RESPIRATION RATE: 16 BRPM | TEMPERATURE: 98.2 F

## 2022-08-16 DIAGNOSIS — G47.33 OSA (OBSTRUCTIVE SLEEP APNEA): ICD-10-CM

## 2022-08-16 DIAGNOSIS — M17.11 PRIMARY OSTEOARTHRITIS OF RIGHT KNEE: Primary | ICD-10-CM

## 2022-08-16 DIAGNOSIS — E78.2 MIXED HYPERLIPIDEMIA: ICD-10-CM

## 2022-08-16 DIAGNOSIS — E11.9 TYPE 2 DIABETES MELLITUS WITHOUT COMPLICATION, WITHOUT LONG-TERM CURRENT USE OF INSULIN (HCC): ICD-10-CM

## 2022-08-16 DIAGNOSIS — I10 ESSENTIAL HYPERTENSION, BENIGN: ICD-10-CM

## 2022-08-16 LAB — HBA1C MFR BLD HPLC: 6.9 %

## 2022-08-16 PROCEDURE — 83036 HEMOGLOBIN GLYCOSYLATED A1C: CPT | Performed by: FAMILY MEDICINE

## 2022-08-16 PROCEDURE — 3044F HG A1C LEVEL LT 7.0%: CPT | Performed by: FAMILY MEDICINE

## 2022-08-16 PROCEDURE — 3078F DIAST BP <80 MM HG: CPT | Performed by: FAMILY MEDICINE

## 2022-08-16 PROCEDURE — 20610 DRAIN/INJ JOINT/BURSA W/O US: CPT | Performed by: FAMILY MEDICINE

## 2022-08-16 PROCEDURE — 3074F SYST BP LT 130 MM HG: CPT | Performed by: FAMILY MEDICINE

## 2022-08-16 PROCEDURE — 99214 OFFICE O/P EST MOD 30 MIN: CPT | Performed by: FAMILY MEDICINE

## 2022-08-16 RX ORDER — TRIAMCINOLONE ACETONIDE 40 MG/ML
40 INJECTION, SUSPENSION INTRA-ARTICULAR; INTRAMUSCULAR ONCE
Qty: 1 ML | Refills: 0
Start: 2022-08-16 | End: 2022-08-16

## 2022-08-16 RX ORDER — BUPIVACAINE HYDROCHLORIDE 2.5 MG/ML
1 INJECTION, SOLUTION EPIDURAL; INFILTRATION; INTRACAUDAL ONCE
Qty: 1 ML | Refills: 0
Start: 2022-08-16 | End: 2022-08-16

## 2022-08-16 NOTE — PROGRESS NOTES
Chief Complaint   Patient presents with    Diabetes     2 month f/u    Knee Pain     Right        Pt states he has not taken his blood pressure medication in 2 days. 1. \"Have you been to the ER, urgent care clinic since your last visit? Hospitalized since your last visit? \" No    2. \"Have you seen or consulted any other health care providers outside of the [address] [place] [address] since your last visit? \" No     3. For patients aged 39-70: Has the patient had a colonoscopy / FIT/ Cologuard? Yes - no Care Gap present      If the patient is female:    4. For patients aged 41-77: Has the patient had a mammogram within the past 2 years? NA - based on age or sex      11. For patients aged 21-65: Has the patient had a pap smear?  NA - based on age or sex    Visit Vitals  BP (!) 190/120 (BP 1 Location: Right arm, BP Patient Position: Sitting)   Pulse 91   Temp 98.2 °F (36.8 °C) (Oral)   Resp 16   Ht 5' 4\" (1.626 m)   Wt 247 lb 9.6 oz (112.3 kg)   SpO2 96%   BMI 42.50 kg/m²

## 2022-08-16 NOTE — LETTER
NOTIFICATION OF RETURN TO WORK / SCHOOL    8/16/2022 10:32 AM    Mr. Cary Gibson  [address] [facility] [address] [phone]  . To Whom It May Concern:    Dianelys Lawson III was under the care of [facility] from 8/7/22. He will be able to return to work/school on 8/17/22 with no restrictions. If there are questions or concerns please have the patient contact our office.     Sincerely,      Dionne Barcenas MD

## 2022-08-16 NOTE — PROGRESS NOTES
HISTORY OF PRESENT ILLNESS  Jordyn Garcia III is a 61 y.o. male. c/o worsening rt knee pain and swelling. Has been out of work since 8/6/22. F/U HBP,DM,Gout. Has not taken meds this am!!.Feeling ok,stressed  Blood Pressure Check  The history is provided by the Patient. This is a chronic problem. The problem occurs daily. The problem has been rapidly worsening. Pertinent negatives include no chest pain. Knee Pain   The history is provided by the Patient. This is a chronic problem. The problem occurs daily. The problem has been gradually worsening. The pain is present in the right knee. The quality of the pain is described as dull. The pain is at a severity of 7/10. The pain is severe. Diabetes  The history is provided by the Patient. This is a chronic problem. The problem occurs daily. The problem has not changed since onset. Pertinent negatives include no chest pain. Review of Systems   Constitutional:  Negative for chills, fever and malaise/fatigue. Cardiovascular:  Negative for chest pain and palpitations. Genitourinary:  Negative for frequency. Musculoskeletal:  Positive for joint pain. Physical Exam  Constitutional:       Appearance: He is obese. HENT:      Head: Normocephalic and atraumatic. Cardiovascular:      Rate and Rhythm: Normal rate and regular rhythm. Heart sounds: Normal heart sounds. Pulmonary:      Breath sounds: Normal breath sounds. Musculoskeletal:      Right knee: Swelling, deformity, bony tenderness and crepitus present. Decreased range of motion. Skin:     General: Skin is warm and dry. Neurological:      Mental Status: He is alert. Diagnoses and all orders for this visit:    1. Primary osteoarthritis of right knee,severe I/A steroids given    2. Essential hypertension, benign,uncontrolled,not takng meds!!,meds reviewed and compliance stressed    3.  Type 2 diabetes mellitus without complication, without long-term current use of insulin (HCC),good control  - AMB POC HEMOGLOBIN A1C    4. Mixed hyperlipidemia    5. WESTLEY (obstructive sleep apnea)      Indications:   Symptom relief from  Right Knee osteoarthritis    Procedure:  After consent was obtained,and procedure risks and benefits reviewed,using sterile technique the Rt Knee joint   was prepped using Betadine. The joint was entered usinga 23 ga needle and 40 mg of Kenalog   and 1 ml Marcaine were injected without difficulty ,and the needle was withdrawn. The procedure was well tolerated,with negligile discomfort postprocedure. The patient was asked to continue to rest the joint fora few days before resuming normal activities. They were advised that there may be increased pain for the next 1-2 days,and to watch for fever,redness,or increased swelling or pain. They were advised to call if such symptoms develop. Joint Injection instruction sheet was given to the patient and reviewed. The patient departed in good condition

## 2022-08-16 NOTE — PROGRESS NOTES
[address]  OFFICE PROCEDURE PROGRESS NOTE        Chart reviewed for the following:   Steffanie Dinero, have reviewed the History, Physical and updated the Allergic reactions for [address] performed immediately prior to start of procedure:   Steffanie Dinero, have performed the following reviews on [employer] prior to the start of the procedure:            * Patient was identified by name and date of birth   * Agreement on procedure being performed was verified  * Risks and Benefits explained to the patient  * Procedure site verified and marked as necessary  * Patient was positioned for comfort  * Consent was signed and verified     Time: 10:44 am      Date of procedure: 8/16/2022    Procedure performed by: Mina Patel MD    Provider assisted by:   N/A    Patient assisted by: N/A    How tolerated by patient: tolerated the procedure well with no complications    Post Procedural Pain Scale: 6 - Hurts Even More    Comments: Pt received Right knee joint injection and tolerated it well.

## 2022-08-21 PROBLEM — M17.11 PRIMARY OSTEOARTHRITIS OF RIGHT KNEE: Status: ACTIVE | Noted: 2022-08-21

## 2022-08-26 ENCOUNTER — TELEPHONE (OUTPATIENT)
Dept: FAMILY MEDICINE CLINIC | Age: 57
End: 2022-08-26

## 2022-08-26 NOTE — TELEPHONE ENCOUNTER
Patient is calling to see if his short-term disability paperwork is complete. Please call @[phone].

## 2022-08-30 ENCOUNTER — TELEPHONE (OUTPATIENT)
Dept: FAMILY MEDICINE CLINIC | Age: 57
End: 2022-08-30

## 2023-01-11 ENCOUNTER — OFFICE VISIT (OUTPATIENT)
Dept: FAMILY MEDICINE CLINIC | Age: 58
End: 2023-01-11

## 2023-01-11 VITALS
HEART RATE: 89 BPM | WEIGHT: 253.4 LBS | BODY MASS INDEX: 43.26 KG/M2 | HEIGHT: 64 IN | DIASTOLIC BLOOD PRESSURE: 105 MMHG | OXYGEN SATURATION: 98 % | SYSTOLIC BLOOD PRESSURE: 173 MMHG

## 2023-01-11 DIAGNOSIS — E11.9 TYPE 2 DIABETES MELLITUS WITHOUT COMPLICATION, WITHOUT LONG-TERM CURRENT USE OF INSULIN (HCC): Primary | ICD-10-CM

## 2023-01-11 DIAGNOSIS — I10 ESSENTIAL HYPERTENSION, BENIGN: ICD-10-CM

## 2023-01-11 LAB — HBA1C MFR BLD HPLC: 7.1 %

## 2023-01-11 RX ORDER — GLIMEPIRIDE 4 MG/1
4 TABLET ORAL DAILY
Qty: 30 TABLET | Refills: 5 | Status: SHIPPED | OUTPATIENT
Start: 2023-01-11

## 2023-01-11 RX ORDER — VALSARTAN 160 MG/1
160 TABLET ORAL DAILY
Qty: 30 TABLET | Refills: 5 | Status: SHIPPED | OUTPATIENT
Start: 2023-01-11

## 2023-01-11 RX ORDER — BISOPROLOL FUMARATE AND HYDROCHLOROTHIAZIDE 10; 6.25 MG/1; MG/1
1 TABLET ORAL DAILY
Qty: 30 TABLET | Refills: 0 | Status: SHIPPED | OUTPATIENT
Start: 2023-01-11

## 2023-01-11 NOTE — PROGRESS NOTES
Chief Complaint   Patient presents with    Follow-up     Patient is here for a form that he needs filled out and a1c check     1. \"Have you been to the ER, urgent care clinic since your last visit? Hospitalized since your last visit? \" No    2. \"Have you seen or consulted any other health care providers outside of the [address] [place] [address] since your last visit? \" No     3. For patients aged 39-70: Has the patient had a colonoscopy / FIT/ Cologuard? Yes - no Care Gap present      If the patient is female:    4. For patients aged 41-77: Has the patient had a mammogram within the past 2 years? NA - based on age or sex      11. For patients aged 21-65: Has the patient had a pap smear?  NA - based on age or sex

## 2023-02-20 ENCOUNTER — NURSE TRIAGE (OUTPATIENT)
Dept: OTHER | Facility: CLINIC | Age: 58
End: 2023-02-20

## 2023-02-20 NOTE — TELEPHONE ENCOUNTER
Location of patient: [address] gregory from [place] [facility] with [employer]. Subjective: Caller states \"I have had problems for a while - it is arthritis\"     Current Symptoms: right knee swelling, right knee pain    Onset: Ongoing issue    Associated Symptoms: reduced activity    Pain Severity: 5/10; aching; Intermittent    Temperature: denies    What has been tried: Ibuprofen    LMP: NA Pregnant: NA    Recommended disposition: See in Office Today or Tomorrow    Care advice provided, patient verbalizes understanding; denies any other questions or concerns; instructed to call back for any new or worsening symptoms. Patient/Caller agrees with recommended disposition; writer provided warm transfer to [facility] for appointment scheduling    Attention Provider: Thank you for allowing me to participate in the care of your patient. The patient was connected to triage in response to information provided to the [facility]. Please do not respond through this encounter as the response is not directed to a shared pool.     Reason for Disposition   Patient wants to be seen    Protocols used: Knee Swelling-ADULT-[place]

## 2023-02-24 ENCOUNTER — OFFICE VISIT (OUTPATIENT)
Dept: FAMILY MEDICINE CLINIC | Age: 58
End: 2023-02-24

## 2023-02-24 VITALS
WEIGHT: 253.6 LBS | SYSTOLIC BLOOD PRESSURE: 185 MMHG | TEMPERATURE: 96.5 F | HEART RATE: 92 BPM | DIASTOLIC BLOOD PRESSURE: 115 MMHG | OXYGEN SATURATION: 94 % | BODY MASS INDEX: 43.29 KG/M2 | HEIGHT: 64 IN | RESPIRATION RATE: 18 BRPM

## 2023-02-24 DIAGNOSIS — E78.2 MIXED HYPERLIPIDEMIA: ICD-10-CM

## 2023-02-24 DIAGNOSIS — G47.33 OSA (OBSTRUCTIVE SLEEP APNEA): ICD-10-CM

## 2023-02-24 DIAGNOSIS — E11.9 TYPE 2 DIABETES MELLITUS WITHOUT COMPLICATION, WITHOUT LONG-TERM CURRENT USE OF INSULIN (HCC): Primary | ICD-10-CM

## 2023-02-24 DIAGNOSIS — I10 ESSENTIAL HYPERTENSION, BENIGN: ICD-10-CM

## 2023-02-24 DIAGNOSIS — E66.01 OBESITY, CLASS III, BMI 40-49.9 (MORBID OBESITY) (HCC): ICD-10-CM

## 2023-02-24 DIAGNOSIS — M17.11 PRIMARY OSTEOARTHRITIS OF RIGHT KNEE: ICD-10-CM

## 2023-02-24 DIAGNOSIS — C67.9 MALIGNANT NEOPLASM OF URINARY BLADDER, UNSPECIFIED SITE (HCC): ICD-10-CM

## 2023-02-24 NOTE — PROGRESS NOTES
Chief Complaint   Patient presents with    Diabetes     F/U on diabetes. Hypertension     F/U on BP. Cholesterol Problem     F/U on cholesterol. 1. \"Have you been to the ER, urgent care clinic since your last visit? Hospitalized since your last visit? \" No    2. \"Have you seen or consulted any other health care providers outside of the [address] [place] [address] since your last visit? \" No     3. For patients aged 39-70: Has the patient had a colonoscopy / FIT/ Cologuard? No      If the patient is female:    4. For patients aged 41-77: Has the patient had a mammogram within the past 2 years? NA - based on age or sex      11. For patients aged 21-65: Has the patient had a pap smear?  NA - based on age or sex    Health Maintenance Due   Topic Date Due    Pneumococcal 0-64 years (1 - PCV) Never done    Hepatitis B Vaccine (1 of 3 - Risk 3-dose series) Never done    Shingles Vaccine (1 of 2) Never done    Diabetic Alb to Cr ratio (uACR) test  08/13/2020    Foot Exam Q1  08/13/2020    COVID-19 Vaccine (3 - Booster for Pfizer series) 06/16/2021    Flu Vaccine (1) 08/01/2022    GFR test (Diabetes, CKD 3-4, OR last GFR 15-59)  11/29/2022    Lipid Screen  11/29/2022

## 2023-02-24 NOTE — PROGRESS NOTES
HISTORY OF PRESENT ILLNESS  Abilio Cordova III is a 61 y.o. male. f/u hbp ,dm2,rt knee oa,stress. Has been unable to work for past 2 weks due to knee oa. Diabetes  The history is provided by the Patient. This is a chronic problem. The problem occurs daily. The problem has not changed since onset. Pertinent negatives include no chest pain and no shortness of breath. Hypertension   The history is provided by the Patient. This is a chronic problem. The problem has been gradually worsening. Pertinent negatives include no chest pain, no malaise/fatigue, no dizziness and no shortness of breath. Cholesterol Problem  Pertinent negatives include no chest pain and no shortness of breath. Knee Pain   The history is provided by the Patient. This is a chronic problem. The problem occurs daily. The problem has been gradually worsening. The pain is present in the right knee. Review of Systems   Constitutional:  Negative for fever and malaise/fatigue. Respiratory:  Negative for shortness of breath. Cardiovascular:  Negative for chest pain. Musculoskeletal:  Positive for joint pain. Neurological:  Negative for dizziness. Psychiatric/Behavioral:  The patient is nervous/anxious. Physical Exam  Constitutional:       Appearance: Normal appearance. He is obese. HENT:      Head: Normocephalic and atraumatic. Cardiovascular:      Rate and Rhythm: Normal rate and regular rhythm. Pulses: Normal pulses. Heart sounds: Normal heart sounds. Pulmonary:      Breath sounds: Normal breath sounds. Abdominal:      Palpations: Abdomen is soft. Musculoskeletal:      Right knee: Swelling, deformity and crepitus present. Decreased range of motion. Left knee: Normal.   Skin:     General: Skin is warm and dry. Under sterile prep,[facility] ,1 cc Marcaine injected Intraarticularly without complication,per pts request  ASSESSMENT and PLAN  Diagnoses and all orders for this visit:    1.  Type 2 diabetes mellitus without complication, without long-term current use of insulin (HCC)    2. Obesity, Class III, BMI 40-49.9 (morbid obesity) ([facility]    3. Essential hypertension, benign    4. Primary osteoarthritis of right knee    5. Mixed hyperlipidemia    6. WESTLEY (obstructive sleep apnea)    7.  Malignant neoplasm of urinary bladder, unspecified site ([facility]

## 2023-02-24 NOTE — LETTER
NOTIFICATION OF RETURN TO WORK / SCHOOL    2/24/2023 10:46 AM    Mr. Dominic Funk  [facility] [phone]  . To Whom It May Concern:    Eunice Castorena III was under the care of [facility] from 2/13/23. He will be able to return to work/school on 2/25/23with no restrictions. If there are questions or concerns please have the patient contact our office.     Sincerely,      Raven Randhawa MD

## 2023-03-18 ENCOUNTER — APPOINTMENT (OUTPATIENT)
Dept: CT IMAGING | Age: 58
End: 2023-03-18
Attending: EMERGENCY MEDICINE

## 2023-03-18 ENCOUNTER — HOSPITAL ENCOUNTER (EMERGENCY)
Age: 58
Discharge: HOME | End: 2023-03-18
Attending: EMERGENCY MEDICINE

## 2023-03-18 VITALS
OXYGEN SATURATION: 97 % | RESPIRATION RATE: 16 BRPM | BODY MASS INDEX: 41.83 KG/M2 | HEART RATE: 68 BPM | SYSTOLIC BLOOD PRESSURE: 172 MMHG | HEIGHT: 64 IN | DIASTOLIC BLOOD PRESSURE: 98 MMHG | TEMPERATURE: 98.2 F | WEIGHT: 245 LBS

## 2023-03-18 DIAGNOSIS — R11.2 NAUSEA AND VOMITING, UNSPECIFIED VOMITING TYPE: ICD-10-CM

## 2023-03-18 DIAGNOSIS — I10 ACCELERATED HYPERTENSION: ICD-10-CM

## 2023-03-18 DIAGNOSIS — R10.13 ABDOMINAL PAIN, EPIGASTRIC: Primary | ICD-10-CM

## 2023-03-18 LAB
ALBUMIN SERPL-MCNC: 3.5 G/DL (ref 3.5–5)
ALBUMIN/GLOB SERPL: 0.8 (ref 1.1–2.2)
ALP SERPL-CCNC: 119 U/L (ref 45–117)
ALT SERPL-CCNC: 22 U/L (ref 12–78)
ANION GAP SERPL CALC-SCNC: 6 MMOL/L (ref 5–15)
APPEARANCE UR: CLEAR
AST SERPL-CCNC: 31 U/L (ref 15–37)
BACTERIA URNS QL MICRO: NEGATIVE /HPF
BILIRUB SERPL-MCNC: 0.7 MG/DL (ref 0.2–1)
BILIRUB UR QL: NEGATIVE
BUN SERPL-MCNC: 13 MG/DL (ref 6–20)
BUN/CREAT SERPL: 14 (ref 12–20)
CALCIUM SERPL-MCNC: 8.9 MG/DL (ref 8.5–10.1)
CHLORIDE SERPL-SCNC: 105 MMOL/L (ref 97–108)
CO2 SERPL-SCNC: 27 MMOL/L (ref 21–32)
COLOR UR: ABNORMAL
CREAT SERPL-MCNC: 0.94 MG/DL (ref 0.7–1.3)
EPITH CASTS URNS QL MICRO: ABNORMAL /LPF
ERYTHROCYTE [DISTWIDTH] IN BLOOD BY AUTOMATED COUNT: 13.5 % (ref 11.5–14.5)
GLOBULIN SER CALC-MCNC: 4.2 G/DL (ref 2–4)
GLUCOSE SERPL-MCNC: 175 MG/DL (ref 65–100)
GLUCOSE UR STRIP.AUTO-MCNC: 100 MG/DL
HCT VFR BLD AUTO: 42 % (ref 36.6–50.3)
HGB BLD-MCNC: 13.9 G/DL (ref 12.1–17)
HGB UR QL STRIP: NEGATIVE
HYALINE CASTS URNS QL MICRO: ABNORMAL /LPF (ref 0–2)
KETONES UR QL STRIP.AUTO: NEGATIVE MG/DL
LEUKOCYTE ESTERASE UR QL STRIP.AUTO: ABNORMAL
LIPASE SERPL-CCNC: 284 U/L (ref 73–393)
MCH RBC QN AUTO: 27.4 PG (ref 26–34)
MCHC RBC AUTO-ENTMCNC: 33.1 G/DL (ref 30–36.5)
MCV RBC AUTO: 82.8 FL (ref 80–99)
NITRITE UR QL STRIP.AUTO: NEGATIVE
NRBC # BLD: 0 K/UL (ref 0–0.01)
NRBC BLD-RTO: 0 PER 100 WBC
PH UR STRIP: 6 (ref 5–8)
PLATELET # BLD AUTO: 355 K/UL (ref 150–400)
PMV BLD AUTO: 10.4 FL (ref 8.9–12.9)
POTASSIUM SERPL-SCNC: 3.5 MMOL/L (ref 3.5–5.1)
PROT SERPL-MCNC: 7.7 G/DL (ref 6.4–8.2)
PROT UR STRIP-MCNC: 30 MG/DL
RBC # BLD AUTO: 5.07 M/UL (ref 4.1–5.7)
RBC #/AREA URNS HPF: ABNORMAL /HPF (ref 0–5)
SODIUM SERPL-SCNC: 138 MMOL/L (ref 136–145)
SP GR UR REFRACTOMETRY: 1.01
TROPONIN I SERPL HS-MCNC: 13 NG/L (ref 0–76)
UA: UC IF INDICATED,UAUC: ABNORMAL
UROBILINOGEN UR QL STRIP.AUTO: 0.2 EU/DL (ref 0.2–1)
WBC # BLD AUTO: 13.1 K/UL (ref 4.1–11.1)
WBC URNS QL MICRO: ABNORMAL /HPF (ref 0–4)

## 2023-03-18 PROCEDURE — 99285 EMERGENCY DEPT VISIT HI MDM: CPT

## 2023-03-18 PROCEDURE — 80053 COMPREHEN METABOLIC PANEL: CPT

## 2023-03-18 PROCEDURE — 96374 THER/PROPH/DIAG INJ IV PUSH: CPT

## 2023-03-18 PROCEDURE — 93005 ELECTROCARDIOGRAM TRACING: CPT

## 2023-03-18 PROCEDURE — 74177 CT ABD & PELVIS W/CONTRAST: CPT

## 2023-03-18 PROCEDURE — 96375 TX/PRO/DX INJ NEW DRUG ADDON: CPT

## 2023-03-18 PROCEDURE — 74011000636 HC RX REV CODE- 636: Performed by: EMERGENCY MEDICINE

## 2023-03-18 PROCEDURE — 74011000250 HC RX REV CODE- 250: Performed by: EMERGENCY MEDICINE

## 2023-03-18 PROCEDURE — 85027 COMPLETE CBC AUTOMATED: CPT

## 2023-03-18 PROCEDURE — 81001 URINALYSIS AUTO W/SCOPE: CPT

## 2023-03-18 PROCEDURE — 83690 ASSAY OF LIPASE: CPT

## 2023-03-18 PROCEDURE — 36415 COLL VENOUS BLD VENIPUNCTURE: CPT

## 2023-03-18 PROCEDURE — 84484 ASSAY OF TROPONIN QUANT: CPT

## 2023-03-18 PROCEDURE — 74011250636 HC RX REV CODE- 250/636: Performed by: EMERGENCY MEDICINE

## 2023-03-18 RX ORDER — ONDANSETRON 4 MG/1
4 TABLET, ORALLY DISINTEGRATING ORAL
Status: COMPLETED | OUTPATIENT
Start: 2023-03-18 | End: 2023-03-18

## 2023-03-18 RX ORDER — ONDANSETRON 4 MG/1
4 TABLET, ORALLY DISINTEGRATING ORAL
Qty: 20 TABLET | Refills: 0 | Status: SHIPPED | OUTPATIENT
Start: 2023-03-18

## 2023-03-18 RX ORDER — ONDANSETRON 2 MG/ML
4 INJECTION INTRAMUSCULAR; INTRAVENOUS
Status: COMPLETED | OUTPATIENT
Start: 2023-03-18 | End: 2023-03-18

## 2023-03-18 RX ADMIN — FAMOTIDINE 20 MG: 10 INJECTION, SOLUTION INTRAVENOUS at 19:04

## 2023-03-18 RX ADMIN — ONDANSETRON 4 MG: 2 INJECTION INTRAMUSCULAR; INTRAVENOUS at 19:04

## 2023-03-18 RX ADMIN — IOMEPROL INJECTION 100 ML: 714 INJECTION, SOLUTION INTRAVASCULAR at 18:38

## 2023-03-18 RX ADMIN — ONDANSETRON 4 MG: 4 TABLET, ORALLY DISINTEGRATING ORAL at 15:33

## 2023-03-18 NOTE — ED NOTES
Patient called ride at this time. MD reviewed discharge instructions with the patient. The patient verbalized understanding. All questions and concerns were addressed. The patient is discharged ambulatory with instructions and prescriptions in hand. Pt is alert and oriented x 4. Respirations are clear and unlabored.

## 2023-03-18 NOTE — ED PROVIDER NOTES
[facility] EMERGENCY DEPT  EMERGENCY DEPARTMENT ENCOUNTER       Pt Name: Ruth Collins  MRN: [id number]  [place] 1965  Date of evaluation: 3/18/2023  Provider: Silvia Perdue MD   PCP: Roddy Holt MD  Note Started: 6:05 PM 3/18/23     CHIEF COMPLAINT       Chief Complaint   Patient presents with    Abdominal Pain     X 1.5 hours. Pt believed he has food poisoning. He has vomited x1. He reports lightheadedness. HISTORY OF PRESENT ILLNESS: 1 or more elements      History From: Patient, History limited by: No limitations     Thad Mcneal III is a 61 y.o. male with history of hypertension and diabetes who presents with chief complaint of diffuse abdominal pain, nausea, vomiting. Symptoms onset today around 1 PM.  Pain is difficult for him to describe, located epigastric region with diffuse radiation. Is associated with nausea with multiple episodes of nonbloody nonbilious emesis. Denies any chest pain, shortness of breath, urinary or bowel changes. Symptoms are unfamiliar to him. He currently feels better after vomiting just upon arrival in ED. Nursing Notes were all reviewed and agreed with or any disagreements were addressed in the HPI. REVIEW OF SYSTEMS        Positives and Pertinent negatives as per HPI.     PAST HISTORY     Past Medical History:  Past Medical History:   Diagnosis Date    Cancer ([facility]     Diabetes ([facility]     ED (erectile dysfunction) 6/15/2010    Encounter for long-term (current) use of other medications 4/10/2011    Essential hypertension, benign 6/15/2010    Family history of colon cancer 2/17/2012    GERD (gastroesophageal reflux disease) 6/15/2010    Headache(784.0)     Hypertension     Migraine syndrome 6/15/2010    Mixed hyperlipidemia 4/10/2011    WESTLEY (obstructive sleep apnea) 6/15/2010    Primary osteoarthritis of right knee 8/21/2022       Past Surgical History:  Past Surgical History:   Procedure Laterality Date    COLONOSCOPY N/A 6/7/2016 COLONOSCOPY performed by Reed Delgadillo MD at [place] [address] [address]  6/7/2016         HX COLONOSCOPY      HX ORTHOPAEDIC      HX UROLOGICAL         Family History:  Family History   Problem Relation Age of Onset    Diabetes Mother     Stroke Mother     Hypertension Mother     Cancer Father     Diabetes Father        Social History:  Social History     Tobacco Use    Smoking status: Never    Smokeless tobacco: Never   Vaping Use    Vaping Use: Never used   Substance Use Topics    Alcohol use: Yes     Alcohol/week: 1.7 standard drinks     Types: 2 Cans of beer per week    Drug use: Not Currently     Types: Cocaine       Allergies: Allergies   Allergen Reactions    Ramipril Cough       CURRENT MEDICATIONS      Discharge Medication List as of 3/18/2023  7:16 PM        CONTINUE these medications which have NOT CHANGED    Details   glimepiride (AMARYL) 4 mg tablet Take 1 Tablet by mouth daily. , Normal, Disp-30 Tablet, R-5      bisoprolol-hydroCHLOROthiazide (ZIAC) 10-6.25 mg per tablet Take 1 Tablet by mouth daily. , Normal, Disp-30 Tablet, R-0      valsartan (DIOVAN) 160 mg tablet Take 1 Tablet by mouth daily. , Normal, Disp-30 Tablet, R-5      allopurinoL (ZYLOPRIM) 100 mg tablet TAKE 1 TABLET BY MOUTH EVERY DAY, Normal, Disp-90 Tablet, R-3      spironolactone (ALDACTONE) 50 mg tablet TAKE 1 TABLET BY MOUTH EVERY DAY, Normal, Disp-90 Tablet, R-1      !! Accu-Chek Xochilt Plus test strp strip USE TO TEST BLOOD DAILY (BEFORE BREAKFAST). , Normal, Disp-100 Strip, R-1      dilTIAZem ER (CARDIZEM CD) 300 mg capsule TAKE 1 CAPSULE BY MOUTH EVERY DAY, Normal, Disp-90 Capsule, R-2      Accu-Chek Xochilt Plus Meter misc Test blood sugar daily before breakfast., Normal, Disp-1 Each, R-0, АННА      Blood-Glucose Meter monitoring kit As directed,daily,fasting, Normal, Disp-1 Kit, R-0      !! glucose blood VI test strips (BLOOD GLUCOSE TEST) strip by Does Not Apply route Daily (before breakfast). , Normal, Disp-100 Strip, R-2       !! - Potential duplicate medications found. Please discuss with provider. SCREENINGS               No data recorded         PHYSICAL EXAM      ED Triage Vitals [03/18/23 1549]   ED Encounter Vitals Group      BP (!) 173/114      Pulse (Heart Rate) 66      Resp Rate 17      Temp 97.3 °F (36.3 °C)      Temp src       O2 Sat (%) 97 %      Weight 245 lb      Height 5' 4\"        Physical Exam  Vitals and nursing note reviewed. Constitutional:       General: He is not in acute distress. Appearance: He is well-developed. He is obese. He is not ill-appearing. Cardiovascular:      Rate and Rhythm: Normal rate and regular rhythm. Heart sounds: No murmur heard. Pulmonary:      Effort: Pulmonary effort is normal. No respiratory distress. Breath sounds: Normal breath sounds. Abdominal:      General: Abdomen is flat. There is no distension. Palpations: Abdomen is soft. There is no mass. Tenderness: There is abdominal tenderness (Mild, diffuse). There is no guarding or rebound. Hernia: No hernia is present. Neurological:      Mental Status: He is alert. DIAGNOSTIC RESULTS   LABS:     Recent Results (from the past 12 hour(s))   CBC W/O DIFF    Collection Time: 03/18/23  4:03 PM   Result Value Ref Range    WBC 13.1 (H) 4.1 - 11.1 K/uL    RBC 5.07 4. 10 - 5.70 M/uL    HGB 13.9 12.1 - 17.0 g/dL    HCT 42.0 36.6 - 50.3 %    MCV 82.8 80.0 - 99.0 FL    MCH 27.4 26.0 - 34.0 PG    MCHC 33.1 30.0 - 36.5 g/dL    RDW 13.5 11.5 - 14.5 %    PLATELET 012 963 - 253 K/uL    MPV 10.4 8.9 - 12.9 FL    NRBC 0.0 0  WBC    ABSOLUTE NRBC 0.00 0.00 - 1.24 K/uL   METABOLIC PANEL, COMPREHENSIVE    Collection Time: 03/18/23  4:03 PM   Result Value Ref Range    Sodium 138 136 - 145 mmol/L    Potassium 3.5 3.5 - 5.1 mmol/L    Chloride 105 97 - 108 mmol/L    CO2 27 21 - 32 mmol/L    Anion gap 6 5 - 15 mmol/L    Glucose 175 (H) 65 - 100 mg/dL    BUN 13 6 - 20 MG/DL Creatinine 0.94 0.70 - 1.30 MG/DL    BUN/Creatinine ratio 14 12 - 20      eGFR >60 >60 ml/min/1.73m2    Calcium 8.9 8.5 - 10.1 MG/DL    Bilirubin, total 0.7 0.2 - 1.0 MG/DL    ALT (SGPT) 22 12 - 78 U/L    AST (SGOT) 31 15 - 37 U/L    Alk. phosphatase 119 (H) 45 - 117 U/L    Protein, total 7.7 6.4 - 8.2 g/dL    Albumin 3.5 3.5 - 5.0 g/dL    Globulin 4.2 (H) 2.0 - 4.0 g/dL    A-G Ratio 0.8 (L) 1.1 - 2.2     LIPASE    Collection Time: 03/18/23  4:03 PM   Result Value Ref Range    Lipase 284 73 - 393 U/L   URINALYSIS W/ REFLEX CULTURE    Collection Time: 03/18/23  4:13 PM    Specimen: Urine   Result Value Ref Range    Color YELLOW/STRAW      Appearance CLEAR CLEAR      Specific gravity 1.015      pH (UA) 6.0 5.0 - 8.0      Protein 30 (A) NEG mg/dL    Glucose 100 (A) NEG mg/dL    Ketone Negative NEG mg/dL    Bilirubin Negative NEG      Blood Negative NEG      Urobilinogen 0.2 0.2 - 1.0 EU/dL    Nitrites Negative NEG      Leukocyte Esterase TRACE (A) NEG      UA:UC IF INDICATED CULTURE NOT INDICATED BY UA RESULT CNI      WBC 5-10 0 - 4 /hpf    RBC 0-5 0 - 5 /hpf    Epithelial cells MODERATE (A) FEW /lpf    Bacteria Negative NEG /hpf    Hyaline cast 2-5 0 - 2 /lpf   EKG, 12 LEAD, INITIAL    Collection Time: 03/18/23  6:08 PM   Result Value Ref Range    Ventricular Rate 66 BPM    Atrial Rate 66 BPM    P-R Interval 134 ms    QRS Duration 146 ms    Q-T Interval 482 ms    QTC Calculation (Bezet) 505 ms    Calculated P Axis 50 degrees    Calculated R Axis 62 degrees    Calculated T Axis 90 degrees    Diagnosis       Normal sinus rhythm  Right bundle branch block  When compared with ECG of 18-OCT-2020 20:45,  Right bundle branch block is now present     TROPONIN-HIGH SENSITIVITY    Collection Time: 03/18/23  6:10 PM   Result Value Ref Range    Troponin-High Sensitivity 13 0 - 76 ng/L        EKG:  If performed, independent interpretation documented below in the MDM section     RADIOLOGY:  Non-plain film images such as CT, Ultrasound and MRI are read by the radiologist. Plain radiographic images are visualized and preliminarily interpreted by the ED Provider with the findings documented in the MDM section. Interpretation per the Radiologist below, if available at the time of this note:     CT ABD PELV W CONT    Result Date: 3/18/2023  EXAM: CT ABD PELV W CONT INDICATION: diffuse pain, n/v, leukocytosis COMPARISON: CT abdomen August 3, 2017 CONTRAST: 100 mL of Iomeron 350. ORAL CONTRAST: None TECHNIQUE: Following the uneventful intravenous administration of contrast, thin axial images were obtained through the abdomen and pelvis. Coronal and sagittal reconstructions were generated. CT dose reduction was achieved through use of a standardized protocol tailored for this examination and automatic exposure control for dose modulation. FINDINGS: LOWER THORAX: No significant abnormality in the incidentally imaged lower chest. LIVER: Slightly irregular hepatic surface contour. BILIARY TREE: Gallbladder is within normal limits. CBD is not dilated. SPLEEN: within normal limits. PANCREAS: No mass or ductal dilatation. ADRENALS: 2.0 cm right adrenal mass, measuring 18 Hounsfield units, this does not meet diagnostic criteria for lipid rich adrenal adenoma. There is also nodular thickening of the left adrenal gland. KIDNEYS: Scattered areas of cortical scarring. No hydronephrosis or calculus. STOMACH: Unremarkable. SMALL BOWEL: No dilatation or wall thickening. COLON: Mild diverticulosis without evidence of diverticulitis APPENDIX: Unremarkable. PERITONEUM: No ascites or pneumoperitoneum. RETROPERITONEUM: No lymphadenopathy or aortic aneurysm. REPRODUCTIVE ORGANS: Mild prostatomegaly. URINARY BLADDER: No mass or calculus. BONES: No destructive bone lesion. ABDOMINAL WALL: No mass or hernia. ADDITIONAL COMMENTS: N/A     1. No acute findings. 2. Hepatic pathology suspicious for cirrhosis.  3. 2 cm right adrenal mass, and nodular thickening of the left adrenal gland. Recommend abdominal MRI for complete evaluation. PROCEDURES   Unless otherwise noted below, none  Procedures     CRITICAL CARE TIME   0    EMERGENCY DEPARTMENT COURSE and DIFFERENTIAL DIAGNOSIS/MDM   Vitals:    Vitals:    03/18/23 1549 03/18/23 1733 03/18/23 1736 03/18/23 1942   BP: (!) 173/114 (!) 182/123  (!) 172/98   Pulse: 66  72 68   Resp: 17  29 16   Temp: 97.3 °F (36.3 °C)   98.2 °F (36.8 °C)   SpO2: 97%  96% 97%   Weight: 111.1 kg (245 lb)      Height: 5' 4\" (1.626 m)           Patient was given the following medications:  Medications   ondansetron (ZOFRAN ODT) tablet 4 mg (4 mg Oral Given 3/18/23 1533)   famotidine (PF) (PEPCID) 20 mg in 0.9% sodium chloride 10 mL injection (20 mg IntraVENous Given 3/18/23 1904)   ondansetron (ZOFRAN) injection 4 mg (4 mg IntraVENous Given 3/18/23 1904)   iomeprol (IOMERON 350) 350 mg iodine /mL (71.44 %) contrast injection 200 mL (100 mL IntraVENous Given 3/18/23 1838)       Medical Decision Making  Amount and/or Complexity of Data Reviewed  Labs: ordered. Decision-making details documented in ED Course. Radiology: ordered. ECG/medicine tests: ordered and independent interpretation performed. Decision-making details documented in ED Course. Risk  OTC drugs. Prescription drug management. 18-year-old male presenting to the emergency department with diffuse epigastric abdominal pain with associated nausea and nonbloody nonbilious emesis prior to arrival.  He is afebrile and vital signs stable. Noted to be hypertensive but he states that he has history of hypertension and did not take his medications this morning. He has minimal reproducible TTP on exam.  Differential diagnosis includes gastritis, acute gastroenteritis, food poisoning, cholecystitis, biliary colic, acute appendicitis, colitis, PUD. He is noted to have mild leukocytosis 13,000.   Given age, comorbidities, reproducible pain and leukocytosis I will evaluate with CT imaging of abdomen and pelvis to rule out acute intra-abdominal process. Additionally given his age and comorbidities I will evaluate with high-sensitivity troponin and EKG to rule out ACS. I will treat with IV Pepcid, IV Zofran, and reassess. ED Course as of 03/18/23 2308   Sat Mar 18, 2023   1816 EKG per my interpretation normal sinus rhythm, rate 66 bpm, right bundle branch block present, normal axis, no acute ischemic changes. [AK]   1910 CT negative for acute process. Patient made aware of incidental findings. Strongly encourage close follow-up with PCP regarding this. Patient feels better, no longer having any pain, able to eat and drink. No sign of ACS on troponin or EKG. Encourage close follow-up with PCP and given strict return precautions. All questions answered and he agrees with plan as above. [AK]      ED Course User Index  [AK] Larissa Hung MD         Cardiac Monitoring: The cardiac monitor revealed the following rhythm as interpreted by me: Normal Sinus Rhythm, rate 70 bpm  The cardiac monitor was ordered secondary to the patient's reported complaint of epigastric abdominal pain and to monitor the patient for dysrhythmia. Philip Deal MD      FINAL IMPRESSION     1. Abdominal pain, epigastric    2. Nausea and vomiting, unspecified vomiting type    3. Accelerated hypertension          DISPOSITION/PLAN     Discharge Note:  The patient has been re-evaluated and is ready for discharge. Reviewed available results with patient. Counseled patient on diagnosis and care plan. Patient has expressed understanding, and all questions have been answered. Patient agrees with plan and agrees to follow up as recommended, or to return to the ED if their symptoms worsen. Discharge instructions have been provided and explained to the patient, along with reasons to return to the ED. CLINICAL IMPRESSION    1. Abdominal pain, epigastric    2. Nausea and vomiting, unspecified vomiting type    3.  Accelerated hypertension         DISPOSITION  Discharge     PATIENT REFERRED TO:  Follow-up Information       Follow up With Specialties Details Why Contact Info    Esteban Lorenzo MD Family Medicine Schedule an appointment as soon as possible for a visit   [address] [address]  [phone]      [facility] EMERGENCY DEPT Emergency Medicine Go to  As needed, If symptoms worsen [address] [place] [address]  [phone]              DISCHARGE MEDICATIONS:  Discharge Medication List as of 3/18/2023  7:16 PM        START taking these medications    Details   ondansetron (ZOFRAN ODT) 4 mg disintegrating tablet Take 1 Tablet by mouth every eight (8) hours as needed for Nausea or Vomiting., Normal, Disp-20 Tablet, R-0           CONTINUE these medications which have NOT CHANGED    Details   glimepiride (AMARYL) 4 mg tablet Take 1 Tablet by mouth daily. , Normal, Disp-30 Tablet, R-5      bisoprolol-hydroCHLOROthiazide (ZIAC) 10-6.25 mg per tablet Take 1 Tablet by mouth daily. , Normal, Disp-30 Tablet, R-0      valsartan (DIOVAN) 160 mg tablet Take 1 Tablet by mouth daily. , Normal, Disp-30 Tablet, R-5      allopurinoL (ZYLOPRIM) 100 mg tablet TAKE 1 TABLET BY MOUTH EVERY DAY, Normal, Disp-90 Tablet, R-3      spironolactone (ALDACTONE) 50 mg tablet TAKE 1 TABLET BY MOUTH EVERY DAY, Normal, Disp-90 Tablet, R-1      !! Accu-Chek Xochilt Plus test strp strip USE TO TEST BLOOD DAILY (BEFORE BREAKFAST). , Normal, Disp-100 Strip, R-1      dilTIAZem ER (CARDIZEM CD) 300 mg capsule TAKE 1 CAPSULE BY MOUTH EVERY DAY, Normal, Disp-90 Capsule, R-2      Accu-Chek Xochilt Plus Meter misc Test blood sugar daily before breakfast., Normal, Disp-1 Each, R-0, АННА      Blood-Glucose Meter monitoring kit As directed,daily,fasting, Normal, Disp-1 Kit, R-0      !! glucose blood VI test strips (BLOOD GLUCOSE TEST) strip by Does Not Apply route Daily (before breakfast). , Normal, Disp-100 Strip, R-2       !! - Potential duplicate medications found. Please discuss with provider. DISCONTINUED MEDICATIONS:  Discharge Medication List as of 3/18/2023  7:16 PM          I am the Primary Clinician of Record. Tasia Christine MD (electronically signed)    (Please note that parts of this dictation were completed with voice recognition software. Quite often unanticipated grammatical, syntax, homophones, and other interpretive errors are inadvertently transcribed by the computer software. Please disregards these errors.  Please excuse any errors that have escaped final proofreading.)

## 2023-03-18 NOTE — DISCHARGE INSTRUCTIONS
You were evaluated in the emergency department for nausea, vomiting, and abdominal pain. Your examination was reassuring as was your work-up including blood work, EKG, and CT scan. There were some incidental findings on your CT scan including concern for liver cirrhosis as well as a mass of the left adrenal, please follow-up with your PCP regarding this. It will be important for you to follow-up with your primary care physician in 2-3 days. If you develop worsening symptoms such as chest pain, shortness of breath, worsening abdominal pain, or inability eat or drink, please return to the emergency department immediately.

## 2023-03-19 LAB
ATRIAL RATE: 66 BPM
CALCULATED P AXIS, ECG09: 50 DEGREES
CALCULATED R AXIS, ECG10: 62 DEGREES
CALCULATED T AXIS, ECG11: 90 DEGREES
DIAGNOSIS, 93000: NORMAL
P-R INTERVAL, ECG05: 134 MS
Q-T INTERVAL, ECG07: 482 MS
QRS DURATION, ECG06: 146 MS
QTC CALCULATION (BEZET), ECG08: 505 MS
VENTRICULAR RATE, ECG03: 66 BPM

## 2023-03-22 ENCOUNTER — PATIENT OUTREACH (OUTPATIENT)
Dept: CASE MANAGEMENT | Age: 58
End: 2023-03-22

## 2023-03-22 NOTE — PROGRESS NOTES
Ambulatory Care Management Note    Date/Time:  3/22/2023 6:16 PM    Patient Current Location: [place]     This patient was received as a referral from Daily assignment. Ambulatory Care Manager outreached to patient today to offer care management services. Introduction to self and role of care manager provided. Patient accepted care management services at this time. Follow up call scheduled at this time. Patient has Ambulatory Care Manager's contact number for any questions or concerns. Has no cuff at home. Will stop at [facility] to have BP checked, wants script sent to pharmacy for BP cuff, says he can't afford to buy one.

## 2023-03-27 ENCOUNTER — PATIENT OUTREACH (OUTPATIENT)
Dept: CASE MANAGEMENT | Age: 58
End: 2023-03-27

## 2023-03-28 NOTE — PROGRESS NOTES
Ambulatory Care Management Note    Date/Time:  3/28/2023 1:47 PM    Patient Current Location: [address] [place] [address]    This [address] [place] [address] ([facility]) reviewed and updated the following screenings during this call; general assessment, disease specific assessment , self management assessment, SDOH assessments, ACP assessment and note, and medication reconciliation     Patient's challenges to self-management identified:   functional physical ability, lack of knowledge about disease, level of motivation, medical condition, medication management, and utilization of services      Medication Management:  good adherence and good understanding    Advance Care Planning:   Does patient have an Advance Directive:  health care decision makers updated    [employer], Referrals, and Durable Medical Equipment:       Health Maintenance Due   Topic Date Due    Pneumococcal 0-64 years (1 - PCV) Never done    Hepatitis B Vaccine (1 of 3 - Risk 3-dose series) Never done    Shingles Vaccine (1 of 2) Never done    Diabetic Alb to Cr ratio (uACR) test  08/13/2020    Foot Exam Q1  08/13/2020    COVID-19 Vaccine (3 - Booster for Pfizer series) 06/16/2021    Flu Vaccine (1) 08/01/2022    Lipid Screen  11/29/2022     Health Maintenance Reviewed: yes    Patient was asked to consider health care goals that they would like to focus on with this ACM. ACM will follow up with patient to discuss goals and establish care plan in the next 7-14 days. PCP/Specialist follow up: No future appointments.

## 2023-04-03 NOTE — PROGRESS NOTES
April 18, 2023    Taylor Aburto                              [address]   [place] [address]    Dear Taylor,    Our office recently received a referral from Dr. Cherry related to your healthcare. We have reviewed your records and imaging and have suggestions on how we would proceed. We have made several attempts to contact you but have been unable to reach you. Your referral will be closed at this time and we will not make further attempts to contact you.     Your healthcare is important to us. If you wish to follow up on this referral please contact our office at [phone].     Clover Jackson RN Care Coordinator     Chief Complaint   Patient presents with    Shoulder Pain     Pt having L shadow and L elbow pain. [address]  OFFICE PROCEDURE PROGRESS NOTE        Chart reviewed for the following:   Nubia Parker, have reviewed the History, Physical and updated the Allergic reactions for [address] performed immediately prior to start of procedure:   Nubia Parker, have performed the following reviews on [address] prior to the start of the procedure:            * Patient was identified by name and date of birth   * Agreement on procedure being performed was verified  * Risks and Benefits explained to the patient  * Procedure site verified and marked as necessary  * Patient was positioned for comfort  * Consent was signed and verified     Time: 9:45am      Date of procedure: 5/14/2018    Procedure performed by: Judd Almanza MD    Provider assisted by: N/A    Patient assisted by: N/A    How tolerated by patient: Well    Post Procedural Pain Scale: 5/10    Comments: Pt received Left shoulder joint injection and tolerated it well; Pt pain before procedure was 6/10 and after a level 5/10.

## 2023-04-27 ENCOUNTER — PATIENT OUTREACH (OUTPATIENT)
Dept: CASE MANAGEMENT | Age: 58
End: 2023-04-27

## 2023-05-01 NOTE — PROGRESS NOTES
05/01/23    Goals Addressed                   This Visit's Progress     Attends follow up appointments on schedule   On track     04/13/23  Outreach completed  Has followed up with appointment as scheduled  Reviewed all upcoming appointments  Patient verbalized knowledge and will attend  ACM will follow up again in12-14 days. Pmk    05/01/23   Completed follow ups as was scheduled  Reviewed all upcoming appointments  No ED visits in 30 days  Patient has met goals  ACM will follow in 12-14 days. pmk       Patient verbalizes understanding of self -management goals of living with Diabetes. On track     04/13/23   Diabetic Care Plan:  Outreach completed for CCM assessment  Complete medication review,  (ie. action, side effects, missed dose, etc.) Review roles of different meds. Educated on Importance of checking blood sugars, keeping a log, and understanding BS goals  Discussed the importance to prevent, recognize, and manage high and low blood sugars  Discussed meal planning and carb counting  Discussed importance of physical activity. Encouraged to call doctor or nurse if blood sugar is high and you don't know why. ACM will follow up in 7-10 days. Contact information given. Pmk.    05/01/23  Outreach completed, patient sys he is doing better, says his BP and BS is better, but he is not at home and did not remember the numbers. Admits to having equipment at home to check numbers  Patient educated on goals of his numbers and verbalized teach back instructions  Remain compliant with diet and medications  ACM will follow up again in 12-14 days.  pmk

## 2023-05-10 ENCOUNTER — TELEPHONE (OUTPATIENT)
Age: 58
End: 2023-05-10

## 2023-05-10 NOTE — TELEPHONE ENCOUNTER
Patient called to get a release of medical records faxed to [phone]. Patient stated that he resides in [place].  Faxed request at 9:05am.

## 2023-05-15 ENCOUNTER — TELEPHONE (OUTPATIENT)
Age: 58
End: 2023-05-15

## 2023-05-15 NOTE — TELEPHONE ENCOUNTER
Patient called to have medical records faxed. Medical release form signed and faxed. Confirmation scanned in media.

## 2023-05-22 ENCOUNTER — TELEPHONE (OUTPATIENT)
Age: 58
End: 2023-05-22

## 2023-09-24 NOTE — TELEPHONE ENCOUNTER
Pt called stating that someone from the office told him he needed to complete paperwork by 12/6  prior to his COVID test. He never received it. FLORENCE Cordoba show

## 2025-05-21 NOTE — PROGRESS NOTES
HISTORY OF PRESENT ILLNESS  Samantha Harmon III is a 46 y.o. male. worsening rt knee pain and swelling. F/U HBP,having some pain in Rt achilles tendon  Leg Pain    The history is provided by the patient. This is a new problem. The current episode started more than 1 week ago. The problem occurs daily. The problem has not changed since onset. The pain is present in the right lower leg. The quality of the pain is described as aching. The pain is at a severity of 3/10. The pain is moderate. Associated symptoms include stiffness. Knee Pain    The history is provided by the patient. This is a chronic problem. The problem occurs daily. The problem has been gradually worsening. The pain is present in the right knee. The pain is at a severity of 4/10. The pain is moderate. Associated symptoms include stiffness. Hypertension    This is a chronic problem. The problem has been gradually worsening. Pertinent negatives include no chest pain, no malaise/fatigue, no peripheral edema and no dizziness. Review of Systems   Constitutional: Negative for malaise/fatigue. Cardiovascular: Negative for chest pain. Musculoskeletal: Positive for joint pain and stiffness. Neurological: Negative for dizziness. Physical Exam   Constitutional: He is oriented to person, place, and time. He appears well-developed and well-nourished. HENT:   Head: Normocephalic and atraumatic. Right Ear: External ear normal.   Nose: Nose normal.   Mouth/Throat: Oropharynx is clear and moist.   Cardiovascular: Normal rate. Abdominal: Soft. Bowel sounds are normal.   Musculoskeletal:        Right knee: He exhibits decreased range of motion, swelling and effusion. Tenderness found. Lateral joint line tenderness noted. Right lower leg: He exhibits tenderness. He exhibits no bony tenderness, no swelling, no edema and no deformity. Legs:  ttp   Neurological: He is alert and oriented to person, place, and time.    Skin: Skin is warm and Noted thank you   dry.       ASSESSMENT and PLAN  Diagnoses and all orders for this visit:    1. Primary osteoarthritis of right knee  -     METHYLPREDNISOLONE ACETATE INJECTION 40 MG  -     methylPREDNISolone acetate (DEPO-MEDROL) 40 mg/mL injection; 1 mL by IntraMUSCular route once for 1 dose. -     NC DRAIN/INJECT LARGE JOINT/BURSA    2. Disorder of right Achilles tendon      Follow-up and Dispositions    · Return in about 1 month (around 8/12/2019). Indications:   Symptom relief from Right Knee osteoarthritis    Procedure:  After consent was obtained,and procedure risks and benefits reviewed,using sterile technique the Rt Knee Joint  was prepped using Betadine. The joint was entered usinga 23 ga needle and 40 mg of Depo-Medrol and 1 ml Lidocaine were injected without difficulty ,and the needle was withdrawn. The procedure was well tolerated,with negligile discomfort postprocedure. The patient was asked to continue to rest the joint fora few days before resuming normal activities. They were advised that there may be increased pain for the next 1-2 days,and to watch for fever,redness,or increased swelling or pain. They were advised to call if such symptoms develop. Joint Injection instruction sheet was given to the patient and reviewed. The patient departed in good condition